# Patient Record
Sex: MALE | Race: WHITE | Employment: OTHER | URBAN - METROPOLITAN AREA
[De-identification: names, ages, dates, MRNs, and addresses within clinical notes are randomized per-mention and may not be internally consistent; named-entity substitution may affect disease eponyms.]

---

## 2021-03-05 ENCOUNTER — HOSPITAL ENCOUNTER (INPATIENT)
Age: 71
LOS: 1 days | Discharge: ANOTHER HOSPITAL | DRG: 287 | End: 2021-03-05
Attending: SPECIALIST | Admitting: SPECIALIST
Payer: MEDICARE

## 2021-03-05 ENCOUNTER — APPOINTMENT (OUTPATIENT)
Dept: CT IMAGING | Age: 71
DRG: 287 | End: 2021-03-05
Attending: SPECIALIST
Payer: MEDICARE

## 2021-03-05 ENCOUNTER — APPOINTMENT (OUTPATIENT)
Dept: VASCULAR SURGERY | Age: 71
DRG: 287 | End: 2021-03-05
Attending: INTERNAL MEDICINE
Payer: MEDICARE

## 2021-03-05 ENCOUNTER — APPOINTMENT (OUTPATIENT)
Dept: GENERAL RADIOLOGY | Age: 71
DRG: 302 | End: 2021-03-05
Attending: NURSE PRACTITIONER
Payer: MEDICARE

## 2021-03-05 ENCOUNTER — HOSPITAL ENCOUNTER (INPATIENT)
Age: 71
LOS: 3 days | Discharge: HOME | DRG: 302 | End: 2021-03-08
Attending: THORACIC SURGERY (CARDIOTHORACIC VASCULAR SURGERY) | Admitting: THORACIC SURGERY (CARDIOTHORACIC VASCULAR SURGERY)
Payer: MEDICARE

## 2021-03-05 VITALS
SYSTOLIC BLOOD PRESSURE: 141 MMHG | HEART RATE: 51 BPM | BODY MASS INDEX: 25.65 KG/M2 | HEIGHT: 64 IN | OXYGEN SATURATION: 96 % | DIASTOLIC BLOOD PRESSURE: 78 MMHG | TEMPERATURE: 97.7 F | WEIGHT: 150.25 LBS | RESPIRATION RATE: 17 BRPM

## 2021-03-05 DIAGNOSIS — R07.9 CHEST PAIN, UNSPECIFIED TYPE: ICD-10-CM

## 2021-03-05 DIAGNOSIS — I63.9 ACUTE CVA (CEREBROVASCULAR ACCIDENT) (HCC): ICD-10-CM

## 2021-03-05 PROBLEM — I20.0 UNSTABLE ANGINA (HCC): Status: ACTIVE | Noted: 2021-03-05

## 2021-03-05 PROBLEM — I25.10 CAD (CORONARY ARTERY DISEASE): Status: ACTIVE | Noted: 2021-03-05

## 2021-03-05 LAB
ABO + RH BLD: NORMAL
ALBUMIN SERPL-MCNC: 3.8 G/DL (ref 3.5–5)
ALBUMIN/GLOB SERPL: 1.4 {RATIO} (ref 1.1–2.2)
ALP SERPL-CCNC: 52 U/L (ref 45–117)
ALT SERPL-CCNC: 31 U/L (ref 12–78)
AMORPH CRY URNS QL MICRO: ABNORMAL
ANION GAP SERPL CALC-SCNC: 5 MMOL/L (ref 5–15)
ANION GAP SERPL CALC-SCNC: 5 MMOL/L (ref 5–15)
APPEARANCE UR: ABNORMAL
APTT PPP: 24.8 SEC (ref 22.1–31)
ARTERIAL PATENCY WRIST A: YES
AST SERPL-CCNC: 17 U/L (ref 15–37)
ATRIAL RATE: 46 BPM
BACTERIA URNS QL MICRO: NEGATIVE /HPF
BASE DEFICIT BLD-SCNC: 1 MMOL/L
BASOPHILS # BLD: 0 K/UL (ref 0–0.1)
BASOPHILS NFR BLD: 0 % (ref 0–1)
BDY SITE: ABNORMAL
BILIRUB SERPL-MCNC: 0.7 MG/DL (ref 0.2–1)
BILIRUB UR QL: NEGATIVE
BLOOD GROUP ANTIBODIES SERPL: NORMAL
BNP SERPL-MCNC: 32 PG/ML
BUN SERPL-MCNC: 21 MG/DL (ref 6–20)
BUN SERPL-MCNC: 22 MG/DL (ref 6–20)
BUN/CREAT SERPL: 16 (ref 12–20)
BUN/CREAT SERPL: 17 (ref 12–20)
CA-I BLD-SCNC: 1.3 MMOL/L (ref 1.12–1.32)
CALCIUM SERPL-MCNC: 9 MG/DL (ref 8.5–10.1)
CALCIUM SERPL-MCNC: 9.2 MG/DL (ref 8.5–10.1)
CALCULATED P AXIS, ECG09: 58 DEGREES
CALCULATED R AXIS, ECG10: 19 DEGREES
CALCULATED T AXIS, ECG11: 43 DEGREES
CHLORIDE SERPL-SCNC: 109 MMOL/L (ref 97–108)
CHLORIDE SERPL-SCNC: 109 MMOL/L (ref 97–108)
CHOLEST SERPL-MCNC: 208 MG/DL
CO2 SERPL-SCNC: 25 MMOL/L (ref 21–32)
CO2 SERPL-SCNC: 28 MMOL/L (ref 21–32)
COLOR UR: ABNORMAL
CREAT SERPL-MCNC: 1.27 MG/DL (ref 0.7–1.3)
CREAT SERPL-MCNC: 1.28 MG/DL (ref 0.7–1.3)
DIAGNOSIS, 93000: NORMAL
DIFFERENTIAL METHOD BLD: ABNORMAL
EOSINOPHIL # BLD: 0.2 K/UL (ref 0–0.4)
EOSINOPHIL NFR BLD: 4 % (ref 0–7)
EPITH CASTS URNS QL MICRO: ABNORMAL /LPF
ERYTHROCYTE [DISTWIDTH] IN BLOOD BY AUTOMATED COUNT: 15.5 % (ref 11.5–14.5)
ERYTHROCYTE [DISTWIDTH] IN BLOOD BY AUTOMATED COUNT: 16.9 % (ref 11.5–14.5)
EST. AVERAGE GLUCOSE BLD GHB EST-MCNC: 105 MG/DL
GAS FLOW.O2 O2 DELIVERY SYS: ABNORMAL L/MIN
GLOBULIN SER CALC-MCNC: 2.7 G/DL (ref 2–4)
GLUCOSE BLD STRIP.AUTO-MCNC: 124 MG/DL (ref 65–100)
GLUCOSE SERPL-MCNC: 104 MG/DL (ref 65–100)
GLUCOSE SERPL-MCNC: 109 MG/DL (ref 65–100)
GLUCOSE UR STRIP.AUTO-MCNC: NEGATIVE MG/DL
HAV IGM SER QL: NONREACTIVE
HBA1C MFR BLD: 5.3 % (ref 4–5.6)
HBV CORE IGM SER QL: NONREACTIVE
HBV SURFACE AG SER QL: <0.1 INDEX
HBV SURFACE AG SER QL: NEGATIVE
HCO3 BLD-SCNC: 23.9 MMOL/L (ref 22–26)
HCT VFR BLD AUTO: 38 % (ref 36.6–50.3)
HCT VFR BLD AUTO: 41.3 % (ref 36.6–50.3)
HCV AB SERPL QL IA: NONREACTIVE
HCV COMMENT,HCGAC: NORMAL
HDLC SERPL-MCNC: 40 MG/DL
HDLC SERPL: 5.2 {RATIO} (ref 0–5)
HGB BLD-MCNC: 11.9 G/DL (ref 12.1–17)
HGB BLD-MCNC: 12.7 G/DL (ref 12.1–17)
HGB UR QL STRIP: NEGATIVE
IMM GRANULOCYTES # BLD AUTO: 0 K/UL (ref 0–0.04)
IMM GRANULOCYTES NFR BLD AUTO: 0 % (ref 0–0.5)
INR PPP: 1.1 (ref 0.9–1.1)
KETONES UR QL STRIP.AUTO: NEGATIVE MG/DL
LDLC SERPL CALC-MCNC: 131 MG/DL (ref 0–100)
LEUKOCYTE ESTERASE UR QL STRIP.AUTO: NEGATIVE
LIPID PROFILE,FLP: ABNORMAL
LYMPHOCYTES # BLD: 1.4 K/UL (ref 0.8–3.5)
LYMPHOCYTES NFR BLD: 27 % (ref 12–49)
MAGNESIUM SERPL-MCNC: 2.9 MG/DL (ref 1.6–2.4)
MCH RBC QN AUTO: 20.8 PG (ref 26–34)
MCH RBC QN AUTO: 21.2 PG (ref 26–34)
MCHC RBC AUTO-ENTMCNC: 30.8 G/DL (ref 30–36.5)
MCHC RBC AUTO-ENTMCNC: 31.3 G/DL (ref 30–36.5)
MCV RBC AUTO: 67.7 FL (ref 80–99)
MCV RBC AUTO: 67.7 FL (ref 80–99)
MONOCYTES # BLD: 0.5 K/UL (ref 0–1)
MONOCYTES NFR BLD: 10 % (ref 5–13)
NEUTS SEG # BLD: 3 K/UL (ref 1.8–8)
NEUTS SEG NFR BLD: 59 % (ref 32–75)
NITRITE UR QL STRIP.AUTO: NEGATIVE
NRBC # BLD: 0 K/UL (ref 0–0.01)
NRBC # BLD: 0 K/UL (ref 0–0.01)
NRBC BLD-RTO: 0 PER 100 WBC
NRBC BLD-RTO: 0 PER 100 WBC
P-R INTERVAL, ECG05: 170 MS
PCO2 BLD: 38.8 MMHG (ref 35–45)
PH BLD: 7.4 [PH] (ref 7.35–7.45)
PH UR STRIP: 7.5 [PH] (ref 5–8)
PLATELET # BLD AUTO: 221 K/UL (ref 150–400)
PLATELET # BLD AUTO: 229 K/UL (ref 150–400)
PMV BLD AUTO: 10 FL (ref 8.9–12.9)
PMV BLD AUTO: 10.7 FL (ref 8.9–12.9)
PO2 BLD: 73 MMHG (ref 80–100)
POTASSIUM SERPL-SCNC: 4.1 MMOL/L (ref 3.5–5.1)
POTASSIUM SERPL-SCNC: 4.3 MMOL/L (ref 3.5–5.1)
PROT SERPL-MCNC: 6.5 G/DL (ref 6.4–8.2)
PROT UR STRIP-MCNC: NEGATIVE MG/DL
PROTHROMBIN TIME: 11.1 SEC (ref 9–11.1)
Q-T INTERVAL, ECG07: 434 MS
QRS DURATION, ECG06: 90 MS
QTC CALCULATION (BEZET), ECG08: 379 MS
RBC # BLD AUTO: 5.61 M/UL (ref 4.1–5.7)
RBC # BLD AUTO: 6.1 M/UL (ref 4.1–5.7)
RBC #/AREA URNS HPF: ABNORMAL /HPF (ref 0–5)
RBC MORPH BLD: ABNORMAL
SAO2 % BLD: 94 % (ref 92–97)
SERVICE CMNT-IMP: ABNORMAL
SODIUM SERPL-SCNC: 139 MMOL/L (ref 136–145)
SODIUM SERPL-SCNC: 142 MMOL/L (ref 136–145)
SP GR UR REFRACTOMETRY: 1.01 (ref 1–1.03)
SP1: NORMAL
SP2: NORMAL
SP3: NORMAL
SPECIMEN EXP DATE BLD: NORMAL
SPECIMEN TYPE: ABNORMAL
THERAPEUTIC RANGE,PTTT: NORMAL SECS (ref 58–77)
TOTAL RESP. RATE, ITRR: 20
TRIGL SERPL-MCNC: 185 MG/DL (ref ?–150)
TSH SERPL DL<=0.05 MIU/L-ACNC: 1.76 UIU/ML (ref 0.36–3.74)
UA: UC IF INDICATED,UAUC: ABNORMAL
UROBILINOGEN UR QL STRIP.AUTO: 0.2 EU/DL (ref 0.2–1)
VENTRICULAR RATE, ECG03: 46 BPM
VLDLC SERPL CALC-MCNC: 37 MG/DL
WBC # BLD AUTO: 4.3 K/UL (ref 4.1–11.1)
WBC # BLD AUTO: 5.1 K/UL (ref 4.1–11.1)
WBC URNS QL MICRO: ABNORMAL /HPF (ref 0–4)

## 2021-03-05 PROCEDURE — 74011000250 HC RX REV CODE- 250: Performed by: SPECIALIST

## 2021-03-05 PROCEDURE — C1894 INTRO/SHEATH, NON-LASER: HCPCS | Performed by: SPECIALIST

## 2021-03-05 PROCEDURE — 83735 ASSAY OF MAGNESIUM: CPT

## 2021-03-05 PROCEDURE — 36415 COLL VENOUS BLD VENIPUNCTURE: CPT

## 2021-03-05 PROCEDURE — 86901 BLOOD TYPING SEROLOGIC RH(D): CPT

## 2021-03-05 PROCEDURE — 82962 GLUCOSE BLOOD TEST: CPT

## 2021-03-05 PROCEDURE — 80074 ACUTE HEPATITIS PANEL: CPT

## 2021-03-05 PROCEDURE — 82803 BLOOD GASES ANY COMBINATION: CPT

## 2021-03-05 PROCEDURE — 80061 LIPID PANEL: CPT

## 2021-03-05 PROCEDURE — 65620000000 HC RM CCU GENERAL

## 2021-03-05 PROCEDURE — 83036 HEMOGLOBIN GLYCOSYLATED A1C: CPT

## 2021-03-05 PROCEDURE — 4A023N7 MEASUREMENT OF CARDIAC SAMPLING AND PRESSURE, LEFT HEART, PERCUTANEOUS APPROACH: ICD-10-PCS | Performed by: SPECIALIST

## 2021-03-05 PROCEDURE — 74011250636 HC RX REV CODE- 250/636: Performed by: NURSE PRACTITIONER

## 2021-03-05 PROCEDURE — 36600 WITHDRAWAL OF ARTERIAL BLOOD: CPT

## 2021-03-05 PROCEDURE — 70450 CT HEAD/BRAIN W/O DYE: CPT

## 2021-03-05 PROCEDURE — 74011250637 HC RX REV CODE- 250/637: Performed by: NURSE PRACTITIONER

## 2021-03-05 PROCEDURE — 77030029065 HC DRSG HEMO QCLOT ZMED -B

## 2021-03-05 PROCEDURE — 85730 THROMBOPLASTIN TIME PARTIAL: CPT

## 2021-03-05 PROCEDURE — 65270000029 HC RM PRIVATE

## 2021-03-05 PROCEDURE — 93458 L HRT ARTERY/VENTRICLE ANGIO: CPT | Performed by: SPECIALIST

## 2021-03-05 PROCEDURE — 99153 MOD SED SAME PHYS/QHP EA: CPT | Performed by: SPECIALIST

## 2021-03-05 PROCEDURE — 93005 ELECTROCARDIOGRAM TRACING: CPT

## 2021-03-05 PROCEDURE — 93880 EXTRACRANIAL BILAT STUDY: CPT

## 2021-03-05 PROCEDURE — 99218 HC RM OBSERVATION: CPT

## 2021-03-05 PROCEDURE — 71045 X-RAY EXAM CHEST 1 VIEW: CPT

## 2021-03-05 PROCEDURE — B2111ZZ FLUOROSCOPY OF MULTIPLE CORONARY ARTERIES USING LOW OSMOLAR CONTRAST: ICD-10-PCS | Performed by: SPECIALIST

## 2021-03-05 PROCEDURE — 99152 MOD SED SAME PHYS/QHP 5/>YRS: CPT | Performed by: SPECIALIST

## 2021-03-05 PROCEDURE — 77030004532 HC CATH ANGI DX IMP BSC -A: Performed by: SPECIALIST

## 2021-03-05 PROCEDURE — 85025 COMPLETE CBC W/AUTO DIFF WBC: CPT

## 2021-03-05 PROCEDURE — 80053 COMPREHEN METABOLIC PANEL: CPT

## 2021-03-05 PROCEDURE — 80048 BASIC METABOLIC PNL TOTAL CA: CPT

## 2021-03-05 PROCEDURE — 85610 PROTHROMBIN TIME: CPT

## 2021-03-05 PROCEDURE — 77030003390 HC NDL ANGI MRTM -A: Performed by: SPECIALIST

## 2021-03-05 PROCEDURE — 85027 COMPLETE CBC AUTOMATED: CPT

## 2021-03-05 PROCEDURE — 74011250636 HC RX REV CODE- 250/636: Performed by: SPECIALIST

## 2021-03-05 PROCEDURE — 83880 ASSAY OF NATRIURETIC PEPTIDE: CPT

## 2021-03-05 PROCEDURE — 84443 ASSAY THYROID STIM HORMONE: CPT

## 2021-03-05 PROCEDURE — 74011000636 HC RX REV CODE- 636: Performed by: SPECIALIST

## 2021-03-05 PROCEDURE — 81001 URINALYSIS AUTO W/SCOPE: CPT

## 2021-03-05 RX ORDER — AMIODARONE HYDROCHLORIDE 200 MG/1
400 TABLET ORAL EVERY 12 HOURS
Status: DISCONTINUED | OUTPATIENT
Start: 2021-03-05 | End: 2021-03-05

## 2021-03-05 RX ORDER — AMIODARONE HYDROCHLORIDE 200 MG/1
400 TABLET ORAL EVERY 12 HOURS
Status: CANCELLED | OUTPATIENT
Start: 2021-03-05

## 2021-03-05 RX ORDER — ASPIRIN 81 MG/1
81 TABLET ORAL DAILY
COMMUNITY

## 2021-03-05 RX ORDER — ENOXAPARIN SODIUM 100 MG/ML
1 INJECTION SUBCUTANEOUS EVERY 12 HOURS
Status: COMPLETED | OUTPATIENT
Start: 2021-03-05 | End: 2021-03-08

## 2021-03-05 RX ORDER — SODIUM CHLORIDE 0.9 % (FLUSH) 0.9 %
5-40 SYRINGE (ML) INJECTION EVERY 8 HOURS
Status: DISCONTINUED | OUTPATIENT
Start: 2021-03-05 | End: 2021-03-08 | Stop reason: HOSPADM

## 2021-03-05 RX ORDER — ADHESIVE BANDAGE
30 BANDAGE TOPICAL DAILY PRN
Status: DISCONTINUED | OUTPATIENT
Start: 2021-03-05 | End: 2021-03-05 | Stop reason: HOSPADM

## 2021-03-05 RX ORDER — SODIUM CHLORIDE 0.9 % (FLUSH) 0.9 %
5-40 SYRINGE (ML) INJECTION EVERY 8 HOURS
Status: CANCELLED | OUTPATIENT
Start: 2021-03-05

## 2021-03-05 RX ORDER — DIPHENHYDRAMINE HYDROCHLORIDE 50 MG/ML
25 INJECTION, SOLUTION INTRAMUSCULAR; INTRAVENOUS
Status: DISCONTINUED | OUTPATIENT
Start: 2021-03-05 | End: 2021-03-05 | Stop reason: HOSPADM

## 2021-03-05 RX ORDER — ATORVASTATIN CALCIUM 10 MG/1
10 TABLET, FILM COATED ORAL DAILY
Status: CANCELLED | OUTPATIENT
Start: 2021-03-06

## 2021-03-05 RX ORDER — ATORVASTATIN CALCIUM 10 MG/1
10 TABLET, FILM COATED ORAL DAILY
COMMUNITY
End: 2021-03-08

## 2021-03-05 RX ORDER — HYDROMORPHONE HYDROCHLORIDE 1 MG/ML
1 INJECTION, SOLUTION INTRAMUSCULAR; INTRAVENOUS; SUBCUTANEOUS
Status: DISCONTINUED | OUTPATIENT
Start: 2021-03-05 | End: 2021-03-05 | Stop reason: HOSPADM

## 2021-03-05 RX ORDER — SODIUM CHLORIDE 9 MG/ML
125 INJECTION, SOLUTION INTRAVENOUS CONTINUOUS
Status: DISPENSED | OUTPATIENT
Start: 2021-03-05 | End: 2021-03-05

## 2021-03-05 RX ORDER — HYDROCORTISONE SODIUM SUCCINATE 100 MG/2ML
100 INJECTION, POWDER, FOR SOLUTION INTRAMUSCULAR; INTRAVENOUS
Status: DISCONTINUED | OUTPATIENT
Start: 2021-03-05 | End: 2021-03-05 | Stop reason: HOSPADM

## 2021-03-05 RX ORDER — ASPIRIN 81 MG/1
81 TABLET ORAL DAILY
Status: DISCONTINUED | OUTPATIENT
Start: 2021-03-05 | End: 2021-03-05 | Stop reason: HOSPADM

## 2021-03-05 RX ORDER — SODIUM CHLORIDE 0.9 % (FLUSH) 0.9 %
5-40 SYRINGE (ML) INJECTION AS NEEDED
Status: DISCONTINUED | OUTPATIENT
Start: 2021-03-05 | End: 2021-03-05 | Stop reason: HOSPADM

## 2021-03-05 RX ORDER — METOPROLOL TARTRATE 25 MG/1
25 TABLET, FILM COATED ORAL 2 TIMES DAILY
Status: ON HOLD | COMMUNITY
End: 2021-04-30 | Stop reason: SDUPTHER

## 2021-03-05 RX ORDER — METOPROLOL TARTRATE 25 MG/1
25 TABLET, FILM COATED ORAL 2 TIMES DAILY
Status: DISCONTINUED | OUTPATIENT
Start: 2021-03-05 | End: 2021-03-05 | Stop reason: HOSPADM

## 2021-03-05 RX ORDER — ATORVASTATIN CALCIUM 10 MG/1
10 TABLET, FILM COATED ORAL DAILY
Status: DISCONTINUED | OUTPATIENT
Start: 2021-03-05 | End: 2021-03-05 | Stop reason: HOSPADM

## 2021-03-05 RX ORDER — SODIUM CHLORIDE 0.9 % (FLUSH) 0.9 %
5-40 SYRINGE (ML) INJECTION AS NEEDED
Status: DISCONTINUED | OUTPATIENT
Start: 2021-03-05 | End: 2021-03-08 | Stop reason: HOSPADM

## 2021-03-05 RX ORDER — TADALAFIL 10 MG/1
10 TABLET ORAL
COMMUNITY
End: 2021-03-08

## 2021-03-05 RX ORDER — SODIUM CHLORIDE 0.9 % (FLUSH) 0.9 %
5-40 SYRINGE (ML) INJECTION AS NEEDED
Status: CANCELLED | OUTPATIENT
Start: 2021-03-05

## 2021-03-05 RX ORDER — MIDAZOLAM HYDROCHLORIDE 1 MG/ML
INJECTION, SOLUTION INTRAMUSCULAR; INTRAVENOUS AS NEEDED
Status: DISCONTINUED | OUTPATIENT
Start: 2021-03-05 | End: 2021-03-05 | Stop reason: HOSPADM

## 2021-03-05 RX ORDER — ZOLPIDEM TARTRATE 5 MG/1
5 TABLET ORAL
Status: DISCONTINUED | OUTPATIENT
Start: 2021-03-05 | End: 2021-03-05 | Stop reason: HOSPADM

## 2021-03-05 RX ORDER — LIDOCAINE HYDROCHLORIDE 10 MG/ML
INJECTION INFILTRATION; PERINEURAL AS NEEDED
Status: DISCONTINUED | OUTPATIENT
Start: 2021-03-05 | End: 2021-03-05 | Stop reason: HOSPADM

## 2021-03-05 RX ORDER — METOPROLOL TARTRATE 25 MG/1
25 TABLET, FILM COATED ORAL 2 TIMES DAILY
Status: CANCELLED | OUTPATIENT
Start: 2021-03-05

## 2021-03-05 RX ORDER — ACETAMINOPHEN 325 MG/1
650 TABLET ORAL
Status: CANCELLED | OUTPATIENT
Start: 2021-03-05

## 2021-03-05 RX ORDER — HEPARIN SODIUM 200 [USP'U]/100ML
INJECTION, SOLUTION INTRAVENOUS
Status: COMPLETED | OUTPATIENT
Start: 2021-03-05 | End: 2021-03-05

## 2021-03-05 RX ORDER — ACETAMINOPHEN 325 MG/1
650 TABLET ORAL
Status: DISCONTINUED | OUTPATIENT
Start: 2021-03-05 | End: 2021-03-08 | Stop reason: HOSPADM

## 2021-03-05 RX ORDER — ONDANSETRON 2 MG/ML
4 INJECTION INTRAMUSCULAR; INTRAVENOUS
Status: CANCELLED | OUTPATIENT
Start: 2021-03-05

## 2021-03-05 RX ORDER — METOPROLOL TARTRATE 25 MG/1
25 TABLET, FILM COATED ORAL 2 TIMES DAILY
Status: DISCONTINUED | OUTPATIENT
Start: 2021-03-05 | End: 2021-03-08 | Stop reason: HOSPADM

## 2021-03-05 RX ORDER — ONDANSETRON 2 MG/ML
4 INJECTION INTRAMUSCULAR; INTRAVENOUS
Status: DISCONTINUED | OUTPATIENT
Start: 2021-03-05 | End: 2021-03-08 | Stop reason: HOSPADM

## 2021-03-05 RX ORDER — ASPIRIN 81 MG/1
81 TABLET ORAL DAILY
Status: CANCELLED | OUTPATIENT
Start: 2021-03-06

## 2021-03-05 RX ORDER — ASPIRIN 81 MG/1
81 TABLET ORAL DAILY
Status: DISCONTINUED | OUTPATIENT
Start: 2021-03-06 | End: 2021-03-08 | Stop reason: HOSPADM

## 2021-03-05 RX ORDER — SODIUM CHLORIDE 0.9 % (FLUSH) 0.9 %
5-40 SYRINGE (ML) INJECTION EVERY 8 HOURS
Status: DISCONTINUED | OUTPATIENT
Start: 2021-03-05 | End: 2021-03-05 | Stop reason: HOSPADM

## 2021-03-05 RX ORDER — ACETAMINOPHEN 325 MG/1
650 TABLET ORAL
Status: DISCONTINUED | OUTPATIENT
Start: 2021-03-05 | End: 2021-03-05 | Stop reason: HOSPADM

## 2021-03-05 RX ORDER — ONDANSETRON 2 MG/ML
4 INJECTION INTRAMUSCULAR; INTRAVENOUS
Status: DISCONTINUED | OUTPATIENT
Start: 2021-03-05 | End: 2021-03-05 | Stop reason: HOSPADM

## 2021-03-05 RX ORDER — ATORVASTATIN CALCIUM 10 MG/1
10 TABLET, FILM COATED ORAL DAILY
Status: DISCONTINUED | OUTPATIENT
Start: 2021-03-06 | End: 2021-03-07

## 2021-03-05 RX ORDER — FENTANYL CITRATE 50 UG/ML
INJECTION, SOLUTION INTRAMUSCULAR; INTRAVENOUS AS NEEDED
Status: DISCONTINUED | OUTPATIENT
Start: 2021-03-05 | End: 2021-03-05 | Stop reason: HOSPADM

## 2021-03-05 RX ORDER — SODIUM CHLORIDE 9 MG/ML
75 INJECTION, SOLUTION INTRAVENOUS CONTINUOUS
Status: DISCONTINUED | OUTPATIENT
Start: 2021-03-05 | End: 2021-03-05

## 2021-03-05 RX ORDER — TADALAFIL 5 MG/1
5 TABLET ORAL
COMMUNITY
End: 2021-03-08

## 2021-03-05 RX ORDER — HYDRALAZINE HYDROCHLORIDE 20 MG/ML
10 INJECTION INTRAMUSCULAR; INTRAVENOUS
Status: DISCONTINUED | OUTPATIENT
Start: 2021-03-05 | End: 2021-03-08 | Stop reason: HOSPADM

## 2021-03-05 RX ADMIN — NITROGLYCERIN 1 INCH: 20 OINTMENT TOPICAL at 17:47

## 2021-03-05 RX ADMIN — ENOXAPARIN SODIUM 70 MG: 80 INJECTION SUBCUTANEOUS at 17:47

## 2021-03-05 RX ADMIN — SODIUM CHLORIDE 75 ML/HR: 9 INJECTION, SOLUTION INTRAVENOUS at 07:35

## 2021-03-05 RX ADMIN — METOPROLOL TARTRATE 25 MG: 25 TABLET, FILM COATED ORAL at 17:47

## 2021-03-05 RX ADMIN — Medication 10 ML: at 21:33

## 2021-03-05 NOTE — Clinical Note
Sheath #1: sheath exchanged for [facility] XCUT DESTINATION [id number] -- . Hemostasis achieved.

## 2021-03-05 NOTE — PROGRESS NOTES
9:00 AM    Blood aspirated from sheath then arterial sheath pulled 6 Fr R Groin. Telly Us applied. Manual pressure held by Ed RN.     9:15 AM    Hemostasis achieved at 0915. Dressing applied. Pt voices understanding of post procedure bedrest instructions. 11:12 AM    While doing groin and pulse check, patient states he is having visual disturbances. Describes as gray spots with occasional flashing lights on the lateral side or both eyes. 11:14 AM     Code stroke activated     11:15    VS signs taken /63, HR 57 Sinus jules. RR 16, Ox 100% on RA.   NO complaints of pain. 11:17    ICU nurse [facility] RN, Nursing supervisor Judy Givens and  at bedside     11:20  Dr Marc Esquivel and Dr Ricardo Desai at bedside to assess patient     11:23    Patient transported to [facility] with this RN and ICU RN [facility]. 11:25     CT Scan completed     11:30    Neuro Tele Health called     11:43    Neuro Tele health MD Dr Nito Moody on telehelath call assessing patient     11:47    Tele health call complete with no further orders. 11:52     Patient back to [facility] in Cath Recovery. Wife at bedside. Dr Ricardo Desai at bedside updated. 1025     Patient picked up by [facility] for transport to [facility].

## 2021-03-05 NOTE — CONSULTS
[address]  [phone]    Hospitalist Consult Note      NAME:  Luzma Dueñas   :   1950   MRN:  [id number]     Requesting Physician Luis Miguel Hanan MD   Reason for Consult:  Visual disturbance     PCP:  DMITRY uG     Date/Time of service  3/5/2021 12:07 PM       Assessment and Plan:      CAD (coronary artery disease) / Unstable angina - Admitted by cardiology and taken straight to cath. Significant 3V disease found without PCI, helen is to follow up for CABG. CArdiology started metoprolol atorvastatin and ASA    Visual disturbance - Just after cath, patient reported visual disturbance. He states that he sees a dark \"snake\" across his visual fields both left and right, and that he sees it in both eyes. Not painful. No other neurological symptoms. Code stoke called. I spoke with teleneurologist. I texted to Dr Isamar Chong or neurology. CT head unremarkable. We will start TIA workup. Neuro consult and neurochecks. MRI/MRA brain and neck. Check Lipids, TSH and A1c. Start lovenox. Check drug screen. Fall precautions and PT/OT eval.          Subjective:     CHIEF COMPLAINT: chest pain     HISTORY OF PRESENT ILLNESS:     Mr. Kelsi Velazquez is a 69 y.o.  male who is admitted to the cardiology Service with CP. We are asked to evaluate for visual disturbance. Just after cath, patient reported visual disturbance. He states that he sees a dark \"snake\" across his visula fields both left and right, and that he sees it in both eyes. Not painful. No other neurological symptoms. No past medical history on file. No past surgical history on file. Social History     Tobacco Use    Smoking status: Not on file   Substance Use Topics    Alcohol use: Not on file        No family history on file. No Known Allergies     Prior to Admission medications    Medication Sig Start Date End Date Taking?  Authorizing Provider atorvastatin (LIPITOR) 10 mg tablet Take 10 mg by mouth daily. Yes Provider, Historical   aspirin delayed-release 81 mg tablet Take 81 mg by mouth daily. Yes Provider, Historical   metoprolol tartrate (LOPRESSOR) 25 mg tablet Take 25 mg by mouth two (2) times a day. Yes Provider, Historical   tadalafiL (Cialis) 5 mg tablet Take 5 mg by mouth. Yes Provider, Historical   tadalafiL (Cialis) 10 mg tablet Take 10 mg by mouth.    Yes Provider, Historical         Current Facility-Administered Medications:     0.9% sodium chloride infusion, 125 mL/hr, IntraVENous, CONTINUOUS, Kalyan Gallo MD    sodium chloride (NS) flush 5-40 mL, 5-40 mL, IntraVENous, Q8H, Kalyan Gallo MD    sodium chloride (NS) flush 5-40 mL, 5-40 mL, IntraVENous, PRN, Kalyan Gallo MD    diphenhydrAMINE (BENADRYL) injection 25 mg, 25 mg, IntraVENous, ONCE PRN, Kalyan Gallo MD    hydrocortisone Sod Succ (PF) (SOLU-CORTEF) injection 100 mg, 100 mg, IntraVENous, ONCE PRN, Kalyan Gallo MD    aspirin delayed-release tablet 81 mg, 81 mg, Oral, DAILY, Kalyan Gallo MD    atorvastatin (LIPITOR) tablet 10 mg, 10 mg, Oral, DAILY, Kalyan Gallo MD    metoprolol tartrate (LOPRESSOR) tablet 25 mg, 25 mg, Oral, BID, Kalyan Gallo MD    sodium chloride (NS) flush 5-40 mL, 5-40 mL, IntraVENous, Q8H, Kalyan Gallo MD    sodium chloride (NS) flush 5-40 mL, 5-40 mL, IntraVENous, PRN, Kalyan Gallo MD    acetaminophen (TYLENOL) tablet 650 mg, 650 mg, Oral, Q4H PRN, Kalyan Gallo MD    HYDROmorphone (DILAUDID) syringe 1 mg, 1 mg, IntraVENous, Q4H PRN, Kalyan Gallo MD    zolpidem (AMBIEN) tablet 5 mg, 5 mg, Oral, QHS PRN, Kalyan Gallo MD    ondansetron [facility]) injection 4 mg, 4 mg, IntraVENous, Q4H PRN, Kalyan Gallo MD    magnesium hydroxide (MILK OF MAGNESIA) 400 mg/5 mL oral suspension 30 mL, 30 mL, Oral, DAILY PRN, Kalyan Gallo MD    Review of Systems:  (bold if positive, if negative)    Gen:  Eyes:  Visual changes, ENT:  CVS: Pulm:  GI:  :  MS:  Skin:  Psych:  Endo:  Hem:  Renal:  Neuro:            Objective:      VITALS:    Vital signs reviewed; most recent are:    Visit Vitals  BP (!) 144/63   Pulse (!) 50   Temp 97.7 °F (36.5 °C)   Resp 14   Ht 5' 4\" (1.626 m)   Wt 68.2 kg (150 lb 4 oz)   SpO2 98%   BMI 25.79 kg/m²     SpO2 Readings from Last 6 Encounters:   03/05/21 98%    O2 Flow Rate (L/min): 2 l/min   No intake or output data in the 24 hours ending 03/05/21 1207   All Micro Results     Procedure Component Value Units Date/Time    CULTURE, URINE [[id number]]     Order Status: Sent Specimen: Urine from Clean catch             Exam:     Physical Exam:    Gen:  Well-developed, well-nourished, in no acute distress  HEENT:  Pink conjunctivae, PERRL, hearing intact to voice, moist mucous membranes  Neck:  Supple, without masses, thyroid non-tender  Resp:  No accessory muscle use, clear breath sounds without wheezes rales or rhonchi  Card:  No murmurs, bradycardic S1, S2 without thrills, bruits or peripheral edema  Abd:  Soft, non-tender, non-distended, normoactive bowel sounds are present, no mass  Lymph:  No cervical or inguinal adenopathy  Musc:  No cyanosis or clubbing  Skin:  No rashes or ulcers, skin turgor is good  Neuro:  Cranial nerves are grossly intact, no focal motor weakness, follows commands appropriately  Psych:  Good insight, oriented to person, place and time, alert       Labs:    Recent Labs     03/05/21  0715   WBC 4.3   HGB 12.7   HCT 41.3        Recent Labs     03/05/21  0715      K 4.3   *   CO2 25   *   BUN 22*   CREA 1.27   CA 9.0   MG 2.9*     Lab Results   Component Value Date/Time    Glucose (POC) 124 (H) 03/05/2021 11:17 AM     No results for input(s): PH, PCO2, PO2, HCO3, FIO2 in the last 72 hours. No results for input(s): INR, INREXT in the last 72 hours.     I have reviewed previous records,    Telemetry reviewed:   normal sinus rhythm    Risk of deterioration: high      Total time spent with patient: 35 minutes I personally reviewed chart, notes, data and current medications in the medical record. I have personally examined and treated the patient at bedside during this period.                  Care Plan discussed with: Patient, Nursing Staff, Consultant/Specialist and >50% of time spent in counseling and coordination of care    Discussed:  Care Plan       ___________________________________________________    Attending Physician: Tavo Morris MD

## 2021-03-05 NOTE — PROGRESS NOTES
TRANSFER - IN REPORT:    Verbal report received from Hattie Roberto RN (name) on Acquanetta Mater  being received from  Cath lab(unit) for routine progression of care      Report consisted of patients Situation, Background, Assessment and   Recommendations(SBAR). Information from the following report(s) SBAR was reviewed with the receiving nurse. Opportunity for questions and clarification was provided. Assessment completed upon patients arrival to unit and care assumed.

## 2021-03-05 NOTE — PROCEDURES
Cath:  Obstructive 3VD:     LAD p80, m50; D1 ost95     LCx m90 (after OM2); OM1 ost70, OM2 40     RCA m50, d80  Mild LV systolic dysfunction     EF 50%     Mild apical HK.  No AVG/MR    EXTREMELY tortuous right femoral artery.  RFA manual    F/U with Dr. Dave 3/11/21 @ 1:40pm to discuss CABG.

## 2021-03-05 NOTE — H&P
CSS   History and Physical    Subjective:      Vel Parrish is a 69 y.o. male who was transferred from [facility] today for CAD by Dr. Misha Arroyo. The patient has a PMH significant for newly diagnosed HTN, HLD, previous smoker (18 packyear-quit 2002), kidney stones, chronic lower back pain. He reports a 2-3 week history of chest tightness with activity. He is active and noticed the pain when riding his bicycle, playing pickleball, and walking [facility] the hill at [facility]. He describes this as a tightness located substernal. Pain improves with rest and is variable in duration. His pain is associated with shortness of breath. He denies associated nausea, vomiting, syncope, diaphoresis, syncope, or radiation of pain. He is retired from [employer]. He lives in a home in [place] with his wife who is able to help him following surgery. He is a previous smoker and quit in 2002. Cardiac Testing    Cardiac catheterization:03/05/21   Conclusion       Cath:  Obstructive 3VD:     LAD p80, m50; D1 ost95     LCx m90 (after OM2); OM1 ost70, OM2 40     RCA m50, V41  Mild LV systolic dysfunction     EF 50%     Mild apical HK. No AVG/MR     EXTREMELY tortuous right femoral artery. RFA manual     F/U with Dr. Sandor Bazan 3/11/21 @ 1:40pm to discuss CABG. Coronary Findings    Diagnostic  Dominance: Right  Left Main   The vessel was visualized by angiography. The vessel is angiographically normal.   Left Anterior Descending   Prox LAD lesion, 80% stenosed. Mid LAD lesion, 50% stenosed. First Diagonal Branch   1st Diag lesion, 95% stenosed. Left Circumflex   Mid Cx lesion, 90% stenosed. First Obtuse Marginal Branch   1st Mrg lesion, 70% stenosed. Second Obtuse Marginal Branch   2nd Mrg lesion, 40% stenosed. Third Obtuse Marginal Branch   The vessel was visualized by angiography. The vessel exhibits minimal luminal irregularities. Right Coronary Artery   Mid RCA lesion, 50% stenosed. Dist RCA lesion, 80% stenosed. Intervention    No interventions have been documented. Left Heart    Left Ventricle The left ventricular size is normal. There is mild left ventricular systolic dysfunction. LV systolic pressure is normal. LV end diastolic pressure is normal. The calculated ejection fraction is 50%. There are wall motion abnormalities in the left ventricle. There is no evidence of mitral regurgitation. Aortic Valve There is no aortic valve stenosis. Wall Motion                ECHO: None. Patient states he had a stress test but I do not have record of this. Past Medical History:   Diagnosis Date    CAD (coronary artery disease)     Chronic lower back pain     HLD (hyperlipidemia)     HTN (hypertension)     Kidney stones      Past Surgical History:   Procedure Laterality Date    HX HERNIA REPAIR Right     HX PROSTATE SURGERY      \"Urolift\"      Social History     Tobacco Use    Smoking status: Former Smoker     Packs/day: 0.50     Years: 36.00     Pack years: 18.00     Types: Cigarettes     Quit date: 2002     Years since quittin.0    Smokeless tobacco: Never Used   Substance Use Topics    Alcohol use: Not on file      Family History   Problem Relation Age of Onset    Alzheimer Mother     Heart Disease Father     Heart Surgery Father      Prior to Admission medications    Medication Sig Start Date End Date Taking? Authorizing Provider   atorvastatin (LIPITOR) 10 mg tablet Take 10 mg by mouth daily. Provider, Historical   aspirin delayed-release 81 mg tablet Take 81 mg by mouth daily. Provider, Historical   metoprolol tartrate (LOPRESSOR) 25 mg tablet Take 25 mg by mouth two (2) times a day. Provider, Historical   tadalafiL (Cialis) 5 mg tablet Take 5 mg by mouth. Provider, Historical   tadalafiL (Cialis) 10 mg tablet Take 10 mg by mouth. Provider, Historical       No Known Allergies      Review of Systems:   Consititutional: Denies fever or chills.   Eyes:  Denies use of glasses or vision problems(cataracts). ENT:  Denies hearing or swallowing difficulty. CV: Denies claudication, + CP, +HTN. Resp: Denies dyspnea, productive cough. +Shortness of breath on exertion  : Denies dialysis or kidney problems. GI: Denies ulcers, esophageal strictures, liver problems. M/S: Denies joint or bone problems, or implanted artificial hardware. Skin: Denies varicose veins, edema. Neuro: Denies strokes, or TIAs. Psych: Denies anxiety or depression. Endocrine: Denies thyroid problems or diabetes. Heme/Lymphatic: Denies easy bruising or lymphedema. Objective:     VS:   Visit Vitals  BP (!) 153/89 (BP 1 Location: Left upper arm, BP Patient Position: At rest)   Pulse (!) 53   Temp 97.8 °F (36.6 °C)   Resp 20   Wt 154 lb 8.7 oz (70.1 kg)   SpO2 98%   BMI 26.53 kg/m²         Physical Exam:    General appearance: alert, cooperative, no distress  Head: normocephalic, without obvious abnormality; atraumatic  Eyes: conjunctivae/corneas clear; EOM's intact. Nose: nares normal; no drainage. Neck: no carotid bruit and no JVD  Lungs: clear to auscultation bilaterally  Heart: regular rate and rhythm; no murmur  Abdomen: soft, non-tender; bowel sounds normal  Extremities: moves all extremities; no weakness. Skin: Skin color normal; No varicose veins or edema. Neurologic: Grossly normal      Labs:   Recent Labs     03/05/21  1542 03/05/21  1117   WBC 5.1  --    HGB 11.9*  --    HCT 38.0  --      --      --    K 4.1  --    BUN 21*  --    CREA 1.28  --    *  --    GLUCPOC  --  124*   INR 1.1  --        Diagnostics:   PA and lateral:   CXR Results  (Last 48 hours)               03/05/21 1552  XR CHEST PORT Final result    Impression:  No acute findings. Narrative:  EXAM: XR CHEST PORT       INDICATION: Preop heart       COMPARISON: None. FINDINGS: A portable AP radiograph of the chest was obtained at 1549 hours. There is no pneumothorax or pleural effusion. The lungs are clear.  The cardiac   and mediastinal contours and pulmonary vascularity are normal.  Hilar contours   are normal. No osseous abnormality is shown. Carotid doppler: 21   Interpretation Summary       Findings are consistent with less than 50% stenosis of the right internal carotid and less than 50% stenosis of the left internal carotid. Vertebrals are patent with antegrade flow       PFTS-FEV1: None    EK/05/21   3/5/2021  3:44 PM - Aj, Card Result In    Component Value Ref Range & Units Status   Ventricular Rate 46  BPM Preliminary   Atrial Rate 46  BPM Preliminary   P-R Interval 170  ms Preliminary   QRS Duration 90  ms Preliminary   Q-T Interval 434  ms Preliminary   QTC Calculation (Bezet) 379  ms Preliminary   Calculated P Axis 58  degrees Preliminary   Calculated R Axis 19  degrees Preliminary   Calculated T Axis 43  degrees Preliminary   Diagnosis Marked sinus bradycardia   No previous ECGs available         Assessment:     Active Problems:    CAD (coronary artery disease) (3/5/2021)        Plan:   The risk and benefit of surgery were reviewed with patient and family and all questions answered and the patient wishes to proceed. Risk include infection, bleeding, stroke, heart attack, irregular heart rhythm, kidney failure and death. The patient was given instructions    Surgery is scheduled for 3/9/21. STS Adult Cardiac Surgery Database Version 4.20  RISK SCORES  Procedure: Isolated CAB  Risk of Mortality: 0.961%  Renal Failure: 1.347%  Permanent Stroke: 0.829%  Prolonged Ventilation: 3.595%  DSW Infection: 0.159%  Reoperation: 2.343%  Morbidity or Mortality: 6.246%  Short Length of Stay: 57.904%  Long Length of Stay: 2.470%    Treatment Plan:      1. 3 vessel CAD: On ASA, Statin, BB, NTG paste, Lovenox (last dose Monday). Preoperative education and testing ongoing. No Amiodarone due to intermittent bradycardia. 2. HTN: This is a recent diagnosis for him.  On Metoprolol, NTG paste, PRN Hydralazine. 3. HLD:On Statin    4. Chronic lower back pain: Had seen a Neurosurgeon and was planning for back surgery but this is currently on hold. Dispo: Keep in CCU for now. Tentative plan for CABG 3/9 per Dr. Liz Ramirez.         Signed By: Ivan Ayala NP     March 5, 2021

## 2021-03-05 NOTE — H&P
Date of Surgery Update:  Travis Ryan was seen and examined. History and physical has been reviewed. The patient has been examined. There have been no significant clinical changes since the completion of the originally dated History and Physical.    Signed By: Martin Canela MD     March 5, 2021 8:00 AM         +CP  Exercise echo: +septum, apex. Tayla Ojeda 1950   Office/Outpatient Visit  Visit Date: Estrella Clifford 11:00 am  Provider: Smita Ayoub MD (Assistant: Nila [place], [place] )  Location: [facility] AT [facility])[address] [place]. [address] [phone]    Electronically signed by Mady Olson MD on  03/03/2021 04:43:47 PM                           Subjective:    CC: Mr. Lennox Moros is a 69year old White male. His primary care physician is Gerber Murillo. This is his first visit to the clinic. He has referred himself to our practice because he wants to establish a cardiologist. New to our area He presents today with a complaint of chest pain. He is here today following a transition of care from his primary care provider. Medication bottles reviewed. HPI:           Regarding chest pain:  MD Notes: Patient's father had bypass surgery in his 76s and his daughter recently had stent placement at the age of 39. The discomfort is located primarily in the center of the chest.  The pain initially began one month ago. Typically, individual episodes of chest pain are variable in duration. He characterizes the pain as tightness. It seems to be worse with exertion and walking. Pain improves with rest.  Associated symptoms include dyspnea. Regarding dyspnea/shortness of breath: This tends to be worse with exertion. The shortness of breath is better rest.            Hypercholesterolemia: Current treatment includes diet. Coronary Artery Disease: Current symptoms include angina and chest pain.           MD Notes: Positive chest pain during stress echo with septal and apical hypokinesis. Abnormal result of other cardiovascular function study noted. ROS:   GENERAL:  Denies recent weight gain or weight loss. EYES:  Denies double vision. ENT:  Denies tinnitus. No nose bleeding. ENDOCRINE:  Negative for temperature intolerances and excessive sweating. CARDIOVASCULAR:  Please see HPI. RESPIRATORY:   No chronic cough, hemoptysis or pleuritic pain. GI:  No nausea, vomiting, hematemesis, diarrhea or melena. :  No dysuria, polyuria or hematuria. HEMATOLOGIC/LYMPHATIC:  Negative for easy bruising and excessive bleeding. NEUROLOGICAL:  Negative for seizures and vertigo. Past Medical History / Family History / Social History:     Last Reviewed on 3/03/2021 11:14 AM by Bernice Cordon  Past Medical History:     BPH   Renal Stones   INFLUENZA VACCINE: was last done    COVID-19 VACCINE: The next one is due  2021 pfizer   PNEUMOCOCCAL VACCINE: was last done      Surgical History:   Surgical/Procedural History:   Hernia Repair     Family History:   Father:  at age 80;  hypertension; triple bypass;  renal stones; kidney;  Parkinson's disease;  type 2 diabetes; obesity   Mother: Cause of death was stroke; Alzheimer's disease;  type 2 diabetes     Social History:   Social History:   Occupation: Retired   Marital Status:    Children: 3 children     Tobacco/Alcohol/Supplements:   Last Reviewed on 3/03/2021 11:14 AM by Bernice Cordon  TOBACCO/ALCOHOL/SUPPLEMENTS   Tobacco: Non- smoker He has a past history of cigarette smoking; quit . Alcohol: Drinks alcohol occasionally. Caffeine:  He admits to consuming caffeine via coffee ( 2 servings per day ).       Substance Abuse History:   Last Reviewed on 3/03/2021 11:14 AM by Kim LOZA  Substance Use/Abuse:   None     Mental Health History:   Last Reviewed on 3/03/2021 11:14 AM by Eloy Martinez (eg STDs): Last Reviewed on 3/03/2021 11:14 AM by Diana LOZA    Current Problems:   Last Reviewed on 3/03/2021 11:14 AM by Diana LOZA  Chest pain, unspecified    Current Medications:   Last Reviewed on 3/03/2021 11:14 AM by Diana LOZA  Metamucil  [1 po qd]  Align 4 mg oral capsule [1 po qd]  patriot powder 1 scoop [1 qd]  heal and soothe  [3 am 3 pm]  cholesterol 360  `600 mg [1 po qd]  magnesium 360 400mg [1 qhs]  perfect amino 5000 mg  [1 po qd]  ocean pure  [1 po qd]  max q-10 685 mg [1 po qd]  max b-12 5000mcg [1 po qd]  super joint  [1 po qd]  vitamin c 1200mg [1 am 1 pm]  tadalafiL 5 mg oral tablet [prn]  purZanthin 12 mg [1 po qd]  advanced brain power  [1 po qd]  metablolic rescue  [1 po qd]  ocuxanthin  [1 po qd]  prosta-vine  [1 po qd]  tumeric  [1 po qd]  immunity oral spray  [10 sprays 1 qd]  pure d  [1 po qd]  bionic plus  [1 pack per day]  cbd 300mg [1 po qd]    Objective:    Vitals:     Current: 3/3/2021 11:11:39 AM  Ht:  5 ft, 4 in; Wt: 159 lbs;  BMI: 27. 3BP: 147/103 mm Hg (right arm, sitting);  P: 82 bpm    Repeat:   11:11:53 AM  BP:   144/102mm Hg (right arm, standing)   Exams:   GENERAL:  Alert, oriented to person, place and time. HEENT:  Pinkish palpebral  conjunctivae. Anicteric sclerae. NECK:  No jugular vein engorgement. No bruit. CHEST: Equal expansion. Clear breath sounds. No rales, no wheezing. Heart: Reg rate and rhythm. Grade 2/6 systolic ejection murmur at the left sternal border area. ABDOMEN:  Soft. Normal active bowel sounds. No tenderness. EXTREMITIES:  No pitting pedal edema. Equal pulses bilaterally. NEURO:  Grossly intact. Procedures:   Chest pain, unspecified    ECG INTERPRETATION: See scanned EKG for results.         Assessment:     R07.9   Chest pain, unspecified     R06.02   Shortness of breath     E78.00   Pure hypercholesterolemia, unspecified     R01.1   Cardiac murmur, unspecified     I25.119   Atherosclerotic heart disease of native coronary artery with unspecified angina pectoris     R94.39   Abnormal result of other cardiovascular function study       ORDERS:     Procedures Ordered:     05758  Education and train for pt self-mgmt by qualified, nonphysician, janice 30 minutes; individual pt  (Send-Out)          43980  Electrocardiogram, routine with at least 12 leads; with interpretation and report  (In-House)          XSE  Stress Echo  (In-House)          [phone]  EBT, heart, w/o contrast material, w/quant eval of coronary calcium  (Send-Out)          XCATH  Cardiac Cath  (In-House)          Other Orders:     [id number]  Queried Patient for Tobacco Use  (Send-Out)              Plan:     Chest pain, unspecified  1. Medication list has been reviewed. Start the following medication(s):  Atorvastatin and over the counter Baby Aspirin 81 mg. Take one tablet daily with food. Metoprolol. .    2.  Advised the patient regarding diet, exercise, and lifestyle modification. 3.  The patient to call the office if there is any change in his cardiac symptoms. 4.  Explained to the patient the importance of controlling his cardiac risk factors. Testing/Procedures:  EBT Calcium Scoring - this week  Stress echocardiogram -  to be done this week Cardiac Catheterization  Explained to the patient the indication, procedure, risks, and benefits of cardiac catheterization. The patient understands  and wishes to proceed with the cath to be performed as an outpatient this week by Dr. Alise Darby. Schedule a follow-up appointment in 1 week. Also recommended that patient avoid exercise and weight lifting for the time being. The above note was transcribed by Rosalino Underwood and authenticated by Dr. Alida Amaya prior to sign off.

## 2021-03-05 NOTE — PROGRESS NOTES
0800 TRANSFER - OUT REPORT:    Verbal report given to Yariel NORTON (name) on Adrian Sierra  being transferred to  Cath lab(unit) for routine progression of care       Report consisted of patients Situation, Background, Assessment and   Recommendations(SBAR). Information from the following report(s) SBAR was reviewed with the receiving nurse. Lines:   Peripheral IV 03/05/21 Left Arm (Active)        Opportunity for questions and clarification was provided.       Patient transported with:   [employer]

## 2021-03-05 NOTE — PROGRESS NOTES
Patient arrived. ID and allergies verified verbally with patient. Pt voices understanding of procedure to be performed. Consent obtained. Pt prepped for procedure.

## 2021-03-05 NOTE — PROGRESS NOTES
Spiritual Care Assessment/Progress Note  [address]      NAME: Terence Patel      MRN: [id number]  AGE: 69 y.o. SEX: male  Gnosticism Affiliation: No preference   Language: English     3/5/2021     Total Time (in minutes): 15     Spiritual Assessment begun in [facility] PACU through conversation with:         []Patient        [x] Family    [] Friend(s)        Reason for Consult: Other (comment)(Code Stroke)     Spiritual beliefs: (Please include comment if needed)     [x] Identifies with a yamil tradition: [facility]        [] Supported by a yamil community:            [] Claims no spiritual orientation:           [] Seeking spiritual identity:                [] Adheres to an individual form of spirituality:           [] Not able to assess:                           Identified resources for coping:      [x] Prayer                               [] Music                  [] Guided Imagery     [x] Family/friends                 [] Pet visits     [] Devotional reading                         [] Unknown     [] Other:                                               Interventions offered during this visit: (See comments for more details)          Family/Friend(s):  Affirmation of emotions/emotional suffering, Affirmation of yamil, Catharsis/review of pertinent events in supportive environment, Coping skills reviewed/reinforced, Iconic (affirming the presence of God/Higher Power)     Plan of Care:     [] Support spiritual and/or cultural needs    [] Support AMD and/or advance care planning process      [] Support grieving process   [] Coordinate Rites and/or Rituals    [] Coordination with community clergy   [] No spiritual needs identified at this time   [] Detailed Plan of Care below (See Comments)  [] Make referral to Music Therapy  [] Make referral to Pet Therapy     [] Make referral to Addiction services  [] Make referral to [facility]  [] Make referral to Spiritual Care Partner  [] No future visits requested [x] Follow up upon further referrals     Comments:  responded to Code Stroke in Cath Lab Recovery bed #12. Staff with patient providing care. Patient taken for CT scan. Met with patient's wife in waiting area. Physician spoke to wife, Heidi Caraballor her of her 's status and providing assurance. Afterward, provided spiritual presence and listening as Manus Perks spoke of her present thoughts, feelings, and concerns. Spoke of her 's health and events leading to this hospitalization. Spoke of their yamil saying they have many people praying for the patient and spoke about how she has placed his care in God's hands and is comforted knowing God is in control. Good family support. The patient had worked for [employer] in [facility], last time in [place]. Moved here recently to be closer to family and still becoming accustomed to [facility] and knowing their way around here. Says they are awaiting possible transfer to [facility] for the possibility of heart surgery and described her feelings and emotions surrounding this. Provided words of comfort, assurance, and support. She appeared comforted as a result of this visit and expressed gratitude for this visit. Rev.  Luis Cadena MDiv, [facility], [facility]   paging service: [phone])

## 2021-03-05 NOTE — DISCHARGE INSTRUCTIONS
Discharge Instructions:     Medications: Activity:     As tolerated except do not lift over 10 pounds for 5 days. Diet:     American Heart Association. Follow-up:     Follow up with Kinsey Oden MD on March 11th, 2021 at 1:40pm.  [address] [facility]  [phone]      If you smoke, STOP!

## 2021-03-05 NOTE — Clinical Note
TRANSFER - OUT REPORT:     Verbal report given to: Ed RN. Report consisted of patient's Situation, Background, Assessment and   Recommendations(SBAR). Opportunity for questions and clarification was provided. Patient transported with a Registered Nurse and [address] [place] [address] / [facility]. [place] [facility]

## 2021-03-05 NOTE — Clinical Note
TRANSFER - IN REPORT:     Verbal report received from: Ed RN. Report consisted of patient's Situation, Background, Assessment and   Recommendations(SBAR). Opportunity for questions and clarification was provided. Assessment completed upon patient's arrival to unit and care assumed. Patient transported with a Registered Nurse.

## 2021-03-06 ENCOUNTER — APPOINTMENT (OUTPATIENT)
Dept: MRI IMAGING | Age: 71
DRG: 302 | End: 2021-03-06
Attending: THORACIC SURGERY (CARDIOTHORACIC VASCULAR SURGERY)
Payer: MEDICARE

## 2021-03-06 ENCOUNTER — APPOINTMENT (OUTPATIENT)
Dept: VASCULAR SURGERY | Age: 71
DRG: 302 | End: 2021-03-06
Attending: NURSE PRACTITIONER
Payer: MEDICARE

## 2021-03-06 PROCEDURE — 74011250636 HC RX REV CODE- 250/636: Performed by: NURSE PRACTITIONER

## 2021-03-06 PROCEDURE — 74011250637 HC RX REV CODE- 250/637: Performed by: NURSE PRACTITIONER

## 2021-03-06 PROCEDURE — 70553 MRI BRAIN STEM W/O & W/DYE: CPT

## 2021-03-06 PROCEDURE — 93922 UPR/L XTREMITY ART 2 LEVELS: CPT

## 2021-03-06 PROCEDURE — 99221 1ST HOSP IP/OBS SF/LOW 40: CPT | Performed by: THORACIC SURGERY (CARDIOTHORACIC VASCULAR SURGERY)

## 2021-03-06 PROCEDURE — 74011250636 HC RX REV CODE- 250/636: Performed by: THORACIC SURGERY (CARDIOTHORACIC VASCULAR SURGERY)

## 2021-03-06 PROCEDURE — 65620000000 HC RM CCU GENERAL

## 2021-03-06 PROCEDURE — A9575 INJ GADOTERATE MEGLUMI 0.1ML: HCPCS | Performed by: THORACIC SURGERY (CARDIOTHORACIC VASCULAR SURGERY)

## 2021-03-06 RX ORDER — GADOTERATE MEGLUMINE 376.9 MG/ML
14 INJECTION INTRAVENOUS
Status: COMPLETED | OUTPATIENT
Start: 2021-03-06 | End: 2021-03-06

## 2021-03-06 RX ORDER — SODIUM CHLORIDE 0.9 % (FLUSH) 0.9 %
10 SYRINGE (ML) INJECTION
Status: COMPLETED | OUTPATIENT
Start: 2021-03-06 | End: 2021-03-06

## 2021-03-06 RX ADMIN — ENOXAPARIN SODIUM 70 MG: 80 INJECTION SUBCUTANEOUS at 19:04

## 2021-03-06 RX ADMIN — NITROGLYCERIN 1 INCH: 20 OINTMENT TOPICAL at 09:35

## 2021-03-06 RX ADMIN — GADOTERATE MEGLUMINE 14 ML: 376.9 INJECTION INTRAVENOUS at 16:35

## 2021-03-06 RX ADMIN — ENOXAPARIN SODIUM 70 MG: 80 INJECTION SUBCUTANEOUS at 06:25

## 2021-03-06 RX ADMIN — Medication 10 ML: at 06:26

## 2021-03-06 RX ADMIN — NITROGLYCERIN 1 INCH: 20 OINTMENT TOPICAL at 18:00

## 2021-03-06 RX ADMIN — Medication 10 ML: at 14:00

## 2021-03-06 RX ADMIN — Medication 10 ML: at 16:35

## 2021-03-06 RX ADMIN — ACETAMINOPHEN 650 MG: 325 TABLET ORAL at 20:26

## 2021-03-06 RX ADMIN — METOPROLOL TARTRATE 25 MG: 25 TABLET, FILM COATED ORAL at 19:05

## 2021-03-06 RX ADMIN — Medication 10 ML: at 20:27

## 2021-03-06 RX ADMIN — ASPIRIN 81 MG: 81 TABLET, COATED ORAL at 09:35

## 2021-03-06 RX ADMIN — ATORVASTATIN CALCIUM 10 MG: 10 TABLET, FILM COATED ORAL at 09:34

## 2021-03-06 NOTE — CONSULTS
[address]    Name:  Jarrett Oconnell  MR#:  [id number]  :  1950  ACCOUNT #:  [de-identified]  DATE OF SERVICE:  2021    HISTORY OF PRESENT ILLNESS:  The patient is a 48-year-old male, received in transfer from [facility] following a cardiac catheterization by Dr. Kirsten Ríos. He underwent a stress test by Dr. Constance Gomes, and had an antecedent history to that test with a 2- to 3-week history of chest discomfort with activity. He is quite active with no prior cardiac history noted. This pain without associated shortness of breath or diaphoresis while cycling. He denies any radiation of the pain. As noted above, he has no prior cardiac history. His risk factors for coronary disease include a previous history of smoking, which he stopped in . PAST MEDICAL HISTORY:  His other past medical history includes lower back pain, hyperlipidemia and hypertension. He also had previous hernia repair and prostate surgery. REVIEW OF SYSTEMS:  He was noted to have blurry vision and dizziness following his cardiac catheterization. He underwent a code \"neuro\" at [facility] with a negative neurologic evaluation and a normal CT scan. However, this morning, he reports a small persisting change in his vision. He denies any other dizziness. He has no prior history of coronary events. PHYSICAL EXAMINATION:  VITAL SIGNS:  His blood pressure is 140/70. His pulse is 50 and regular. NECK:  There are no cervical bruits. No neck masses. HEART:  His heart is in a regular rhythm without murmurs. LUNGS:  His lung fields are clear. ABDOMEN:  Soft and nontender. EXTREMITIES:  His distal pulses are intact bilaterally, and he has no peripheral edema.     DIAGNOSTIC DATA:  His cardiac catheterization shows high-grade multivessel disease and preserved left ventricular systolic function, including multiple lesions in the left anterior descending, the circumflex including the first and second marginal, as well as some disease in the distal right coronary artery. IMPRESSION:  1. Progressive angina and multivessel disease of the native coronary arteries. 2.  History of blurred vision with negative CAT scan and negative carotid Dopplers. PLAN:  The patient will undergo an MRI because of some persisting concern with his visual changes. I discussed also with him the indications, risks, and postoperative course for the patient undergoing coronary artery bypass surgery and answered his questions.       MD GUERRERO [id number]  D:  03/06/2021 9:04  T:  03/06/2021 10:04  JOB #:  [id number]

## 2021-03-06 NOTE — CONSULTS
Full consult dictated. I have reviewed the cath findings with the patient. I have discussed the risks, indications, and alternatives to surgical intervention. I have discussed the anticipated post operative course and given the patient a opportunity to ask questions. Risk stratification reviewed

## 2021-03-06 NOTE — PROGRESS NOTES
1430 Bedside and Verbal shift change report given to Gideon Alfredo (oncoming nurse) by Monae Price (offgoing nurse). Report included the following information SBAR, Kardex, ED Summary, Procedure Summary, Intake/Output, MAR, Accordion and Recent Results. 32 61 16 Pt arrived to unit Primary Nurse Ian Love, ROYA and Audelia RN performed a dual skin assessment on this patient No impairment noted    Current Bed:     [facility]    Kasi score is 21    1930 Bedside and Verbal shift change report given to RICA (oncoming nurse) by Gideon Alfredo (offgoing nurse). Report included the following information SBAR, Kardex, ED Summary, Procedure Summary, Intake/Output, MAR, Accordion and Recent Results.

## 2021-03-06 NOTE — PROGRESS NOTES
2000 - Report received from [place], RN. JYOTI, R groin site intact, pp+. No c/o CP. Bedside and Verbal shift change report given to Adelso Serra (oncoming nurse) by [facility] (offgoing nurse). Report included the following information SBAR, Kardex, Intake/Output, MAR, Accordion, Recent Results, Cardiac Rhythm -SB and Alarm Parameters .

## 2021-03-07 PROBLEM — I63.9 ACUTE CVA (CEREBROVASCULAR ACCIDENT) (HCC): Status: ACTIVE | Noted: 2021-03-06

## 2021-03-07 PROCEDURE — 99222 1ST HOSP IP/OBS MODERATE 55: CPT | Performed by: PSYCHIATRY & NEUROLOGY

## 2021-03-07 PROCEDURE — 74011250637 HC RX REV CODE- 250/637: Performed by: NURSE PRACTITIONER

## 2021-03-07 PROCEDURE — APPSS180 APP SPLIT SHARED TIME > 60 MINUTES: Performed by: NURSE PRACTITIONER

## 2021-03-07 PROCEDURE — 99231 SBSQ HOSP IP/OBS SF/LOW 25: CPT | Performed by: THORACIC SURGERY (CARDIOTHORACIC VASCULAR SURGERY)

## 2021-03-07 PROCEDURE — 74011250636 HC RX REV CODE- 250/636: Performed by: NURSE PRACTITIONER

## 2021-03-07 PROCEDURE — 97161 PT EVAL LOW COMPLEX 20 MIN: CPT

## 2021-03-07 PROCEDURE — 65620000000 HC RM CCU GENERAL

## 2021-03-07 RX ORDER — ATORVASTATIN CALCIUM 40 MG/1
40 TABLET, FILM COATED ORAL DAILY
Status: DISCONTINUED | OUTPATIENT
Start: 2021-03-08 | End: 2021-03-08 | Stop reason: HOSPADM

## 2021-03-07 RX ADMIN — Medication 10 ML: at 06:59

## 2021-03-07 RX ADMIN — ENOXAPARIN SODIUM 70 MG: 80 INJECTION SUBCUTANEOUS at 17:12

## 2021-03-07 RX ADMIN — Medication 10 ML: at 22:00

## 2021-03-07 RX ADMIN — Medication 10 ML: at 14:00

## 2021-03-07 RX ADMIN — METOPROLOL TARTRATE 25 MG: 25 TABLET, FILM COATED ORAL at 17:11

## 2021-03-07 RX ADMIN — ENOXAPARIN SODIUM 70 MG: 80 INJECTION SUBCUTANEOUS at 06:58

## 2021-03-07 RX ADMIN — METOPROLOL TARTRATE 25 MG: 25 TABLET, FILM COATED ORAL at 08:02

## 2021-03-07 RX ADMIN — NITROGLYCERIN 1 INCH: 20 OINTMENT TOPICAL at 08:02

## 2021-03-07 RX ADMIN — ATORVASTATIN CALCIUM 10 MG: 10 TABLET, FILM COATED ORAL at 08:02

## 2021-03-07 RX ADMIN — NITROGLYCERIN 1 INCH: 20 OINTMENT TOPICAL at 17:12

## 2021-03-07 RX ADMIN — ASPIRIN 81 MG: 81 TABLET, COATED ORAL at 08:02

## 2021-03-07 NOTE — PROGRESS NOTES
Informed Dr. Laurie Pimentel on Neurology recommendations with timing of patient's upcoming surgery in the setting of recent acute stroke. (See Dr. Jing Coreas attestation of my note today regarding her recommendations).      Thea Tripathi, [facility]  Neurocritical care NP

## 2021-03-07 NOTE — CONSULTS
Neurology Consult  Patrice Vaughan [facility]  Neurocritical Care NP      Patient: Peggy Ball MRN: [id number]  SSN: [id number]    YOB: 1950  Age: 69 y.o. Sex: male        Chief Complaint: vision changes     Subjective:      Peggy Ball is a 69 y.o. male with a PMH of newly diagnosed CAD, chronic lower back pain, osteoarthritis (patient reports he takes several herbal supplements for joint pain), HLD, HTN, and kidney stones who is being seen for acute strokes on MRI. The patient presented to [facility] on 3/5/2021 for a scheduled cardiac catheterization due to complaints of a 2-3 week history of exercise-induced chest tightness. Following the procedure on Friday he developed new onset visual disturbances described as a \"worm-like, caterpillar\" appearance of different colors in both eyes. He feels like the left eye is worse than the right eye. He denies any other neurological symptoms. A Code Stroke was called at [facility] and a CT of the Head was performed showing no acute process. MRI of Brain was completed on 3/6 showing small acute infarcts in the right corona radiata and right occipital lobe. Few punctate acute infarcts in the left occipital lobe. These are favored to be embolic given distribution. Minimal chronic microvascular ischemic disease. Small area of encephalomalacia in the left inferior frontal lobe. He was transferred to [facility] for further evaluation. Patient also reports that yesterday he noticed some flashing in his vision peripherally on both sides. He reports that he feels his vision has slightly improved since Friday, but he still endorses seeing the worm-like pattern he saw before. He denies any prior history of a stroke or TIA. He does not take any antiplatelet drugs at home. He does report a strong family history of strokes.  Per review of notes, his cardiac catheterization showed high-grade multivessel disease with preserved left ventricular systolic function, with multiple lesions in the left anterior descending, the circumflex including the first and second marginal, as well as some disease in the distal right coronary artery. He is being followed by Dr. Rachna Dudley and CABG surgery is indicated based on the cardiac cath findings. He denies any headache, dizziness, chest pain, SOB, nausea, vomiting, numbness, tingling, speech difficulty, weakness, balance or coordination issues. He does complain of vision disturbances.      Past Medical History:   Diagnosis Date    CAD (coronary artery disease)     Chronic lower back pain     HLD (hyperlipidemia)     HTN (hypertension)     Osteoarthritis      Kidney stones      Past Surgical History:   Procedure Laterality Date    HX HERNIA REPAIR Right     HX PROSTATE SURGERY      Urolift     HX of right shoulder surgery due to bone spurs       Family History   Problem Relation Age of Onset    Alzheimer Mother     Heart Disease Father     Heart Surgery Father    Additionally, patient reports he has a strong family history of strokes (Mother, two aunts, maternal grandmother all  of strokes), he reports his Uncle also had a stroke  Father-  at 80 due to kidney failure, has hx of pacemaker and heart disease/cardiac bypass surgery  2 brothers living and he is unsure of their medical problems, but he thinks 1 sibling has Diabetes    Social History     Tobacco Use    Smoking status: Former Smoker     Packs/day: 0.50     Years: 36.00     Pack years: 18.00     Types: Cigarettes     Quit date: 2002     Years since quittin.1    Smokeless tobacco: Never Used   Substance Use Topics    Alcohol use: Drinks 1-2 times a week (1-2 glasses of wine or 1-2 cans of beer a week). Occasionally drinks Rum     He denies any illicit drug use.      Current Facility-Administered Medications   Medication Dose Route Frequency Provider Last Rate Last Admin    [START ON 3/8/2021] atorvastatin (LIPITOR) tablet 40 mg  40 mg Oral DAILY Guevara Paris, ONUR        acetaminophen (TYLENOL) tablet 650 mg  650 mg Oral Q4H PRN Renetta Blood, NP   650 mg at 03/06/21 2026    ondansetron (ZOFRAN) injection 4 mg  4 mg IntraVENous Q4H PRN Renetta Blood, NP        aspirin delayed-release tablet 81 mg  81 mg Oral DAILY Renetta Blood, NP   81 mg at 03/07/21 0802    metoprolol tartrate (LOPRESSOR) tablet 25 mg  25 mg Oral BID Renetta Blood, NP   25 mg at 03/07/21 0802    sodium chloride (NS) flush 5-40 mL  5-40 mL IntraVENous Q8H Renetta Blood, NP   10 mL at 03/07/21 0659    sodium chloride (NS) flush 5-40 mL  5-40 mL IntraVENous PRN Carmen Chambers, NP        sodium phosphate (FLEET'S) enema 1 Enema  1 Enema Rectal PRN Carmen Chambers, ONUR        nitroglycerin (NITROBID) 2 % ointment 1 Inch  1 Inch Topical BID Adonna Garb, NP   1 Inch at 03/07/21 0802    hydrALAZINE (APRESOLINE) 20 mg/mL injection 10 mg  10 mg IntraVENous Q6H PRN Adonna Garb, NP        enoxaparin (LOVENOX) injection 70 mg  1 mg/kg SubCUTAneous Q12H Adonna Garb, NP   70 mg at 03/07/21 5584        No Known Allergies    Review of Systems:  Pertinent items are noted in HPI. Objective:     Vitals:    03/07/21 0800 03/07/21 0900 03/07/21 1000 03/07/21 1100   BP: (!) 137/93 118/67 105/69 133/84   Pulse: (!) 58 (!) 54 (!) 50 (!) 47   Resp: 14 17 20 14   Temp: 98.3 °F (36.8 °C)      SpO2: 97% 97% 96% 96%   Weight:       Height:            Physical Exam:  GENERAL: alert, cooperative, no distress, appears stated age  EYE: conjunctivae/corneas clear. PERRL, EOM's intact. NECK: Neck supple and symmetrical, no carotid bruits bilaterally   LUNG: clear to auscultation bilaterally  HEART: regular rate and rhythm, sinus bradycardia on the monitor, S1, S2 normal, no murmur, click, rub or gallop  EXTREMITIES:  extremities normal, atraumatic, no cyanosis or edema  SKIN: Skin warm to touch.  Appropriate for ethnicity       Neurologic Exam:  Mental Status:  Alert and oriented x 4.  Appropriate affect, mood and behavior. Language:    Normal fluency, repetition, comprehension and naming. Cranial Nerves:        Pupils equal, round and reactive to light. Visual fields full to confrontation. Extraocular movements intact. Facial sensation intact. Full facial strength, no asymmetry. Hearing grossly intact bilaterally. No dysarthria. Tongue protrudes to midline, palate elevates symmetrically. Motor:    No pronator drift. Bulk and tone normal.      5/5 power in all extremities proximally and distally. No involuntary movements. Sensation:    Sensation intact throughout to light touch. No neglect. Coordination & Gait: No ataxia with FTN and HTS bilaterally. Gait deferred. NIHSS:      1a-LOC:0    1b-Month/Age:0    1c-Open/Close Hand:0    2-Best Gaze:0    3-Visual Fields:0    4-Facial Palsy:0    5a-Left Arm:0    5b-Right Arm:0    6a-Left Le    6b-Right Le    7-Limb Ataxia:0    8-Sensory:0    9-Best Language:0    10-Dysarthria:0    11-Extinction/Inattention:0  TOTAL SCORE:0      Recent Results (from the past 24 hour(s))   ANKLE BRACHIAL INDEX    Collection Time: 21  1:55 PM   Result Value Ref Range    Left posterior tibial 155 mmHg    Left toe pressure 100 mmHg    Right anterior tibial 139 mmHg    Right posterior tibial 154 mmHg    Right toe pressure 90 mmHg    Right arm  mmHg    Left anterior tibial 135 mmHg    Left EDGARDO 1.24     Right EDGARDO 1.23     Left TBI 0.80     Right TBI 0.72        Imaging:  CT of Head on 3/5/2021 at 1128 shows  IMPRESSION  No acute abnormality demonstrated. MRI of Brain [facility] on 3/6/2021 at 1640 shows  IMPRESSION  1. Small acute infarcts in the right corona radiata and right occipital lobe. Few punctate acute infarcts in the left occipital lobe. These are favored to be  embolic given distribution. 2. Minimal chronic microvascular ischemic disease.   3. Small area of encephalomalacia in the left inferior frontal lobe. Bilateral carotid duplex on 3/5/2021 at 1421 shows  Findings are consistent with less than 50% stenosis of the right internal carotid and less than 50% stenosis of the left internal carotid. Vertebrals are patent with antegrade flow. Assessment:     Hospital Problems  Never Reviewed          Codes Class Noted POA    CAD (coronary artery disease) ICD-10-CM: I25.10  ICD-9-CM: 414.00  3/5/2021 Unknown            Multiple Acute Ischemic CVAs    This is a 69year-old male with a PMH significant for CAD, chronic lower back pain, osteoarthritis, HLD, HTN, and kidney stones who had an acute onset of visual disturbances s/p cardiac catheterization. CT of Head negative for acute process. MRI of the Brain showing small acute infarcts in the right corona radiata and right occipital lobe. Few punctate acute infarcts in the left occipital lobe. These are favored to be embolic given distribution. Minimal chronic microvascular ischemic disease. Small area of encephalomalacia in the left inferior frontal lobe. Bilateral carotid dopplers consistent with less than 50% stenosis of the right internal carotid and less than 50% stenosis of the left internal carotid. Vertebrals are patent with antegrade flow. NIHSS 0. Neuro exam non-focal. Suspect etiology of stroke is likely cardioembolic from cardiac cath procedure. Plan:   - check ECHO  - Hgb A1C ok at 5.3  - Lipid panel shows , goal <70, increase Lipitor to 40 mg daily   - PT/OT ordered to assess for any rehab needs  - continue Aspirin 81 mg daily for stroke prevention, patient is also on therapeutic Lovenox BID per cardiac surgery  - there is low risk of hemorrhagic conversion given small size of strokes on MRI  - would recommend waiting 3-4 weeks with proceeding with CABG surgery in the setting of recent acute stroke      Plan discussed with Dr. Christopher Leung, patient, and patient's wife.      Thank you for this consult and participating in the care of this patient.         Signed By: Liu Ramirez NP     March 7, 2021

## 2021-03-07 NOTE — PROGRESS NOTES
PHYSICAL THERAPY EVALUATION/DISCHARGE  Patient: Ailcia Lin (52 y.o. male)  Date: 3/7/2021  Primary Diagnosis: CAD (coronary artery disease) [I25.10]       Precautions: HR<140 bpm         ASSESSMENT  Based on the objective data described below, the patient presents with overall mobility at/near baseline function s/p admission for CAD. Pt undergoing CABG workup, however complicated by acute CVA - imaging positive for small acute infarct in the right corona radiata, right occipital lobe, and left occipital lobe. Pt complains of visual impairment however still functional.  Neuro exam appears otherwise non focal- strength, sensation, coordination, and speech all intact and at baseline level. Pt is independent with all mobility this date without difficulty. Educated patient on BE FAST acronym for signs/symptoms of stroke. Pt has no further mobility needs at this time, will sign off. Functional Outcome Measure: The patient scored 56/56 on the Gardner outcome measure which is indicative of low fall risk. Other factors to consider for discharge: independent baseline, acute CVA, CABG workup      Further skilled acute physical therapy is not indicated at this time. PLAN :  Recommendation for discharge: (in order for the patient to meet his/her long term goals)  No skilled physical therapy/ follow up rehabilitation needs identified at this time. This discharge recommendation:  Has not yet been discussed the attending provider and/or case management    IF patient discharges home will need the following DME: none       SUBJECTIVE:   Patient stated I am moving just fine.     OBJECTIVE DATA SUMMARY:   HISTORY:    Past Medical History:   Diagnosis Date    CAD (coronary artery disease)     Chronic lower back pain     HLD (hyperlipidemia)     HTN (hypertension)     Kidney stones      Past Surgical History:   Procedure Laterality Date    HX HERNIA REPAIR Right     HX PROSTATE SURGERY      \"Urolift\" Prior level of function: Lives with wife, fully independent and active at baseline          EXAMINATION/PRESENTATION/DECISION MAKING:     Range Of Motion:  AROM: Within functional limits           PROM: Within functional limits           Strength:    Strength:  Within functional limits                    Tone & Sensation:   Tone: Normal              Sensation: Intact               Coordination:  Coordination: Within functional limits  Vision:      Functional Mobility:  Bed Mobility:     Supine to Sit: Independent  Sit to Supine: Independent     Transfers:  Sit to Stand: Independent  Stand to Sit: Independent  Stand Pivot Transfers: Independent     Bed to Chair: Independent              Balance:   Sitting: Intact  Standing: Intact  Ambulation/Gait Training:  Gait Description (WDL): Within defined limits    Functional Measure:  Gardner Balance Test:    Sitting to Standin  Standing Unsupported: 4  Sitting with Back Unsupported: 4  Standing to Sittin  Transfers: 4  Standing Unsupported with Eyes Closed: 4  Standing Unsupported with Feet Together: 4  Reach Forward with Outstretched Arm: 4   Object: 4  Turn to Look Over Shoulders: 4  Turn 360 Degrees: 4  Alternate Foot on Step/Stool: 4  Standing Unsupported One Foot in Front: 4  Stand on One Le  Total: 56/56         56=Maximum possible score;   0-20=High fall risk  21-40=Moderate fall risk   41-56=Low fall risk        Physical Therapy Evaluation Charge Determination   History Examination Presentation Decision-Making   HIGH Complexity :3+ comorbidities / personal factors will impact the outcome/ POC  HIGH Complexity : 4+ Standardized tests and measures addressing body structure, function, activity limitation and / or participation in recreation  LOW Complexity : Stable, uncomplicated  Other outcome measures Gardner  LOW       Based on the above components, the patient evaluation is determined to be of the following complexity level: LOW     Activity Tolerance: WNL      After treatment patient left in no apparent distress:   Sitting in chair, Call bell within reach and Caregiver / family present    COMMUNICATION/EDUCATION:   The patients plan of care was discussed with: Registered nurse. Fall prevention education was provided and the patient/caregiver indicated understanding., Patient/family have participated as able in goal setting and plan of care. and Patient/family agree to work toward stated goals and plan of care. Educated patient on BE FAST acronym for signs/symptoms of stroke.     Thank you for this referral.  Praneeth Wilks, PT, DPT   Time Calculation: 15 mins

## 2021-03-07 NOTE — PROGRESS NOTES
2000 - Report received from Gricelda Fox RN. VSS, R groin site intact, pp+. No c/o CP. Plan of care reviewed w/ pt.  0600 - Uneventful shift, VSS, no c/o. Bedside and Verbal shift change report given to Nelly Moran (oncoming nurse) by Rafy Ott (offgoing nurse). Report included the following information SBAR, Kardex, Intake/Output, MAR, Accordion, Recent Results, Cardiac Rhythm -SB and Alarm Parameters .

## 2021-03-07 NOTE — PROGRESS NOTES
No complaints other than stable visual changes  VSS  No chest pain  MRI results reviewed and discussed with neurology   She will review pt in am  Coronary disease is significant but may delay pending recommendation

## 2021-03-07 NOTE — PROGRESS NOTES
[facility] ICU Progress Note    Admit Date: 3/5/2021    Preop CABG       Subjective:   Pt seen with Dr. Rachel Gray. No new complaints. Objective:   Vitals:  Blood pressure (!) 137/93, pulse (!) 58, temperature 98.3 °F (36.8 °C), resp. rate 14, height 5' 4.17\" (1.63 m), weight 152 lb 12.5 oz (69.3 kg), SpO2 97 %. Temp (24hrs), Av.8 °F (36.6 °C), Min:97.6 °F (36.4 °C), Max:98.3 °F (36.8 °C)    EKG/Rhythm:    NSR    Oxygen Therapy:  Oxygen Therapy  O2 Sat (%): 97 % (21 0800)  Pulse via Oximetry: 58 beats per minute (21 0800)  O2 Device: Room air (21 0800)    CXR:  CXR Results  (Last 48 hours)               21 1552  XR CHEST PORT Final result    Impression:  No acute findings. Narrative:  EXAM: XR CHEST PORT       INDICATION: Preop heart       COMPARISON: None. FINDINGS: A portable AP radiograph of the chest was obtained at 1549 hours. There is no pneumothorax or pleural effusion. The lungs are clear. The cardiac   and mediastinal contours and pulmonary vascularity are normal.  Hilar contours   are normal. No osseous abnormality is shown. Admission Weight: Last Weight   Weight: 154 lb 8.7 oz (70.1 kg) Weight: 152 lb 12.5 oz (69.3 kg)     Intake / Output / Drain:  Current Shift:  0701 -  1900  In: 240 [P.O.:240]  Out: -   Last 24 hrs.:     Intake/Output Summary (Last 24 hours) at 3/7/2021 0846  Last data filed at 3/7/2021 0800  Gross per 24 hour   Intake 1200 ml   Output    Net 1200 ml       EXAM:  General:  NAD                                                                                            Lungs:   Clear to auscultation bilaterally. Heart:  Regular rate and rhythm, S1, S2 normal, no murmur, click, rub or gallop. Abdomen:   Soft, non-tender. Bowel sounds normal. No masses,  No organomegaly. Extremities:  No edema. PPP. Neurologic:  Gross motor and sensory apparatus intact.      Labs:   Recent Labs     21  1542 21  1113 WBC 5.1  --    HGB 11.9*  --    HCT 38.0  --      --      --    K 4.1  --    BUN 21*  --    CREA 1.28  --    *  --    GLUCPOC  --  124*   INR 1.1  --         Assessment:     Active Problems:    CAD (coronary artery disease) (3/5/2021)         Plan/Recommendations/Medical Decision Makin. CAD: Preop CABG. Workup in progress, but timing TBD due to acute CVA  2. Hypertension: Home meds  3. Hyperlipidemia: Statin  4. Acute CVA with visual field deficits: Multiple small infarcts seen on MRI. Neuro consulted.     Signed By: DMITRY Briseno

## 2021-03-07 NOTE — PROGRESS NOTES
Kimberly He received from [place] in am shift report. Pt alert, oriented, and fully intact. Dr. Lse Richards to bedside and spoke with neurology regarding the MRI read. Col. Kelsi Velazquez will need an echo prior to being discharged to await heart surgery. Plan discussed directly with patient and wife. Will continue to monitor and prep for discharge.

## 2021-03-08 ENCOUNTER — APPOINTMENT (OUTPATIENT)
Dept: NON INVASIVE DIAGNOSTICS | Age: 71
DRG: 302 | End: 2021-03-08
Attending: NURSE PRACTITIONER
Payer: MEDICARE

## 2021-03-08 VITALS
OXYGEN SATURATION: 99 % | TEMPERATURE: 97 F | HEIGHT: 64 IN | RESPIRATION RATE: 19 BRPM | HEART RATE: 47 BPM | DIASTOLIC BLOOD PRESSURE: 77 MMHG | BODY MASS INDEX: 25.97 KG/M2 | SYSTOLIC BLOOD PRESSURE: 137 MMHG | WEIGHT: 152.12 LBS

## 2021-03-08 LAB
ECHO AO ROOT DIAM: 3.62 CM
ECHO AR MAX VEL PISA: 323.68 CM/S
ECHO AV PEAK GRADIENT: 9.33 MMHG
ECHO AV PEAK VELOCITY: 152.72 CM/S
ECHO AV REGURGITANT PHT: 2323.92 MS
ECHO LA MAJOR AXIS: 3.64 CM
ECHO LA MINOR AXIS: 2.09 CM
ECHO LV INTERNAL DIMENSION DIASTOLIC: 3.65 CM (ref 4.2–5.9)
ECHO LV INTERNAL DIMENSION SYSTOLIC: 2.42 CM
ECHO LV IVSD: 1.08 CM (ref 0.6–1)
ECHO LV MASS 2D: 105.5 G (ref 88–224)
ECHO LV MASS INDEX 2D: 60.6 G/M2 (ref 49–115)
ECHO LV POSTERIOR WALL DIASTOLIC: 0.86 CM (ref 0.6–1)
ECHO LVOT PEAK GRADIENT: 4.46 MMHG
ECHO LVOT PEAK VELOCITY: 105.59 CM/S
ECHO MV A VELOCITY: 52.77 CM/S
ECHO MV AREA PHT: 3.6 CM2
ECHO MV E DECELERATION TIME (DT): 210.77 MS
ECHO MV E VELOCITY: 68.67 CM/S
ECHO MV E/A RATIO: 1.3
ECHO MV PRESSURE HALF TIME (PHT): 61.12 MS
ECHO PV PEAK INSTANTANEOUS GRADIENT SYSTOLIC: 3.31 MMHG
ECHO RV TAPSE: 1.88 CM (ref 1.5–2)
ECHO TV REGURGITANT MAX VELOCITY: 214.17 CM/S
ECHO TV REGURGITANT PEAK GRADIENT: 18.35 MMHG
LEFT ABI: 1.24
LEFT ANTERIOR TIBIAL: 135 MMHG
LEFT CCA DIST DIAS: 16.8 CM/S
LEFT CCA DIST SYS: 67.7 CM/S
LEFT CCA PROX DIAS: 22 CM/S
LEFT CCA PROX SYS: 89.9 CM/S
LEFT ECA DIAS: 0 CM/S
LEFT ECA SYS: 86 CM/S
LEFT ICA DIST DIAS: 28.6 CM/S
LEFT ICA DIST SYS: 70.3 CM/S
LEFT ICA MID DIAS: 22 CM/S
LEFT ICA MID SYS: 57.3 CM/S
LEFT ICA PROX DIAS: 18.1 CM/S
LEFT ICA PROX SYS: 67.7 CM/S
LEFT ICA/CCA SYS: 1.04
LEFT POSTERIOR TIBIAL: 155 MMHG
LEFT SUBCLAVIAN DIAS: 0 CM/S
LEFT SUBCLAVIAN SYS: 74.9 CM/S
LEFT TBI: 0.8
LEFT TOE PRESSURE: 100 MMHG
LEFT VERTEBRAL DIAS: 16.49 CM/S
LEFT VERTEBRAL SYS: 45.3 CM/S
RIGHT ABI: 1.23
RIGHT ANTERIOR TIBIAL: 139 MMHG
RIGHT ARM BP: 125 MMHG
RIGHT CCA DIST DIAS: 11.6 CM/S
RIGHT CCA DIST SYS: 61.2 CM/S
RIGHT CCA PROX DIAS: 20.7 CM/S
RIGHT CCA PROX SYS: 70.3 CM/S
RIGHT ECA DIAS: 6.3 CM/S
RIGHT ECA SYS: 90.3 CM/S
RIGHT ICA DIST DIAS: 14.3 CM/S
RIGHT ICA DIST SYS: 42.2 CM/S
RIGHT ICA MID DIAS: 21.9 CM/S
RIGHT ICA MID SYS: 72.3 CM/S
RIGHT ICA PROX DIAS: 15.6 CM/S
RIGHT ICA PROX SYS: 58.4 CM/S
RIGHT ICA/CCA SYS: 1.2
RIGHT POSTERIOR TIBIAL: 154 MMHG
RIGHT SUBCLAVIAN DIAS: 0 CM/S
RIGHT SUBCLAVIAN SYS: 42.9 CM/S
RIGHT TBI: 0.72
RIGHT TOE PRESSURE: 90 MMHG
RIGHT VERTEBRAL DIAS: 21.87 CM/S
RIGHT VERTEBRAL SYS: 56.8 CM/S

## 2021-03-08 PROCEDURE — 93306 TTE W/DOPPLER COMPLETE: CPT

## 2021-03-08 PROCEDURE — 74011250637 HC RX REV CODE- 250/637: Performed by: NURSE PRACTITIONER

## 2021-03-08 PROCEDURE — 77030027138 HC INCENT SPIROMETER -A

## 2021-03-08 PROCEDURE — 97535 SELF CARE MNGMENT TRAINING: CPT

## 2021-03-08 PROCEDURE — 74011250636 HC RX REV CODE- 250/636: Performed by: NURSE PRACTITIONER

## 2021-03-08 PROCEDURE — 99232 SBSQ HOSP IP/OBS MODERATE 35: CPT | Performed by: THORACIC SURGERY (CARDIOTHORACIC VASCULAR SURGERY)

## 2021-03-08 PROCEDURE — 93306 TTE W/DOPPLER COMPLETE: CPT | Performed by: SPECIALIST

## 2021-03-08 PROCEDURE — 97165 OT EVAL LOW COMPLEX 30 MIN: CPT

## 2021-03-08 RX ORDER — CLOPIDOGREL BISULFATE 75 MG/1
75 TABLET ORAL DAILY
Qty: 30 TAB | Refills: 1 | Status: SHIPPED | OUTPATIENT
Start: 2021-03-08 | End: 2021-04-30

## 2021-03-08 RX ORDER — ATORVASTATIN CALCIUM 40 MG/1
40 TABLET, FILM COATED ORAL DAILY
Qty: 30 TAB | Refills: 1 | Status: SHIPPED | OUTPATIENT
Start: 2021-03-08

## 2021-03-08 RX ORDER — ISOSORBIDE MONONITRATE 30 MG/1
30 TABLET, EXTENDED RELEASE ORAL
Qty: 30 TAB | Refills: 1 | Status: SHIPPED | OUTPATIENT
Start: 2021-03-08 | End: 2021-04-20

## 2021-03-08 RX ADMIN — Medication 10 ML: at 06:00

## 2021-03-08 RX ADMIN — ATORVASTATIN CALCIUM 40 MG: 40 TABLET, FILM COATED ORAL at 08:35

## 2021-03-08 RX ADMIN — METOPROLOL TARTRATE 25 MG: 25 TABLET, FILM COATED ORAL at 08:35

## 2021-03-08 RX ADMIN — NITROGLYCERIN 1 INCH: 20 OINTMENT TOPICAL at 08:35

## 2021-03-08 RX ADMIN — ENOXAPARIN SODIUM 70 MG: 80 INJECTION SUBCUTANEOUS at 05:40

## 2021-03-08 RX ADMIN — ASPIRIN 81 MG: 81 TABLET, COATED ORAL at 08:35

## 2021-03-08 RX ADMIN — Medication 10 ML: at 14:00

## 2021-03-08 NOTE — PROGRESS NOTES
Bedside shift change report given to Ernie Valdez RN (oncoming nurse) by Macie Mo RN (offgoing nurse). Report included the following information SBAR, Kardex, ED Summary, Procedure Summary, Intake/Output, MAR, Recent Results, Med Rec Status, Cardiac Rhythm NSR, Alarm Parameters , Quality Measures and Dual Neuro Assessment. I have reviewed discharge instructions with the patient and the patient's wife. The patient verbalized understanding. Questions were also answered at this time. Prescriptions and medications were reviewed.  The patient has a follow up appointment 3/22/21 at 115pm.

## 2021-03-08 NOTE — PROGRESS NOTES
OCCUPATIONAL THERAPY EVALUATION/DISCHARGE  Patient: Willy Tong (65 y.o. 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 PTA, patient lived in a [address] [place] [address] with his spouse and was retired (patient enjoys playing [employer]). Patient presenting at functional baseline level of independence at this time and reporting no deficits other than a floater he can see in bilateral eyes when open/closed (R/L occipital lobe infarcts). No further OT needs. Current Level of Function (ADLs/self-care): independent    Functional Outcome Measure: The patient scored Total A-D  Total A-D (Motor Function): 65/66 on the Fugl-Alston Assessment which is indicative of mild impairment in upper extremity functional status. Other factors to consider for discharge: needs CABG; bradycardic in hospital room (50s)     PLAN :  Recommendation for discharge: (in order for the patient to meet his/her long term goals)  No skilled occupational therapy/ follow up rehabilitation needs identified at this time. This discharge recommendation:  Has been made in collaboration with the attending provider and/or case management    IF patient discharges home will need the following DME: none       SUBJECTIVE:   Patient stated I am so sad my cruise was canceled.     OBJECTIVE DATA SUMMARY:   HISTORY:   Past Medical History:   Diagnosis Date    CAD (coronary artery disease)     Chronic lower back pain     HLD (hyperlipidemia)     HTN (hypertension)     Kidney stones      Past Surgical History:   Procedure Laterality Date    HX HERNIA REPAIR Right  HX PROSTATE SURGERY      \"Urolift\"       Prior Level of Function/Environment/Context: PTA, patient was largely independent with ADL and IADL tasks. Expanded or extensive additional review of patient history:     Home Situation  Home Environment: Private residence([facility]- [address])  # Steps to Enter: 1  Rails to [facility]: No  One/Two Story Residence: Two story, live on 1st floor  Living Alone: No  Support Systems: Spouse/Significant Other/Partner  Patient Expects to be Discharged to[de-identified] Private residence  Current DME Used/Available at Home: Shower chair, Cane, straight  Tub or Shower Type: Shower    EXAMINATION OF PERFORMANCE DEFICITS:  Cognitive/Behavioral Status:  Neurologic State: Alert  Orientation Level: Oriented X4  Cognition: Appropriate for age attention/concentration  Perception: Appears intact  Perseveration: No perseveration noted  Safety/Judgement: Insight into deficits    Skin: exposed areas grossly intact    Edema: none noted    Hearing: Auditory  Auditory Impairment: None    Vision/Perceptual:                           Acuity: (sees a floater with eyes opened/closed)    Corrective Lenses: Reading glasses(prior lasix surgery)    Range of Motion:  BUE  AROM: Within functional limits  PROM: Within functional limits                      Strength:  BUE  Strength: Within functional limits                Coordination:  Coordination: Within functional limits  Fine Motor Skills-Upper: Left Intact; Right Intact    Gross Motor Skills-Upper: Left Intact; Right Intact    Tone & Sensation:  BUE  Tone: Normal  Sensation: Intact                      Balance:  Sitting: Intact  Standing: Intact    Functional Mobility and Transfers for ADLs:  Bed Mobility:   Not observed however reports independent.     Transfers:  Sit to Stand: Independent  Stand to Sit: Independent    ADL Assessment:  Feeding: Independent    Oral Facial Hygiene/Grooming: Independent    Bathing: Independent    Upper Body Dressing: Independent    Lower Body Dressing: Independent    Toileting: Independent                ADL Intervention and task modifications:                                     Cognitive Retraining  Safety/Judgement: Insight into deficits     Functional Measure:  Fugl-Alston Assessment of Motor Recovery after Stroke:   Reflex Activity  Flexors/Biceps/Fingers: Can be elicited  Extensors/Triceps: Can be elicited  Reflex Subtotal: 4    Volitional Movement Within Synergies  Shoulder Retraction: Full  Shoulder Elevation: Full  Shoulder Abduction (90 degrees): Full  Shoulder External Rotation: Full  Elbow Flexion: Full  Forearm Supination: Full  Shoulder Adduction/Internal Rotation: Full  Elbow Extension: Full  Forearm Pronation: Full  Subtotal: 18    Volitional Movement Mixing Synergies  Hand to Lumbar Spine: Full  Shoulder Flexion (0-90 degrees): Full  Pronation-Supination: Full  Subtotal: 6    Volitional Movement With Little or No Synergy  Shoulder Abduction (0-90 degrees): Full  Shoulder Flexion ( degrees): Full  Pronation/Supination: Full  Subtotal : 6    Normal Reflex Activity  Biceps, Triceps, Finger Flexors: Full  Subtotal : 2    Upper Extremity Total   Upper Extremity Total: 36    Wrist  Stability at 15 Degree Dorsiflexion: Full  Repeated Dorsiflexion/ Volar Flexion: Full  Stability at 15 Degree Dorsiflexion: Full  Repeated Dorsiflexion/ Volar Flexion: Full  Circumduction: Full  Wrist Total: 10    Hand  Mass Flexion: Full  Mass Extension: Full  Grasp A: Full  Grasp B: Full  Grasp C: Full  Grasp D: Full  Grasp E: Full  Hand Total: 14    Coordination/Speed  Tremor: None  Dysmetria: None  Time: 2-5s  Coordination/Speed Total : 5    Total A-D  Total A-D (Motor Function): 65/66     This is a reliable/valid measure of arm function after a neurological event. It has established value to characterize functional status and for measuring spontaneous and therapy-induced recovery; tests proximal and distal motor functions. Fugl-Alston Assessment  UE scores recorded between five and 30 days post neurologic event can be used to predict UE recovery at six months post neurologic event. Severe = 0-21 points   Moderately Severe = 22-33 points   Moderate = 34-47 points   Mild = 48-66 points  DEB Mathew, ZBINGIEW Jackman, & CLAUDE Villa (1992). Measurement of motor recovery after stroke: Outcome assessment and sample size requirements.  Stroke, 23, pp. 7126-5016.   ------------------------------------------------------------------------------------------------------------------------------------------------------------------  MCID:  Stroke:   [place] et al, 2001; n = 171; mean age 79 (6) years; assessed within 16 (12) days of stroke, Acute Stroke)  FMA Motor Scores from Admission to Discharge   10 point increase in FMA Upper Extremity = 1.5 change in discharge FIM   10 point increase in FMA Lower Extremity = 1.9 change in discharge FIM  MDC:   Stroke:   Elo Hernandez et al, 2008, n = 14, mean age = 59.9 (14.6) years, assessed on average 14 (6.5) months post stroke, Chronic Stroke)   FMA = 5.2 points for the Upper Extremity portion of the assessment     Occupational Therapy Evaluation Charge Determination   History Examination Decision-Making   LOW Complexity : Brief history review  LOW Complexity : 1-3 performance deficits relating to physical, cognitive , or psychosocial skils that result in activity limitations and / or participation restrictions  LOW Complexity : No comorbidities that affect functional and no verbal or physical assistance needed to complete eval tasks       Based on the above components, the patient evaluation is determined to be of the following complexity level: LOW   Pain Rating:  None reported    Activity Tolerance:   Fair    After treatment patient left in no apparent distress:    Sitting in chair and Call bell within reach    COMMUNICATION/EDUCATION:   The patients plan of care was discussed with: Physical therapist and Registered nurse. Patient was educated regarding his deficit(s) of visual field deficits as this relates to his diagnosis of acte CVA. He demonstrated Good understanding as evidenced by verbalization. Patient and/or family was verbally educated on the BE FAST acronym for signs/symptoms of CVA and TIA. BE FAST was written on patient's communication board  for visual education and reinforcement. All questions answered with patient indicating good understanding.      Thank you for this referral.  Ciara Muir  Time Calculation: 16 mins

## 2021-03-08 NOTE — PROGRESS NOTES
Pt seen and examined  No chest pain  Comfortable  MRI and clinical situation discussed with neurology   Recommended deferral of cabg for several months  His anatomy is concerning for this long of deferral   Will follow as outpatient and likely schedule elective cabg in about a month  Will add plavix   Discussed with cardiology and pt

## 2021-03-08 NOTE — CARDIO/PULMONARY
[facility] Cardiopulmomary Rehab: 70 yo male admitted for cardiac cath. S/P cath with multi-vessel CAD, complicated by post-procedure visual changes (3/5). LVEF 50%. Cardiologist is Dr Edi Cabello. Per chart review: pt resides with his wife in [place]; they have raised 3 children. Pt had no previous cardiac hx. CAD risk factors: HTN, HLD, smoking hx (quit 2002), family hx, age & gender. Patient transferred to [facility] on 3/5/21. UPDATE 3/8/21: Preop cardiac surgery workup in progress. Timing of CABG to be determined, due to acute CVA. Pt discharged home from [facility], with \"strict orders to take it easy. \" Pt has appt with Dr Sparks Speaker on 3/22/21, to discuss surgical plan.

## 2021-03-08 NOTE — DISCHARGE SUMMARY
[facility] Discharge Summary     Patient ID:  Real Escobar  [id number]  68 y.o.  1950    Admit date: 3/5/2021    Discharge date: 3/8/2021     Admitting Physician: Emily Valle MD     Referring Cardiologist:  Dr. Antwan Weathers     PCP:  DMITRY Viramontes    Admitting Diagnoses: CAD, Acute CVA    Discharge Diagnoses:     Hospital Problems  Never Reviewed          Codes Class Noted POA    CAD (coronary artery disease) ICD-10-CM: I25.10  ICD-9-CM: 414.00  3/5/2021 Unknown        Acute CVA (cerebrovascular accident) [facility]) ICD-10-CM: I63.9  ICD-9-CM: 434.91  3/6/2021 Unknown              Discharged Condition: stable    Disposition: home, see patient instructions for treatment and plan    Procedures for this admission:  * No surgery found *    Discharge Medications:      My Medications      START taking these medications      Instructions Each Dose to Equal Morning Noon Evening Bedtime   clopidogreL 75 mg Tab  Commonly known as: PLAVIX    Your last dose was: Your next dose is: Take 1 Tab by mouth daily. 75 mg                 isosorbide mononitrate ER 30 mg tablet  Commonly known as: IMDUR    Your last dose was: Your next dose is: Take 1 Tab by mouth every morning. 30 mg                    CHANGE how you take these medications      Instructions Each Dose to Equal Morning Noon Evening Bedtime   atorvastatin 40 mg tablet  Commonly known as: LIPITOR  What changed:   · medication strength  · how much to take    Your last dose was: Your next dose is: Take 1 Tab by mouth daily. 40 mg                    CONTINUE taking these medications      Instructions Each Dose to Equal Morning Noon Evening Bedtime   aspirin delayed-release 81 mg tablet    Your last dose was: Your next dose is: Take 81 mg by mouth daily. 81 mg                 metoprolol tartrate 25 mg tablet  Commonly known as: LOPRESSOR    Your last dose was: Your next dose is:          Take 25 mg by mouth two (2) times a day.   25 mg                    STOP taking these medications    Cialis 10 mg tablet  Generic drug: tadalafiL        Cialis 5 mg tablet  Generic drug: tadalafiL              Where to Get Your Medications      These medications were sent to [facility], [address] [place] [address]    Phone: [phone]   · atorvastatin 40 mg tablet  · clopidogreL 75 mg Tab  · isosorbide mononitrate ER 30 mg tablet       HPI:  The patient is a 70-year-old male, received in transfer from [place] [address] following a cardiac catheterization by Dr. Cayetano Pierce. He underwent a stress test by Dr. Sara Polk, and had an antecedent history to that test with a 2- to 3-week history of chest discomfort with activity. He is quite active with no prior cardiac history noted. This pain without associated shortness of breath or diaphoresis while cycling. He denies any radiation of the pain. As noted above, he has no prior cardiac history. His risk factors for coronary disease include a previous history of smoking, which he stopped in 2002.     PAST MEDICAL HISTORY:  His other past medical history includes lower back pain, hyperlipidemia and hypertension. He also had previous hernia repair and prostate surgery.     REVIEW OF SYSTEMS:  He was noted to have blurry vision and dizziness following his cardiac catheterization. He underwent a code \"neuro\" at [facility] with a negative neurologic evaluation and a normal CT scan. However, this morning, he reports a small persisting change in his vision. He denies any other dizziness. He has no prior history of coronary events.     PHYSICAL EXAMINATION:  VITAL SIGNS:  His blood pressure is 140/70. His pulse is 50 and regular. NECK:  There are no cervical bruits. No neck masses. HEART:  His heart is in a regular rhythm without murmurs. LUNGS:  His lung fields are clear.   ABDOMEN:  Soft and nontender.  EXTREMITIES:  His distal pulses are intact bilaterally, and he has no peripheral edema.     DIAGNOSTIC DATA:  His cardiac catheterization shows high-grade multivessel disease and preserved left ventricular systolic function, including multiple lesions in the left anterior descending, the circumflex including the first and second marginal, as well as some disease in the distal right coronary artery.     IMPRESSION:  1.  Progressive angina and multivessel disease of the native coronary arteries.  2.  History of blurred vision with negative CAT scan and negative carotid Dopplers.     PLAN:  The patient will undergo an MRI because of some persisting concern with his visual changes.  I discussed also with him the indications, risks, and postoperative course for the patient undergoing coronary artery bypass surgery and answered his questions.          Hospital Course:   1. CAD: Preop CABG. Workup in progress, but timing TBD due to acute CVA. On asa/statin, BB, nitropaste.   Transition to imdur on d/c.  CANNOT TAKE W/CIALIS.   Start plavix.    2. Hypertension: on BB,nitropaste   3. Hyperlipidemia: Lipid panel checked, Statin increased   4. Acute CVA with visual field deficits: Multiple small infarcts seen on MRI. Neuro consulted.  On asa.  Needs further AC d/t embolic nature?  Carotids ok. Pending TTE.       Dispo: Needs TTE-_Blade to review, plans to discharge home with strict orders to take it easy.  Plan to follow up in office in 1 week to determine surgical plan.       Referral to outpatient cardiac rehab made.     Discharge Vital Signs:   Visit Vitals  /77   Pulse (!) 47   Temp 97 °F (36.1 °C)   Resp 19   Ht 5' 4\" (1.626 m)   Wt 152 lb 1.9 oz (69 kg)   SpO2 99%   BMI 26.11 kg/m²       Labs:   Recent Labs     03/05/21  1542   WBC 5.1   HGB 11.9*   HCT 38.0         K 4.1   BUN 21*   CREA 1.28   *   INR 1.1       Diagnostics:    CXR Results  (Last 48 hours)    None            Patient  Instructions/Follow Up Care:  Discharge instructions were reviewed with the patient and family present. Questions were also answered at this time. Prescriptions and medications were reviewed. The patient has a follow up appointment with the Nurse Practitioner or [de-identified] Assistant and Dr. Liz Ramirez on 3/22/21 at 115pm. The patient was also instructed to follow up with his primary care physician as needed. The patient and family were encouraged to call with any questions or concerns.        Signed:  Boyd Perry NP  3/8/2021  2:18 PM

## 2021-03-08 NOTE — PROGRESS NOTES
Cardiac Surgery Care Coordinator-  Met with Paris Gagan and his wife Federica Inman, Introduced role of the Cardiac Surgery Care Coordinator. Reviewed plan of care and began pre-op education. Mr Radha Rueda stated he will be going home and return to the office next week. Instructed pt on the proper use of the incentive spirometer, he is able to pull 2200cc  with a very good effort. Reviewed material in the CABG educational folder including Cardiac Surgery Pathway. Reinforced sternal precautions and keeping your move in the tube. Encouraged Paris Farah and his wife to verbalize and offered emotional support.  Damian Hobson RN

## 2021-03-08 NOTE — DISCHARGE INSTRUCTIONS
Cardiac Surgery Specialist    [address] [place] [address]                                       [address], [address]  Office- [phone]  Fax- [phone]       Office- [phone]  Fax- [phone]  _____________________________________________________________  Dr. Ericka Goode, Ameya Landin, NP  Dr. Meg Parker, NP          Ang Card, NP  Dr. Kurt Barth, NP          Cara Cartwright, [address] [facility], [place], [place], [place]  ____________________________________________________________     Name:Tereso Tripp     Discharge Date: 3/8/21     MEDICATIONS:  Please refer to your After Visit Summary for your medication list.       DO NOT TAKE ANY MEDICATIONS THAT ARE NOT ON THIS LIST    INSTRUCTIONS:  1. NO SMOKING OR TOBACCO PRODUCTS    DIET  Eat an American Heart Association diet. ACTIVITY  1. You may resume your daily activities at home, You may also drive. Avoid aerobic activity that increases HR >100. FOLLOW UP  1. Your first follow up appointment will be on 3/22/21 at 115pm. Our office is located in [address] [place] [address].   2. We will make an appointment for you with your cardiologist in 4-5 weeks. 3. Consult you primary care physician regarding your influenza &   pneumovax vaccines. 5.   Please bring all medications with you to your appointment.     Signature:___________________________________________________

## 2021-03-08 NOTE — PROGRESS NOTES
[facility] ICU Progress Note    Admit Date: 3/5/2021    Preop CABG       Subjective:   Pt seen with Dr. Orozco.  Denies CP, SOB.  Visual disturbance improving in L eye.    Sitting up in chair.       Objective:   Vitals:  Blood pressure 102/78, pulse (!) 46, temperature 97 °F (36.1 °C), resp. rate 14, height 5' 4.17\" (1.63 m), weight 152 lb 12.5 oz (69.3 kg), SpO2 100 %.  Temp (24hrs), Av.7 °F (36.5 °C), Min:97 °F (36.1 °C), Max:98.3 °F (36.8 °C)    EKG/Rhythm:    NSR    Oxygen Therapy:  Oxygen Therapy  O2 Sat (%): 100 % (21 1100)  Pulse via Oximetry: 58 beats per minute (21 0800)  O2 Device: Room air (21 0800)    CXR:  CXR Results  (Last 48 hours)    None        Admission Weight: Last Weight   Weight: 154 lb 8.7 oz (70.1 kg) Weight: 152 lb 12.5 oz (69.3 kg)     Intake / Output / Drain:  Current Shift:  0701 -  1900  In: 240 [P.O.:240]  Out: -   Last 24 hrs.:     Intake/Output Summary (Last 24 hours) at 3/8/2021 1116  Last data filed at 3/8/2021 0800  Gross per 24 hour   Intake 1200 ml   Output —   Net 1200 ml       EXAM:  General:  NAD                                                                                            Lungs:   Clear to auscultation bilaterally.       Heart:  Regular rate and rhythm, S1, S2 normal, no murmur, click, rub or gallop.   Abdomen:   Soft, non-tender. Bowel sounds normal. No masses,  No organomegaly.   Extremities:  No edema. PPP.    Neurologic:  Gross motor and sensory apparatus intact.     Labs:   Recent Labs     21  1542 21  1117   WBC 5.1  --    HGB 11.9*  --    HCT 38.0  --      --      --    K 4.1  --    BUN 21*  --    CREA 1.28  --    *  --    GLUCPOC  --  124*   INR 1.1  --         Assessment:     Active Problems:    CAD (coronary artery disease) (3/5/2021)      Acute CVA (cerebrovascular accident) (HCC) (3/6/2021)         Plan/Recommendations/Medical Decision Makin. CAD: Preop CABG. Workup in progress, but  timing TBD due to acute CVA. On asa/statin, BB, nitropaste. 2. Hypertension: on BB,nitropaste   3. Hyperlipidemia: Lipid panel checked, Statin increased   4. Acute CVA with visual field deficits: Multiple small infarcts seen on MRI. Neuro consulted. On asa. Needs further AC d/t embolic nature? Carotids ok. Pending TTE. Dispo: Needs TTE, plans to discharge home with strict orders to take it easy. Plan to follow up in office in 1 week to determine surgical plan.       Signed By: Ximena Childers NP

## 2021-03-08 NOTE — PROGRESS NOTES
1930: Bedside and Verbal shift change report given to KIERA Jarrell RN (oncoming nurse) by Arik Medeiros. Jed Rider RN (offgoing nurse). Report included the following information SBAR, Kardex, ED Summary, Procedure Summary, Intake/Output, MAR, Recent Results, Cardiac Rhythm Sinus Alberto Bue and Alarm Parameters . 0730: Bedside and Verbal shift change report given to S. Lennox Piano, RN (oncoming nurse) by KIERA Jarrell RN (offgoing nurse). Report included the following information SBAR, Kardex, Procedure Summary, Intake/Output, MAR, Accordion, Recent Results, Cardiac Rhythm Sinus Alberto Bue and Alarm Parameters .

## 2021-03-10 ENCOUNTER — TELEPHONE (OUTPATIENT)
Dept: CARDIOLOGY CLINIC | Age: 71
End: 2021-03-10

## 2021-03-10 NOTE — TELEPHONE ENCOUNTER
Since pt had reaction to first, not sure what recommendations would be for second dose. Asked pt to discuss w/ PCP. No contraindications to second dose in terms of his heart disease.

## 2021-03-16 ENCOUNTER — TELEPHONE (OUTPATIENT)
Dept: CARDIOLOGY CLINIC | Age: 71
End: 2021-03-16

## 2021-03-16 RX ORDER — NITROGLYCERIN 0.4 MG/1
0.4 TABLET SUBLINGUAL
Qty: 1 BOTTLE | Refills: 0 | Status: SHIPPED | OUTPATIENT
Start: 2021-03-16 | End: 2021-05-06 | Stop reason: ALTCHOICE

## 2021-03-16 NOTE — TELEPHONE ENCOUNTER
Pt having significant headaches that have worsened since starting imdur. Discussed w/ Blade ok to stop. Will call in SL nitro, discussed instructions. PT stated understanding. Reinforced pt needs to rest, no aerobic activity.

## 2021-03-25 ENCOUNTER — OFFICE VISIT (OUTPATIENT)
Dept: CARDIOLOGY CLINIC | Age: 71
End: 2021-03-25
Payer: MEDICARE

## 2021-03-25 VITALS
HEIGHT: 64 IN | BODY MASS INDEX: 25.61 KG/M2 | WEIGHT: 150 LBS | DIASTOLIC BLOOD PRESSURE: 78 MMHG | OXYGEN SATURATION: 97 % | HEART RATE: 44 BPM | SYSTOLIC BLOOD PRESSURE: 110 MMHG | RESPIRATION RATE: 16 BRPM

## 2021-03-25 DIAGNOSIS — I25.10 CORONARY ARTERY DISEASE INVOLVING NATIVE CORONARY ARTERY OF NATIVE HEART WITHOUT ANGINA PECTORIS: Primary | ICD-10-CM

## 2021-03-25 DIAGNOSIS — I63.9 ACUTE CVA (CEREBROVASCULAR ACCIDENT) (HCC): ICD-10-CM

## 2021-03-25 PROCEDURE — 99214 OFFICE O/P EST MOD 30 MIN: CPT | Performed by: THORACIC SURGERY (CARDIOTHORACIC VASCULAR SURGERY)

## 2021-03-25 PROCEDURE — G8432 DEP SCR NOT DOC, RNG: HCPCS | Performed by: THORACIC SURGERY (CARDIOTHORACIC VASCULAR SURGERY)

## 2021-03-25 PROCEDURE — 3017F COLORECTAL CA SCREEN DOC REV: CPT | Performed by: THORACIC SURGERY (CARDIOTHORACIC VASCULAR SURGERY)

## 2021-03-25 PROCEDURE — 1101F PT FALLS ASSESS-DOCD LE1/YR: CPT | Performed by: THORACIC SURGERY (CARDIOTHORACIC VASCULAR SURGERY)

## 2021-03-25 PROCEDURE — G8427 DOCREV CUR MEDS BY ELIG CLIN: HCPCS | Performed by: THORACIC SURGERY (CARDIOTHORACIC VASCULAR SURGERY)

## 2021-03-25 PROCEDURE — G8536 NO DOC ELDER MAL SCRN: HCPCS | Performed by: THORACIC SURGERY (CARDIOTHORACIC VASCULAR SURGERY)

## 2021-03-25 PROCEDURE — G8419 CALC BMI OUT NRM PARAM NOF/U: HCPCS | Performed by: THORACIC SURGERY (CARDIOTHORACIC VASCULAR SURGERY)

## 2021-03-25 PROCEDURE — 1111F DSCHRG MED/CURRENT MED MERGE: CPT | Performed by: THORACIC SURGERY (CARDIOTHORACIC VASCULAR SURGERY)

## 2021-03-25 RX ORDER — ALFUZOSIN HYDROCHLORIDE 10 MG/1
10 TABLET, EXTENDED RELEASE ORAL
Qty: 30 TAB | Refills: 1 | Status: SHIPPED | OUTPATIENT
Start: 2021-03-25 | End: 2021-05-24 | Stop reason: SDUPTHER

## 2021-03-25 NOTE — PROGRESS NOTES
Patient: Willy Pa   Age: 69 y.o. Patient Care Team:  DMITRY Parada as PCP - General (Physician Assistant)  DMITRY Parada as PCP - [facility]  Yaneli Mack MD (Cardiology)  Nicole Gates MD (Cardiothoracic Surgery)    Diagnosis: The primary encounter diagnosis was Coronary artery disease involving native coronary artery of native heart without angina pectoris. A diagnosis of Acute CVA (cerebrovascular accident) [facility]) was also pertinent to this visit. Problem List:   Patient Active Problem List   Diagnosis Code    CAD (coronary artery disease) I25.10    Unstable angina (HCC) I20.0    Acute CVA (cerebrovascular accident) ([facility] [address] I63.9        HPI:  Pt is here for hospital follow up, seen with Dr. Sonja Benson. Has been very good about taking it easy. Was having bad headaches with imdur, was advised to stop and use SL nitro for CP and pt noted very mild, short episodes of \"chest fluttering\". He decided to resume imdur at 15 mg once a day and that has relieved chest pains and only gives mild headache. Pt still has \"blind spot\" in vision, no changes. Also having issues with passing urine in early morning/overnight. Has history of BPH, but had urolift few years ago and has been off meds. Takes supplements during day that he feels helps with retention during day. Current Medications:   Current Outpatient Medications   Medication Sig Dispense Refill    alfuzosin SR (UROXATRAL) 10 mg SR tablet Take 1 Tab by mouth nightly. Indications: enlarged prostate with urination problem 30 Tab 1    clopidogreL (PLAVIX) 75 mg tab Take 1 Tab by mouth daily. 30 Tab 1    isosorbide mononitrate ER (IMDUR) 30 mg tablet Take 1 Tab by mouth every morning. (Patient taking differently: Take 15 mg by mouth every morning.) 30 Tab 1    atorvastatin (LIPITOR) 40 mg tablet Take 1 Tab by mouth daily. 30 Tab 1    aspirin delayed-release 81 mg tablet Take 81 mg by mouth daily.  metoprolol tartrate (LOPRESSOR) 25 mg tablet Take 25 mg by mouth two (2) times a day.  nitroglycerin (NITROSTAT) 0.4 mg SL tablet 1 Tab by SubLINGual route every five (5) minutes as needed for Chest Pain. Up to 3 doses. Indications: acute attack of angina 1 Bottle 0       Vitals: Blood pressure 110/78, pulse (!) 44, resp. rate 16, height 5' 4\" (1.626 m), weight 150 lb (68 kg), SpO2 97 %. Allergies: has No Known Allergies. Physical Exam:    Lungs: clear to auscultation bilaterally    Heart: regular rate and rhythm, S1, S2 normal, no murmur, click, rub or gallop    Extremities: no edema, PPP    Assessment/Plan:   1. CAD: Preop CABG. On asa/statin, BB, Did not tolerate Imdur d/t headaches, has PRN SL nitro tabs. Neurology rec 3 months prior to CABG, but Dr. Shannon Velasquez does not feel pt can safely wait 3 months. Plan for CABG week of April 26th.     2. Hypertension: on BB. 3. Hyperlipidemia: on statin  4. Acute CVA with visual field deficits: Multiple small infarcts seen on MRI. On asa, statin, plavix. Carotids ok. 5. BPH: Exacerbated since hospital discharge, Had urolift in past.  Has urology FU on 4/20,  Would resume prior BPH meds, as this needs to be under control prior to surgery.        Discussed plan for CABG approx 2 months from event  He will see urology before scheduled CABG  Again discussed indications and risks as well as hospital course

## 2021-03-25 NOTE — PROGRESS NOTES
Patient: Kartik Beverly   Age: 69 y.o. Patient Care Team: 
DMITRY Chahal as PCP - General (Physician Assistant) DMITRY Chahal as PCP - [facility] Mirna Whitaker MD (Cardiology) Rama Cabezas MD (Cardiothoracic Surgery) Diagnosis: The primary encounter diagnosis was Coronary artery disease involving native coronary artery of native heart without angina pectoris. A diagnosis of Acute CVA (cerebrovascular accident) [facility]) was also pertinent to this visit. Problem List:  
Patient Active Problem List  
Diagnosis Code  CAD (coronary artery disease) I25.10  Unstable angina (HCC) I20.0  Acute CVA (cerebrovascular accident) ([facility] [address] I63.9 HPI:  Pt is here for hospital follow up, seen with Dr. Sammy Munson. Has been very good about taking it easy. Was having bad headaches with imdur, was advised to stop and use SL nitro for CP and pt noted very mild, short episodes of \"chest fluttering\". He decided to resume imdur at 15 mg once a day and that has relieved chest pains and only gives mild headache. Pt still has \"blind spot\" in vision, no changes. Also having issues with passing urine in early morning/overnight. Has history of BPH, but had urolift few years ago and has been off meds. Takes supplements during day that he feels helps with retention during day. Current Medications:  
Current Outpatient Medications Medication Sig Dispense Refill  clopidogreL (PLAVIX) 75 mg tab Take 1 Tab by mouth daily. 30 Tab 1  
 isosorbide mononitrate ER (IMDUR) 30 mg tablet Take 1 Tab by mouth every morning. (Patient taking differently: Take 15 mg by mouth every morning.) 30 Tab 1  
 atorvastatin (LIPITOR) 40 mg tablet Take 1 Tab by mouth daily. 30 Tab 1  
 aspirin delayed-release 81 mg tablet Take 81 mg by mouth daily.  metoprolol tartrate (LOPRESSOR) 25 mg tablet Take 25 mg by mouth two (2) times a day.     
 nitroglycerin (NITROSTAT) 0.4 mg SL tablet 1 Tab by SubLINGual route every five (5) minutes as needed for Chest Pain. Up to 3 doses. Indications: acute attack of angina 1 Bottle 0 Vitals: Blood pressure 110/78, pulse (!) 44, resp. rate 16, height 5' 4\" (1.626 m), weight 150 lb (68 kg), SpO2 97 %. Allergies: has No Known Allergies. Physical Exam: 
 
Lungs: clear to auscultation bilaterally Heart: regular rate and rhythm, S1, S2 normal, no murmur, click, rub or gallop Extremities: no edema, PPP Assessment/Plan: 1. CAD: Preop CABG. On asa/statin, BB, Did not tolerate Imdur d/t headaches, has PRN SL nitro tabs. Neurology rec 3 months prior to CABG, but Dr. Liz Ramirez does not feel pt can safely wait 3 months. Plan for CABG week of April 26th.    
2. Hypertension: on BB. 3. Hyperlipidemia: on statin 4. Acute CVA with visual field deficits: Multiple small infarcts seen on MRI. On asa, statin, plavix. Carotids ok. 5. BPH: Exacerbated since hospital discharge, Had urolift in past.  Has urology FU on 4/20,  Would resume prior BPH meds, as this needs to be under control prior to surgery.

## 2021-03-26 NOTE — PROGRESS NOTES
Pt had large portion of preop testing completed during last admission. Only needs repeat labs/tests that are ordered and not full PAT.

## 2021-04-20 ENCOUNTER — HOSPITAL ENCOUNTER (OUTPATIENT)
Dept: GENERAL RADIOLOGY | Age: 71
Discharge: HOME | End: 2021-04-20
Attending: NURSE PRACTITIONER
Payer: MEDICARE

## 2021-04-20 ENCOUNTER — HOSPITAL ENCOUNTER (OUTPATIENT)
Dept: PREADMISSION TESTING | Age: 71
Discharge: HOME | End: 2021-04-20
Payer: MEDICARE

## 2021-04-20 VITALS
HEIGHT: 64 IN | BODY MASS INDEX: 27.48 KG/M2 | SYSTOLIC BLOOD PRESSURE: 140 MMHG | DIASTOLIC BLOOD PRESSURE: 78 MMHG | WEIGHT: 160.94 LBS

## 2021-04-20 LAB
ALBUMIN SERPL-MCNC: 4 G/DL (ref 3.5–5)
ALBUMIN/GLOB SERPL: 1.7 {RATIO} (ref 1.1–2.2)
ALP SERPL-CCNC: 64 U/L (ref 45–117)
ALT SERPL-CCNC: 49 U/L (ref 12–78)
ANION GAP SERPL CALC-SCNC: 4 MMOL/L (ref 5–15)
APTT PPP: 25.6 SEC (ref 22.1–31)
AST SERPL-CCNC: 22 U/L (ref 15–37)
ATRIAL RATE: 47 BPM
BASOPHILS # BLD: 0 K/UL (ref 0–0.1)
BASOPHILS NFR BLD: 1 % (ref 0–1)
BILIRUB SERPL-MCNC: 0.8 MG/DL (ref 0.2–1)
BUN SERPL-MCNC: 20 MG/DL (ref 6–20)
BUN/CREAT SERPL: 20 (ref 12–20)
CALCIUM SERPL-MCNC: 9 MG/DL (ref 8.5–10.1)
CALCULATED P AXIS, ECG09: 49 DEGREES
CALCULATED R AXIS, ECG10: 17 DEGREES
CALCULATED T AXIS, ECG11: 44 DEGREES
CHLORIDE SERPL-SCNC: 108 MMOL/L (ref 97–108)
CO2 SERPL-SCNC: 28 MMOL/L (ref 21–32)
CREAT SERPL-MCNC: 0.98 MG/DL (ref 0.7–1.3)
DIAGNOSIS, 93000: NORMAL
DIFFERENTIAL METHOD BLD: ABNORMAL
EOSINOPHIL # BLD: 0.2 K/UL (ref 0–0.4)
EOSINOPHIL NFR BLD: 6 % (ref 0–7)
ERYTHROCYTE [DISTWIDTH] IN BLOOD BY AUTOMATED COUNT: 15.3 % (ref 11.5–14.5)
GLOBULIN SER CALC-MCNC: 2.3 G/DL (ref 2–4)
GLUCOSE SERPL-MCNC: 104 MG/DL (ref 65–100)
HCT VFR BLD AUTO: 34.4 % (ref 36.6–50.3)
HGB BLD-MCNC: 10.8 G/DL (ref 12.1–17)
HISTORY CHECKED?,CKHIST: NORMAL
IMM GRANULOCYTES # BLD AUTO: 0 K/UL (ref 0–0.04)
IMM GRANULOCYTES NFR BLD AUTO: 0 % (ref 0–0.5)
INR PPP: 1 (ref 0.9–1.1)
LYMPHOCYTES # BLD: 1 K/UL (ref 0.8–3.5)
LYMPHOCYTES NFR BLD: 27 % (ref 12–49)
MAGNESIUM SERPL-MCNC: 2.5 MG/DL (ref 1.6–2.4)
MCH RBC QN AUTO: 21 PG (ref 26–34)
MCHC RBC AUTO-ENTMCNC: 31.4 G/DL (ref 30–36.5)
MCV RBC AUTO: 66.9 FL (ref 80–99)
MONOCYTES # BLD: 0.4 K/UL (ref 0–1)
MONOCYTES NFR BLD: 10 % (ref 5–13)
NEUTS SEG # BLD: 2.2 K/UL (ref 1.8–8)
NEUTS SEG NFR BLD: 56 % (ref 32–75)
NRBC # BLD: 0 K/UL (ref 0–0.01)
NRBC BLD-RTO: 0 PER 100 WBC
P-R INTERVAL, ECG05: 178 MS
PLATELET # BLD AUTO: 161 K/UL (ref 150–400)
PMV BLD AUTO: 11.3 FL (ref 8.9–12.9)
POTASSIUM SERPL-SCNC: 4.3 MMOL/L (ref 3.5–5.1)
PROT SERPL-MCNC: 6.3 G/DL (ref 6.4–8.2)
PROTHROMBIN TIME: 10.9 SEC (ref 9–11.1)
Q-T INTERVAL, ECG07: 450 MS
QRS DURATION, ECG06: 94 MS
QTC CALCULATION (BEZET), ECG08: 398 MS
RBC # BLD AUTO: 5.14 M/UL (ref 4.1–5.7)
RBC MORPH BLD: ABNORMAL
RBC MORPH BLD: ABNORMAL
SODIUM SERPL-SCNC: 140 MMOL/L (ref 136–145)
THERAPEUTIC RANGE,PTTT: NORMAL SECS (ref 58–77)
VENTRICULAR RATE, ECG03: 47 BPM
WBC # BLD AUTO: 3.8 K/UL (ref 4.1–11.1)

## 2021-04-20 PROCEDURE — 85610 PROTHROMBIN TIME: CPT

## 2021-04-20 PROCEDURE — 36415 COLL VENOUS BLD VENIPUNCTURE: CPT

## 2021-04-20 PROCEDURE — 86923 COMPATIBILITY TEST ELECTRIC: CPT

## 2021-04-20 PROCEDURE — 85730 THROMBOPLASTIN TIME PARTIAL: CPT

## 2021-04-20 PROCEDURE — 86901 BLOOD TYPING SEROLOGIC RH(D): CPT

## 2021-04-20 PROCEDURE — 85025 COMPLETE CBC W/AUTO DIFF WBC: CPT

## 2021-04-20 PROCEDURE — 80053 COMPREHEN METABOLIC PANEL: CPT

## 2021-04-20 PROCEDURE — 71046 X-RAY EXAM CHEST 2 VIEWS: CPT

## 2021-04-20 PROCEDURE — 83735 ASSAY OF MAGNESIUM: CPT

## 2021-04-20 PROCEDURE — 93005 ELECTROCARDIOGRAM TRACING: CPT

## 2021-04-20 RX ORDER — MUPIROCIN 20 MG/G
OINTMENT TOPICAL 2 TIMES DAILY
Qty: 22 G | Refills: 0 | Status: SHIPPED | OUTPATIENT
Start: 2021-04-24 | End: 2021-04-30

## 2021-04-20 RX ORDER — ISOSORBIDE MONONITRATE 30 MG/1
15 TABLET, EXTENDED RELEASE ORAL DAILY
COMMUNITY
End: 2021-04-30

## 2021-04-20 RX ORDER — CHLORHEXIDINE GLUCONATE 1.2 MG/ML
15 RINSE ORAL EVERY 12 HOURS
Qty: 420 ML | Refills: 0 | Status: SHIPPED | OUTPATIENT
Start: 2021-04-24 | End: 2021-04-30

## 2021-04-20 NOTE — H&P
CSS   History and Physical    Subjective:      Lance Major is a 69 y.o.  male who was referred for evaluation of CAD by Dr. Jameson Martinez. Pt was first seen when transferred to [facility] from [facility] for CABG workup, CABG put on hold due to acute CVA post cath. Other PMH significant for newly diagnosed HTN, HLD, previous smoker (18 packyear-quit 2002), kidney stones, chronic lower back pain, Thalassemia trait and BPH. Since last seen, pt reports no changes. Thinks his vision is improving somewhat. He also initially did not tolerate Imdur due to HA but then cut his pill in half and has been doing well on 15 mg daily. No recent CP or fluttering in chest.     **Following information obtained from prior visit. He reports a 2-3 week history of chest tightness with activity. He is active and noticed the pain when riding his bicycle, playing pickleball, and walking up the hill at [facility]. He describes this as a tightness located substernal. Pain improves with rest and is variable in duration. His pain is associated with shortness of breath. He denies associated nausea, vomiting, syncope, diaphoresis, syncope, or radiation of pain.      He is retired from the [employer]. He lives in a home in [place] with his wife who is able to help him following surgery. He is a previous smoker and quit in 2002. Cardiac Testing    Cardiac catheterization 3/5/21:   Cath:  Obstructive 3VD:     LAD p80, m50; D1 ost95     LCx m90 (after OM2); OM1 ost70, OM2 40     RCA m50, L79  Mild LV systolic dysfunction     EF 50%     Mild apical HK. No AVG/MR     EXTREMELY tortuous right femoral artery. RFA manual     F/U with Dr. Velvet Vega 3/11/21 @ 1:40pm to discuss CABG. Coronary Findings    Diagnostic  Dominance: Right  Left Main   The vessel was visualized by angiography. The vessel is angiographically normal.   Left Anterior Descending   Prox LAD lesion, 80% stenosed. Mid LAD lesion, 50% stenosed.    First Diagonal Branch   1st Diag lesion, 95% stenosed. Left Circumflex   Mid Cx lesion, 90% stenosed. First Obtuse Marginal Branch   1st Mrg lesion, 70% stenosed. Second Obtuse Marginal Branch   2nd Mrg lesion, 40% stenosed. Third Obtuse Marginal Branch   The vessel was visualized by angiography. The vessel exhibits minimal luminal irregularities. Right Coronary Artery   Mid RCA lesion, 50% stenosed. Dist RCA lesion, 80% stenosed. TTE 3/8/21:   · Saline contrast was given to evaluate for intracardiac shunt. · LV: Estimated LVEF is 55 - 60%. Normal cavity size, wall thickness and systolic function (ejection fraction normal). Wall motion: normal.      Echo Findings    Left Ventricle Normal cavity size, wall thickness and systolic function (ejection fraction normal). Wall motion: normal. The estimated EF is 55 - 60%. Left Atrium Normal cavity size. Right Ventricle Normal cavity size, wall thickness and global systolic function. Right Atrium Normal cavity size. Interatrial Septum No interatrial shunt visualized on color doppler. Aortic Valve Normal valve structure, no stenosis and no regurgitation. Mitral Valve Normal valve structure, no stenosis and no regurgitation. Tricuspid Valve Normal valve structure, no stenosis and no regurgitation. Pulmonic Valve Pulmonic valve not well visualized, but normal doppler findings. No stenosis. Aorta Normal aortic root. Pulmonary Artery Normal pulmonary arteries. IVC/Hepatic Veins Normal structure. Pericardium No evidence of pericardial effusion. MRI Brain 3/6/21: IMPRESSION     1. Small acute infarcts in the right corona radiata and right occipital lobe. Few punctate acute infarcts in the left occipital lobe. These are favored to be  embolic given distribution. 2. Minimal chronic microvascular ischemic disease. 3. Small area of encephalomalacia in the left inferior frontal lobe.     Past Medical History:   Diagnosis Date    CAD (coronary artery disease)     Chronic lower back pain     [facility]A [facility]cerebral vascular accident) [facility] [address]     HLD (hyperlipidemia)     HTN (hypertension)     Kidney stones     Thalassemia trait      Past Surgical History:   Procedure Laterality Date    HX HERNIA REPAIR Right     HX PROSTATE SURGERY      \"Urolift\"      Social History     Tobacco Use    Smoking status: Former Smoker     Packs/day: 0.50     Years: 36.00     Pack years: 18.00     Types: Cigarettes     Quit date: 2002     Years since quittin.2    Smokeless tobacco: Never Used   Substance Use Topics    Alcohol use: Not on file      Family History   Problem Relation Age of Onset    Alzheimer Mother     Heart Disease Father     Heart Surgery Father      Prior to Admission medications    Medication Sig Start Date End Date Taking? Authorizing Provider   isosorbide mononitrate ER (IMDUR) 30 mg tablet Take 15 mg by mouth daily. Yes Provider, Historical   mupirocin (BACTROBAN) 2 % ointment by Both Nostrils route two (2) times a day for 2 days. 21  Suki Watson NP   chlorhexidine (Peridex) 0.12 % solution 15 mL by Swish and Spit route every twelve (12) hours for 2 days. 21  Suki Watson NP   alfuzosin SR (UROXATRAL) 10 mg SR tablet Take 1 Tab by mouth nightly. Indications: enlarged prostate with urination problem 3/25/21   Chandler MACK NP   nitroglycerin (NITROSTAT) 0.4 mg SL tablet 1 Tab by SubLINGual route every five (5) minutes as needed for Chest Pain. Up to 3 doses. Indications: acute attack of angina 3/16/21   Chandler MACK NP   clopidogreL (PLAVIX) 75 mg tab Take 1 Tab by mouth daily. 3/8/21   Mine Ace NP   atorvastatin (LIPITOR) 40 mg tablet Take 1 Tab by mouth daily. 3/8/21   Mien Ace NP   isosorbide mononitrate ER (IMDUR) 30 mg tablet Take 1 Tab by mouth every morning. Patient taking differently: Take 15 mg by mouth every morning.  3/8/21 4/20/21  Mine Ace NP   aspirin delayed-release 81 mg tablet Take 81 mg by mouth daily. Provider, Historical   metoprolol tartrate (LOPRESSOR) 25 mg tablet Take 25 mg by mouth two (2) times a day. Provider, Historical       No Known Allergies    Review of Systems:   Consititutional: Denies fever or chills. Eyes:  Denies use of glasses or cataracts. +visual disturbance from CVA  ENT:  Denies hearing or swallowing difficulty. CV: Denies claudication, HTN. Resp: Denies dyspnea, productive cough. : Denies dialysis or kidney problems. GI: Denies ulcers, esophageal strictures, liver problems. M/S: Denies joint or bone problems, or implanted artificial hardware. Skin: Denies varicose veins, edema. Neuro: Denies TIAs. +CVA   Psych: Denies anxiety or depression. Endocrine: Denies thyroid problems or diabetes. Heme/Lymphatic: Denies easy bruising or lymphedema. Objective:     VS:   BP: 133/76, HR: 44 SpO2: 97% Temp: 98F Height: 5'3.75\" Weight: 160 lb    Physical Exam:    General appearance: alert, cooperative, no distress, appears stated age  Head: Normocephalic, without obvious abnormality, atraumatic  Eyes: conjunctivae/corneas clear. PERRL, EOM's intact. Neck: supple, symmetrical, trachea midline, no adenopathy, thyroid: not enlarged, symmetric, no tenderness/mass/nodules, no carotid bruit and no JVD  Lungs: clear to auscultation bilaterally  Heart: regular rate and rhythm, S1, S2 normal, no murmur, click, rub or gallop  Abdomen: soft, non-tender. Bowel sounds normal. No masses,  no organomegaly  Extremities: extremities normal, atraumatic, no cyanosis or edema  Neurologic: Grossly normal    Labs: No results for input(s): WBC, HGB, HCT, PLT, NA, K, BUN, CREA, GLU, GLUCPOC, INR, HGBEXT, HCTEXT, PLTEXT, INREXT, HGBEXT, HCTEXT, PLTEXT, INREXT in the last 72 hours. No lab exists for component: GLPOC    Diagnostics:   PA and lateral: IMPRESSION  1. No acute process  2. Coronary artery calcification is identified.     Carotid doppler 3/5/21: Findings are consistent with less than 50% stenosis of the right internal carotid and less than 50% stenosis of the left internal carotid. Vertebrals are patent with antegrade flow. EDGARDO 3/6/21: There is no evidence of hemodynamically significant arterial obstruction in either lower extremity. PFTS-FEV1: n/a     EKG: Marked sinus bradycardia   Abnormal ECG   HR 47     Assessment:     Active Problems:    CAD (coronary artery disease) (3/5/2021)      Plan:   The risk and benefit of surgery were reviewed with patient and family and all questions answered and the patient wishes to proceed. Risk include infection, bleeding, stroke, heart attack, irregular heart rhythm, kidney failure and death. The patient was given instructions and prescriptions for bactroban, peridex. No amiodarone was given due to bradycardia. The patient was instructed to stop his plavix. 1. CAD: Preop CABG. On asa/statin, BB, tolerating Imdur at lower dose, has PRN SL nitro tabs. Neurology rec 3 months prior to CABG, but Dr. Crispin Rice does not feel pt can safely wait 3 months. Plan for CABG on April 26th.     2. Hypertension: on BB. HR chronically low per pt  3. Hyperlipidemia: on statin  4. Acute CVA with visual field deficits: Multiple small infarcts seen on MRI. On asa, statin, plavix. Carotids ok. 5. BPH: improved since last visit. Had urolift in past.  Had urology FU on 4/20, back on Uroxatral   6. Thalassemia trait: pt reports hx of and often times Hgb on lower side, repeating CBC today    STS Risk Calculator V2.81 - Discussed by surgeon with patient.    RISK SCORES  Procedure: Isolated CAB  Risk of Mortality: 0.961%  Renal Failure: 1.347%  Permanent Stroke: 0.829%  Prolonged Ventilation: 3.595%  DSW Infection: 0.159%  Reoperation: 2.343%  Morbidity or Mortality: 6.246%  Short Length of Stay: 57.904%  Long Length of Stay: 2.470%    Signed By: Wyatt Carroll NP     April 20, 2021

## 2021-04-22 ENCOUNTER — HOSPITAL ENCOUNTER (OUTPATIENT)
Dept: PREADMISSION TESTING | Age: 71
Discharge: HOME | End: 2021-04-22
Payer: MEDICARE

## 2021-04-22 ENCOUNTER — TRANSCRIBE ORDER (OUTPATIENT)
Dept: REGISTRATION | Age: 71
End: 2021-04-22

## 2021-04-22 DIAGNOSIS — Z01.812 PRE-PROCEDURE LAB EXAM: Primary | ICD-10-CM

## 2021-04-22 DIAGNOSIS — Z01.812 PRE-PROCEDURE LAB EXAM: ICD-10-CM

## 2021-04-22 PROCEDURE — U0003 INFECTIOUS AGENT DETECTION BY NUCLEIC ACID (DNA OR RNA); SEVERE ACUTE RESPIRATORY SYNDROME CORONAVIRUS 2 (SARS-COV-2) (CORONAVIRUS DISEASE [COVID-19]), AMPLIFIED PROBE TECHNIQUE, MAKING USE OF HIGH THROUGHPUT TECHNOLOGIES AS DESCRIBED BY CMS-2020-01-R: HCPCS

## 2021-04-23 LAB — SARS-COV-2, COV2NT: NOT DETECTED

## 2021-04-25 RX ORDER — DOBUTAMINE HYDROCHLORIDE 200 MG/100ML
0-10 INJECTION INTRAVENOUS
Status: DISCONTINUED | OUTPATIENT
Start: 2021-04-26 | End: 2021-04-27

## 2021-04-25 RX ORDER — HEPARIN SOD,PORCINE/0.9 % NACL 30K/1000ML
50-1000 INTRAVENOUS SOLUTION INTRAVENOUS AS NEEDED
Status: DISCONTINUED | OUTPATIENT
Start: 2021-04-26 | End: 2021-04-26 | Stop reason: HOSPADM

## 2021-04-25 RX ORDER — POTASSIUM CHLORIDE 29.8 MG/ML
20 INJECTION INTRAVENOUS ONCE
Status: DISCONTINUED | OUTPATIENT
Start: 2021-04-26 | End: 2021-04-26 | Stop reason: HOSPADM

## 2021-04-25 RX ORDER — ALBUMIN HUMAN 50 G/1000ML
25 SOLUTION INTRAVENOUS ONCE
Status: DISCONTINUED | OUTPATIENT
Start: 2021-04-26 | End: 2021-04-26 | Stop reason: HOSPADM

## 2021-04-25 RX ORDER — NITROGLYCERIN 20 MG/100ML
16.5 INJECTION INTRAVENOUS CONTINUOUS
Status: DISCONTINUED | OUTPATIENT
Start: 2021-04-26 | End: 2021-04-26

## 2021-04-25 RX ORDER — MAGNESIUM SULFATE HEPTAHYDRATE 40 MG/ML
2 INJECTION, SOLUTION INTRAVENOUS ONCE
Status: DISCONTINUED | OUTPATIENT
Start: 2021-04-26 | End: 2021-04-26 | Stop reason: HOSPADM

## 2021-04-25 RX ORDER — DEXMEDETOMIDINE HYDROCHLORIDE 4 UG/ML
.1-1.5 INJECTION, SOLUTION INTRAVENOUS
Status: DISCONTINUED | OUTPATIENT
Start: 2021-04-26 | End: 2021-04-27

## 2021-04-25 RX ORDER — PROTAMINE SULFATE 10 MG/ML
500 INJECTION, SOLUTION INTRAVENOUS ONCE
Status: DISCONTINUED | OUTPATIENT
Start: 2021-04-26 | End: 2021-04-26 | Stop reason: HOSPADM

## 2021-04-26 ENCOUNTER — ANESTHESIA (OUTPATIENT)
Dept: CARDIOTHORACIC SURGERY | Age: 71
DRG: 236 | End: 2021-04-26
Payer: MEDICARE

## 2021-04-26 ENCOUNTER — ANESTHESIA EVENT (OUTPATIENT)
Dept: CARDIOTHORACIC SURGERY | Age: 71
DRG: 236 | End: 2021-04-26
Payer: MEDICARE

## 2021-04-26 ENCOUNTER — HOSPITAL ENCOUNTER (OUTPATIENT)
Dept: NON INVASIVE DIAGNOSTICS | Age: 71
Discharge: HOME | End: 2021-04-26
Attending: THORACIC SURGERY (CARDIOTHORACIC VASCULAR SURGERY)

## 2021-04-26 ENCOUNTER — APPOINTMENT (OUTPATIENT)
Dept: GENERAL RADIOLOGY | Age: 71
DRG: 236 | End: 2021-04-26
Attending: NURSE PRACTITIONER
Payer: MEDICARE

## 2021-04-26 ENCOUNTER — HOSPITAL ENCOUNTER (INPATIENT)
Age: 71
LOS: 4 days | Discharge: HOME HEALTH | DRG: 236 | End: 2021-04-30
Attending: THORACIC SURGERY (CARDIOTHORACIC VASCULAR SURGERY) | Admitting: THORACIC SURGERY (CARDIOTHORACIC VASCULAR SURGERY)
Payer: MEDICARE

## 2021-04-26 DIAGNOSIS — Z95.1 S/P CABG X 5: Primary | ICD-10-CM

## 2021-04-26 DIAGNOSIS — R73.9 TRANSIENT HYPERGLYCEMIA POST PROCEDURE: ICD-10-CM

## 2021-04-26 LAB
ABO + RH BLD: NORMAL
ADMINISTERED INITIALS, ADMINIT: NORMAL
ALBUMIN SERPL-MCNC: 3.4 G/DL (ref 3.5–5)
ALBUMIN SERPL-MCNC: 3.6 G/DL (ref 3.5–5)
ALBUMIN/GLOB SERPL: 1.8 {RATIO} (ref 1.1–2.2)
ALBUMIN/GLOB SERPL: 1.8 {RATIO} (ref 1.1–2.2)
ALP SERPL-CCNC: 42 U/L (ref 45–117)
ALP SERPL-CCNC: 46 U/L (ref 45–117)
ALT SERPL-CCNC: 36 U/L (ref 12–78)
ALT SERPL-CCNC: 37 U/L (ref 12–78)
ANION GAP SERPL CALC-SCNC: 7 MMOL/L (ref 5–15)
ANION GAP SERPL CALC-SCNC: 7 MMOL/L (ref 5–15)
APTT PPP: 26.1 SEC (ref 22.1–31)
ARTERIAL PATENCY WRIST A: ABNORMAL
ARTERIAL PATENCY WRIST A: ABNORMAL
ARTERIAL PATENCY WRIST A: YES
ARTERIAL PATENCY WRIST A: YES
AST SERPL-CCNC: 28 U/L (ref 15–37)
AST SERPL-CCNC: 42 U/L (ref 15–37)
BASE DEFICIT BLD-SCNC: 3 MMOL/L
BASE DEFICIT BLD-SCNC: 3 MMOL/L
BASE DEFICIT BLD-SCNC: 5 MMOL/L
BASE DEFICIT BLD-SCNC: 6 MMOL/L
BASOPHILS # BLD: 0 K/UL (ref 0–0.1)
BASOPHILS NFR BLD: 0 % (ref 0–1)
BDY SITE: ABNORMAL
BILIRUB SERPL-MCNC: 0.6 MG/DL (ref 0.2–1)
BILIRUB SERPL-MCNC: 0.8 MG/DL (ref 0.2–1)
BLD PROD TYP BPU: NORMAL
BLD PROD TYP BPU: NORMAL
BLOOD GROUP ANTIBODIES SERPL: NORMAL
BPU ID: NORMAL
BPU ID: NORMAL
BUN SERPL-MCNC: 16 MG/DL (ref 6–20)
BUN SERPL-MCNC: 18 MG/DL (ref 6–20)
BUN/CREAT SERPL: 16 (ref 12–20)
BUN/CREAT SERPL: 19 (ref 12–20)
CA-I BLD-SCNC: 1.16 MMOL/L (ref 1.12–1.32)
CA-I BLD-SCNC: 1.16 MMOL/L (ref 1.12–1.32)
CA-I BLD-SCNC: 1.18 MMOL/L (ref 1.12–1.32)
CA-I BLD-SCNC: 1.19 MMOL/L (ref 1.12–1.32)
CALCIUM SERPL-MCNC: 7.2 MG/DL (ref 8.5–10.1)
CALCIUM SERPL-MCNC: 7.6 MG/DL (ref 8.5–10.1)
CHLORIDE SERPL-SCNC: 114 MMOL/L (ref 97–108)
CHLORIDE SERPL-SCNC: 115 MMOL/L (ref 97–108)
CO2 SERPL-SCNC: 22 MMOL/L (ref 21–32)
CO2 SERPL-SCNC: 23 MMOL/L (ref 21–32)
CREAT SERPL-MCNC: 0.97 MG/DL (ref 0.7–1.3)
CREAT SERPL-MCNC: 1.03 MG/DL (ref 0.7–1.3)
CROSSMATCH RESULT,%XM: NORMAL
CROSSMATCH RESULT,%XM: NORMAL
D50 ADMINISTERED, D50ADM: 0 ML
D50 ORDER, D50ORD: 0 ML
DIFFERENTIAL METHOD BLD: ABNORMAL
EOSINOPHIL # BLD: 0.1 K/UL (ref 0–0.4)
EOSINOPHIL NFR BLD: 1 % (ref 0–7)
ERYTHROCYTE [DISTWIDTH] IN BLOOD BY AUTOMATED COUNT: 15.6 % (ref 11.5–14.5)
GAS FLOW.O2 O2 DELIVERY SYS: ABNORMAL L/MIN
GAS FLOW.O2 SETTING OXYMISER: 16 BPM
GAS FLOW.O2 SETTING OXYMISER: 16 BPM
GLOBULIN SER CALC-MCNC: 1.9 G/DL (ref 2–4)
GLOBULIN SER CALC-MCNC: 2 G/DL (ref 2–4)
GLSCOM COMMENTS: NORMAL
GLUCOSE BLD STRIP.AUTO-MCNC: 103 MG/DL (ref 65–100)
GLUCOSE BLD STRIP.AUTO-MCNC: 105 MG/DL (ref 65–100)
GLUCOSE BLD STRIP.AUTO-MCNC: 110 MG/DL (ref 65–100)
GLUCOSE BLD STRIP.AUTO-MCNC: 115 MG/DL (ref 65–100)
GLUCOSE BLD STRIP.AUTO-MCNC: 115 MG/DL (ref 65–100)
GLUCOSE BLD STRIP.AUTO-MCNC: 124 MG/DL (ref 65–100)
GLUCOSE BLD STRIP.AUTO-MCNC: 135 MG/DL (ref 65–100)
GLUCOSE BLD STRIP.AUTO-MCNC: 98 MG/DL (ref 65–100)
GLUCOSE SERPL-MCNC: 118 MG/DL (ref 65–100)
GLUCOSE SERPL-MCNC: 127 MG/DL (ref 65–100)
GLUCOSE, GLC: 103 MG/DL
GLUCOSE, GLC: 105 MG/DL
GLUCOSE, GLC: 110 MG/DL
GLUCOSE, GLC: 110 MG/DL
GLUCOSE, GLC: 115 MG/DL
GLUCOSE, GLC: 115 MG/DL
GLUCOSE, GLC: 124 MG/DL
GLUCOSE, GLC: 134 MG/DL
GLUCOSE, GLC: 135 MG/DL
GLUCOSE, GLC: 144 MG/DL
GLUCOSE, GLC: 146 MG/DL
GLUCOSE, GLC: 98 MG/DL
HCO3 BLD-SCNC: 20.2 MMOL/L (ref 22–26)
HCO3 BLD-SCNC: 20.8 MMOL/L (ref 22–26)
HCO3 BLD-SCNC: 22.8 MMOL/L (ref 22–26)
HCO3 BLD-SCNC: 23.9 MMOL/L (ref 22–26)
HCT VFR BLD AUTO: 30.4 % (ref 36.6–50.3)
HCT VFR BLD AUTO: 32.3 % (ref 36.6–50.3)
HGB BLD-MCNC: 9.4 G/DL (ref 12.1–17)
HGB BLD-MCNC: 9.9 G/DL (ref 12.1–17)
HIGH TARGET, HITG: 130 MG/DL
HIGH TARGET, HITG: 140 MG/DL
IMM GRANULOCYTES # BLD AUTO: 0.1 K/UL (ref 0–0.04)
IMM GRANULOCYTES NFR BLD AUTO: 1 % (ref 0–0.5)
INR PPP: 1.2 (ref 0.9–1.1)
INSULIN ADMINSTERED, INSADM: 1.5 UNITS/HOUR
INSULIN ADMINSTERED, INSADM: 1.9 UNITS/HOUR
INSULIN ADMINSTERED, INSADM: 2 UNITS/HOUR
INSULIN ADMINSTERED, INSADM: 2.2 UNITS/HOUR
INSULIN ADMINSTERED, INSADM: 2.3 UNITS/HOUR
INSULIN ADMINSTERED, INSADM: 2.5 UNITS/HOUR
INSULIN ADMINSTERED, INSADM: 2.5 UNITS/HOUR
INSULIN ADMINSTERED, INSADM: 2.8 UNITS/HOUR
INSULIN ADMINSTERED, INSADM: 2.8 UNITS/HOUR
INSULIN ADMINSTERED, INSADM: 3.2 UNITS/HOUR
INSULIN ADMINSTERED, INSADM: 3.4 UNITS/HOUR
INSULIN ADMINSTERED, INSADM: 3.8 UNITS/HOUR
INSULIN ORDER, INSORD: 1.5 UNITS/HOUR
INSULIN ORDER, INSORD: 1.9 UNITS/HOUR
INSULIN ORDER, INSORD: 2 UNITS/HOUR
INSULIN ORDER, INSORD: 2.2 UNITS/HOUR
INSULIN ORDER, INSORD: 2.3 UNITS/HOUR
INSULIN ORDER, INSORD: 2.5 UNITS/HOUR
INSULIN ORDER, INSORD: 2.5 UNITS/HOUR
INSULIN ORDER, INSORD: 2.8 UNITS/HOUR
INSULIN ORDER, INSORD: 2.8 UNITS/HOUR
INSULIN ORDER, INSORD: 3.2 UNITS/HOUR
INSULIN ORDER, INSORD: 3.4 UNITS/HOUR
INSULIN ORDER, INSORD: 3.8 UNITS/HOUR
LOW TARGET, LOT: 100 MG/DL
LOW TARGET, LOT: 95 MG/DL
LYMPHOCYTES # BLD: 0.6 K/UL (ref 0.8–3.5)
LYMPHOCYTES NFR BLD: 8 % (ref 12–49)
MAGNESIUM SERPL-MCNC: 2.7 MG/DL (ref 1.6–2.4)
MAGNESIUM SERPL-MCNC: 2.8 MG/DL (ref 1.6–2.4)
MCH RBC QN AUTO: 21 PG (ref 26–34)
MCHC RBC AUTO-ENTMCNC: 30.9 G/DL (ref 30–36.5)
MCV RBC AUTO: 68 FL (ref 80–99)
MINUTES UNTIL NEXT BG, NBG: 120 MIN
MINUTES UNTIL NEXT BG, NBG: 120 MIN
MINUTES UNTIL NEXT BG, NBG: 60 MIN
MONOCYTES # BLD: 0.3 K/UL (ref 0–1)
MONOCYTES NFR BLD: 4 % (ref 5–13)
MULTIPLIER, MUL: 0.02
MULTIPLIER, MUL: 0.03
MULTIPLIER, MUL: 0.04
MULTIPLIER, MUL: 0.04
MULTIPLIER, MUL: 0.05
NEUTS SEG # BLD: 6.5 K/UL (ref 1.8–8)
NEUTS SEG NFR BLD: 86 % (ref 32–75)
NRBC # BLD: 0 K/UL (ref 0–0.01)
NRBC BLD-RTO: 0 PER 100 WBC
O2/TOTAL GAS SETTING VFR VENT: 0.8 %
O2/TOTAL GAS SETTING VFR VENT: 80 %
ORDER INITIALS, ORDINIT: NORMAL
PCO2 BLD: 36.8 MMHG (ref 35–45)
PCO2 BLD: 42.7 MMHG (ref 35–45)
PCO2 BLD: 43.8 MMHG (ref 35–45)
PCO2 BLD: 50.5 MMHG (ref 35–45)
PEEP RESPIRATORY: 5 CMH2O
PH BLD: 7.28 [PH] (ref 7.35–7.45)
PH BLD: 7.3 [PH] (ref 7.35–7.45)
PH BLD: 7.32 [PH] (ref 7.35–7.45)
PH BLD: 7.35 [PH] (ref 7.35–7.45)
PLATELET # BLD AUTO: 125 K/UL (ref 150–400)
PMV BLD AUTO: 10.6 FL (ref 8.9–12.9)
PO2 BLD: 108 MMHG (ref 80–100)
PO2 BLD: 110 MMHG (ref 80–100)
PO2 BLD: 166 MMHG (ref 80–100)
PO2 BLD: 43 MMHG (ref 80–100)
POTASSIUM SERPL-SCNC: 3.8 MMOL/L (ref 3.5–5.1)
POTASSIUM SERPL-SCNC: 4.1 MMOL/L (ref 3.5–5.1)
PRESSURE SUPPORT SETTING VENT: 5 CMH2O
PRESSURE SUPPORT SETTING VENT: 8 CMH2O
PROT SERPL-MCNC: 5.3 G/DL (ref 6.4–8.2)
PROT SERPL-MCNC: 5.6 G/DL (ref 6.4–8.2)
PROTHROMBIN TIME: 12.1 SEC (ref 9–11.1)
RBC # BLD AUTO: 4.47 M/UL (ref 4.1–5.7)
RBC MORPH BLD: ABNORMAL
SAO2 % BLD: 72 % (ref 92–97)
SAO2 % BLD: 98 % (ref 92–97)
SAO2 % BLD: 98 % (ref 92–97)
SAO2 % BLD: 99 % (ref 92–97)
SERVICE CMNT-IMP: ABNORMAL
SERVICE CMNT-IMP: NORMAL
SODIUM SERPL-SCNC: 144 MMOL/L (ref 136–145)
SODIUM SERPL-SCNC: 144 MMOL/L (ref 136–145)
SPECIMEN EXP DATE BLD: NORMAL
SPECIMEN TYPE: ABNORMAL
STATUS OF UNIT,%ST: NORMAL
STATUS OF UNIT,%ST: NORMAL
THERAPEUTIC RANGE,PTTT: NORMAL SECS (ref 58–77)
UNIT DIVISION, %UDIV: 0
UNIT DIVISION, %UDIV: 0
VENTILATION MODE VENT: ABNORMAL
VOLUME CONTROL IVLC: YES
VOLUME CONTROL IVLC: YES
VT SETTING VENT: 470 ML
VT SETTING VENT: 470 ML
WBC # BLD AUTO: 7.6 K/UL (ref 4.1–11.1)

## 2021-04-26 PROCEDURE — 77030019702 HC WRP THER MENM -C: Performed by: THORACIC SURGERY (CARDIOTHORACIC VASCULAR SURGERY)

## 2021-04-26 PROCEDURE — 74011000250 HC RX REV CODE- 250: Performed by: NURSE PRACTITIONER

## 2021-04-26 PROCEDURE — 77030041076 HC DRSG AG OPTICELL MDII -A: Performed by: THORACIC SURGERY (CARDIOTHORACIC VASCULAR SURGERY)

## 2021-04-26 PROCEDURE — 77030013798 HC KT TRNSDUC PRSSR EDWD -B: Performed by: THORACIC SURGERY (CARDIOTHORACIC VASCULAR SURGERY)

## 2021-04-26 PROCEDURE — 74011636637 HC RX REV CODE- 636/637: Performed by: NURSE ANESTHETIST, CERTIFIED REGISTERED

## 2021-04-26 PROCEDURE — 65610000003 HC RM ICU SURGICAL

## 2021-04-26 PROCEDURE — 74011000258 HC RX REV CODE- 258: Performed by: NURSE PRACTITIONER

## 2021-04-26 PROCEDURE — 77030020263 HC SOL INJ SOD CL0.9% LFCR 1000ML: Performed by: THORACIC SURGERY (CARDIOTHORACIC VASCULAR SURGERY)

## 2021-04-26 PROCEDURE — 33508 ENDOSCOPIC VEIN HARVEST: CPT | Performed by: THORACIC SURGERY (CARDIOTHORACIC VASCULAR SURGERY)

## 2021-04-26 PROCEDURE — 85730 THROMBOPLASTIN TIME PARTIAL: CPT

## 2021-04-26 PROCEDURE — 77030002987 HC SUT PROL J&J -B: Performed by: THORACIC SURGERY (CARDIOTHORACIC VASCULAR SURGERY)

## 2021-04-26 PROCEDURE — 06BQ4ZZ EXCISION OF LEFT SAPHENOUS VEIN, PERCUTANEOUS ENDOSCOPIC APPROACH: ICD-10-PCS | Performed by: THORACIC SURGERY (CARDIOTHORACIC VASCULAR SURGERY)

## 2021-04-26 PROCEDURE — 74011000250 HC RX REV CODE- 250: Performed by: THORACIC SURGERY (CARDIOTHORACIC VASCULAR SURGERY)

## 2021-04-26 PROCEDURE — 77030012390 HC DRN CHST BTL GTNG -B: Performed by: THORACIC SURGERY (CARDIOTHORACIC VASCULAR SURGERY)

## 2021-04-26 PROCEDURE — 77030022199 HC SYS HARV VESL GTNG -G1: Performed by: THORACIC SURGERY (CARDIOTHORACIC VASCULAR SURGERY)

## 2021-04-26 PROCEDURE — 77030037878 HC DRSG MEPILEX >48IN BORD MOLN -B

## 2021-04-26 PROCEDURE — 33521 CABG ARTERY-VEIN FOUR: CPT | Performed by: THORACIC SURGERY (CARDIOTHORACIC VASCULAR SURGERY)

## 2021-04-26 PROCEDURE — 80053 COMPREHEN METABOLIC PANEL: CPT

## 2021-04-26 PROCEDURE — 77030006689 HC BLD OPHTH BVR BD -A: Performed by: THORACIC SURGERY (CARDIOTHORACIC VASCULAR SURGERY)

## 2021-04-26 PROCEDURE — 83735 ASSAY OF MAGNESIUM: CPT

## 2021-04-26 PROCEDURE — 85610 PROTHROMBIN TIME: CPT

## 2021-04-26 PROCEDURE — 74011000250 HC RX REV CODE- 250: Performed by: NURSE ANESTHETIST, CERTIFIED REGISTERED

## 2021-04-26 PROCEDURE — 77030010605 HC BLWR MR MAL MEDT -B: Performed by: THORACIC SURGERY (CARDIOTHORACIC VASCULAR SURGERY)

## 2021-04-26 PROCEDURE — 77030005402 HC CATH RAD ART LN KT TELE -B

## 2021-04-26 PROCEDURE — 77030003010 HC SUT SURG STL J&J -B: Performed by: THORACIC SURGERY (CARDIOTHORACIC VASCULAR SURGERY)

## 2021-04-26 PROCEDURE — 93355 ECHO TRANSESOPHAGEAL (TEE): CPT | Performed by: THORACIC SURGERY (CARDIOTHORACIC VASCULAR SURGERY)

## 2021-04-26 PROCEDURE — 33533 CABG ARTERIAL SINGLE: CPT | Performed by: THORACIC SURGERY (CARDIOTHORACIC VASCULAR SURGERY)

## 2021-04-26 PROCEDURE — 74011250636 HC RX REV CODE- 250/636: Performed by: NURSE PRACTITIONER

## 2021-04-26 PROCEDURE — 77030010507 HC ADH SKN DERMBND J&J -B: Performed by: THORACIC SURGERY (CARDIOTHORACIC VASCULAR SURGERY)

## 2021-04-26 PROCEDURE — 02100Z9 BYPASS CORONARY ARTERY, ONE ARTERY FROM LEFT INTERNAL MAMMARY, OPEN APPROACH: ICD-10-PCS | Performed by: THORACIC SURGERY (CARDIOTHORACIC VASCULAR SURGERY)

## 2021-04-26 PROCEDURE — 77030010389 HC WRE ATR PACE AEMC -B: Performed by: THORACIC SURGERY (CARDIOTHORACIC VASCULAR SURGERY)

## 2021-04-26 PROCEDURE — C1751 CATH, INF, PER/CENT/MIDLINE: HCPCS

## 2021-04-26 PROCEDURE — 77030002933 HC SUT MCRYL J&J -A: Performed by: THORACIC SURGERY (CARDIOTHORACIC VASCULAR SURGERY)

## 2021-04-26 PROCEDURE — 85018 HEMOGLOBIN: CPT

## 2021-04-26 PROCEDURE — 77030018831 HC SOL IRR H20 BAXT -A: Performed by: THORACIC SURGERY (CARDIOTHORACIC VASCULAR SURGERY)

## 2021-04-26 PROCEDURE — 74011250636 HC RX REV CODE- 250/636: Performed by: NURSE ANESTHETIST, CERTIFIED REGISTERED

## 2021-04-26 PROCEDURE — 77030002973 HC SUT PLEDG CV SFT OVL TELE -B: Performed by: THORACIC SURGERY (CARDIOTHORACIC VASCULAR SURGERY)

## 2021-04-26 PROCEDURE — 82803 BLOOD GASES ANY COMBINATION: CPT

## 2021-04-26 PROCEDURE — 77030018729 HC ELECTRD DEFIB PAD CARD -B

## 2021-04-26 PROCEDURE — 77030006247 HC LD PCMKR MYOCRD BPLR TEMP MEDT -B: Performed by: THORACIC SURGERY (CARDIOTHORACIC VASCULAR SURGERY)

## 2021-04-26 PROCEDURE — 76010000117 HC CV SURG 5.5 TO 6 HR: Performed by: THORACIC SURGERY (CARDIOTHORACIC VASCULAR SURGERY)

## 2021-04-26 PROCEDURE — 74011000258 HC RX REV CODE- 258: Performed by: NURSE ANESTHETIST, CERTIFIED REGISTERED

## 2021-04-26 PROCEDURE — 77030005513 HC CATH URETH FOL11 MDII -B: Performed by: THORACIC SURGERY (CARDIOTHORACIC VASCULAR SURGERY)

## 2021-04-26 PROCEDURE — 77030003029 HC SUT VCRL J&J -B: Performed by: THORACIC SURGERY (CARDIOTHORACIC VASCULAR SURGERY)

## 2021-04-26 PROCEDURE — 77030038692 HC WND DEB SYS IRMX -B: Performed by: THORACIC SURGERY (CARDIOTHORACIC VASCULAR SURGERY)

## 2021-04-26 PROCEDURE — 77030008771 HC TU NG SALEM SUMP -A: Performed by: ANESTHESIOLOGY

## 2021-04-26 PROCEDURE — 2709999900 HC NON-CHARGEABLE SUPPLY: Performed by: THORACIC SURGERY (CARDIOTHORACIC VASCULAR SURGERY)

## 2021-04-26 PROCEDURE — 76060000042 HC ANESTHESIA 5.5 TO 6 HR: Performed by: THORACIC SURGERY (CARDIOTHORACIC VASCULAR SURGERY)

## 2021-04-26 PROCEDURE — 06BP4ZZ EXCISION OF RIGHT SAPHENOUS VEIN, PERCUTANEOUS ENDOSCOPIC APPROACH: ICD-10-PCS | Performed by: THORACIC SURGERY (CARDIOTHORACIC VASCULAR SURGERY)

## 2021-04-26 PROCEDURE — C1713 ANCHOR/SCREW BN/BN,TIS/BN: HCPCS | Performed by: THORACIC SURGERY (CARDIOTHORACIC VASCULAR SURGERY)

## 2021-04-26 PROCEDURE — 74011000258 HC RX REV CODE- 258: Performed by: THORACIC SURGERY (CARDIOTHORACIC VASCULAR SURGERY)

## 2021-04-26 PROCEDURE — 77030037878 HC DRSG MEPILEX >48IN BORD MOLN -B: Performed by: THORACIC SURGERY (CARDIOTHORACIC VASCULAR SURGERY)

## 2021-04-26 PROCEDURE — 77030006994: Performed by: THORACIC SURGERY (CARDIOTHORACIC VASCULAR SURGERY)

## 2021-04-26 PROCEDURE — 77030026438 HC STYL ET INTUB CARD -A: Performed by: ANESTHESIOLOGY

## 2021-04-26 PROCEDURE — 5A1221Z PERFORMANCE OF CARDIAC OUTPUT, CONTINUOUS: ICD-10-PCS | Performed by: THORACIC SURGERY (CARDIOTHORACIC VASCULAR SURGERY)

## 2021-04-26 PROCEDURE — B24BZZ4 ULTRASONOGRAPHY OF HEART WITH AORTA, TRANSESOPHAGEAL: ICD-10-PCS | Performed by: THORACIC SURGERY (CARDIOTHORACIC VASCULAR SURGERY)

## 2021-04-26 PROCEDURE — 77030002986 HC SUT PROL J&J -A: Performed by: THORACIC SURGERY (CARDIOTHORACIC VASCULAR SURGERY)

## 2021-04-26 PROCEDURE — 74011250636 HC RX REV CODE- 250/636: Performed by: ANESTHESIOLOGY

## 2021-04-26 PROCEDURE — 2709999900 HC NON-CHARGEABLE SUPPLY

## 2021-04-26 PROCEDURE — 74011250636 HC RX REV CODE- 250/636: Performed by: THORACIC SURGERY (CARDIOTHORACIC VASCULAR SURGERY)

## 2021-04-26 PROCEDURE — P9045 ALBUMIN (HUMAN), 5%, 250 ML: HCPCS | Performed by: NURSE PRACTITIONER

## 2021-04-26 PROCEDURE — 77030041523 HC SEALNT FIBRN VITASEAL J&J -E: Performed by: THORACIC SURGERY (CARDIOTHORACIC VASCULAR SURGERY)

## 2021-04-26 PROCEDURE — 71045 X-RAY EXAM CHEST 1 VIEW: CPT

## 2021-04-26 PROCEDURE — 77030006920 HC BLD STRNL SAW STRY -B: Performed by: THORACIC SURGERY (CARDIOTHORACIC VASCULAR SURGERY)

## 2021-04-26 PROCEDURE — 77030018729 HC ELECTRD DEFIB PAD CARD -B: Performed by: THORACIC SURGERY (CARDIOTHORACIC VASCULAR SURGERY)

## 2021-04-26 PROCEDURE — 77030041238 HC TBNG INSUF MDII -A: Performed by: THORACIC SURGERY (CARDIOTHORACIC VASCULAR SURGERY)

## 2021-04-26 PROCEDURE — 77030021177: Performed by: THORACIC SURGERY (CARDIOTHORACIC VASCULAR SURGERY)

## 2021-04-26 PROCEDURE — 77030040504 HC DRN WND MDII -B: Performed by: THORACIC SURGERY (CARDIOTHORACIC VASCULAR SURGERY)

## 2021-04-26 PROCEDURE — 85025 COMPLETE CBC W/AUTO DIFF WBC: CPT

## 2021-04-26 PROCEDURE — 82962 GLUCOSE BLOOD TEST: CPT

## 2021-04-26 PROCEDURE — 36415 COLL VENOUS BLD VENIPUNCTURE: CPT

## 2021-04-26 PROCEDURE — 74011250637 HC RX REV CODE- 250/637: Performed by: NURSE PRACTITIONER

## 2021-04-26 PROCEDURE — 77030008684 HC TU ET CUF COVD -B: Performed by: ANESTHESIOLOGY

## 2021-04-26 PROCEDURE — 77030019908 HC STETH ESOPH SIMS -A: Performed by: ANESTHESIOLOGY

## 2021-04-26 PROCEDURE — 94002 VENT MGMT INPAT INIT DAY: CPT

## 2021-04-26 PROCEDURE — 77030041244 HC CBL PACE EXT TEMP REMG -B: Performed by: THORACIC SURGERY (CARDIOTHORACIC VASCULAR SURGERY)

## 2021-04-26 PROCEDURE — 77030018835 HC SOL IRR LR ICUM -A: Performed by: THORACIC SURGERY (CARDIOTHORACIC VASCULAR SURGERY)

## 2021-04-26 PROCEDURE — 021309W BYPASS CORONARY ARTERY, FOUR OR MORE ARTERIES FROM AORTA WITH AUTOLOGOUS VENOUS TISSUE, OPEN APPROACH: ICD-10-PCS | Performed by: THORACIC SURGERY (CARDIOTHORACIC VASCULAR SURGERY)

## 2021-04-26 PROCEDURE — 77030031126 HC SUT CUST KT J&J -B: Performed by: THORACIC SURGERY (CARDIOTHORACIC VASCULAR SURGERY)

## 2021-04-26 DEVICE — PUTTY BONE HEMSTAT ABSORB MTRX 2.0GRAM: Type: IMPLANTABLE DEVICE | Status: FUNCTIONAL

## 2021-04-26 DEVICE — LEAD PCMKR MYOCARDL BPLR TEMP. --: Type: IMPLANTABLE DEVICE | Status: FUNCTIONAL

## 2021-04-26 DEVICE — TEMP PACING WIRE
Type: IMPLANTABLE DEVICE | Status: FUNCTIONAL
Brand: MYO/WIRE

## 2021-04-26 RX ORDER — SODIUM CHLORIDE 450 MG/100ML
10 INJECTION, SOLUTION INTRAVENOUS CONTINUOUS
Status: DISCONTINUED | OUTPATIENT
Start: 2021-04-26 | End: 2021-04-27

## 2021-04-26 RX ORDER — SODIUM CHLORIDE 0.9 % (FLUSH) 0.9 %
5-40 SYRINGE (ML) INJECTION EVERY 8 HOURS
Status: CANCELLED | OUTPATIENT
Start: 2021-04-26

## 2021-04-26 RX ORDER — ACETAMINOPHEN 325 MG/1
650 TABLET ORAL EVERY 4 HOURS
Status: DISPENSED | OUTPATIENT
Start: 2021-04-26 | End: 2021-04-28

## 2021-04-26 RX ORDER — GUAIFENESIN 100 MG/5ML
81 LIQUID (ML) ORAL DAILY
Status: DISCONTINUED | OUTPATIENT
Start: 2021-04-27 | End: 2021-04-30 | Stop reason: HOSPADM

## 2021-04-26 RX ORDER — SODIUM CHLORIDE 0.9 % (FLUSH) 0.9 %
5-40 SYRINGE (ML) INJECTION AS NEEDED
Status: DISCONTINUED | OUTPATIENT
Start: 2021-04-26 | End: 2021-04-26 | Stop reason: HOSPADM

## 2021-04-26 RX ORDER — DIPHENHYDRAMINE HCL 25 MG
25 CAPSULE ORAL
Status: DISCONTINUED | OUTPATIENT
Start: 2021-04-26 | End: 2021-04-30 | Stop reason: HOSPADM

## 2021-04-26 RX ORDER — HEPARIN SODIUM 1000 [USP'U]/ML
INJECTION, SOLUTION INTRAVENOUS; SUBCUTANEOUS AS NEEDED
Status: DISCONTINUED | OUTPATIENT
Start: 2021-04-26 | End: 2021-04-26 | Stop reason: HOSPADM

## 2021-04-26 RX ORDER — MIDAZOLAM HYDROCHLORIDE 1 MG/ML
1 INJECTION, SOLUTION INTRAMUSCULAR; INTRAVENOUS AS NEEDED
Status: DISCONTINUED | OUTPATIENT
Start: 2021-04-26 | End: 2021-04-26 | Stop reason: HOSPADM

## 2021-04-26 RX ORDER — MIDAZOLAM HYDROCHLORIDE 1 MG/ML
0.5 INJECTION, SOLUTION INTRAMUSCULAR; INTRAVENOUS
Status: CANCELLED | OUTPATIENT
Start: 2021-04-26

## 2021-04-26 RX ORDER — SODIUM CHLORIDE 9 MG/ML
INJECTION, SOLUTION INTRAVENOUS
Status: DISCONTINUED | OUTPATIENT
Start: 2021-04-26 | End: 2021-04-26 | Stop reason: HOSPADM

## 2021-04-26 RX ORDER — SUFENTANIL CITRATE 50 UG/ML
INJECTION EPIDURAL; INTRAVENOUS AS NEEDED
Status: DISCONTINUED | OUTPATIENT
Start: 2021-04-26 | End: 2021-04-26 | Stop reason: HOSPADM

## 2021-04-26 RX ORDER — AMIODARONE HYDROCHLORIDE 200 MG/1
400 TABLET ORAL EVERY 12 HOURS
Status: DISCONTINUED | OUTPATIENT
Start: 2021-04-27 | End: 2021-04-30 | Stop reason: HOSPADM

## 2021-04-26 RX ORDER — ALBUMIN HUMAN 50 G/1000ML
12.5 SOLUTION INTRAVENOUS
Status: COMPLETED | OUTPATIENT
Start: 2021-04-26 | End: 2021-04-27

## 2021-04-26 RX ORDER — LANOLIN ALCOHOL/MO/W.PET/CERES
3 CREAM (GRAM) TOPICAL
Status: DISCONTINUED | OUTPATIENT
Start: 2021-04-26 | End: 2021-04-30

## 2021-04-26 RX ORDER — MORPHINE SULFATE 2 MG/ML
2 INJECTION, SOLUTION INTRAMUSCULAR; INTRAVENOUS
Status: DISCONTINUED | OUTPATIENT
Start: 2021-04-26 | End: 2021-04-27

## 2021-04-26 RX ORDER — LIDOCAINE HYDROCHLORIDE 20 MG/ML
INJECTION, SOLUTION EPIDURAL; INFILTRATION; INTRACAUDAL; PERINEURAL AS NEEDED
Status: DISCONTINUED | OUTPATIENT
Start: 2021-04-26 | End: 2021-04-26 | Stop reason: HOSPADM

## 2021-04-26 RX ORDER — PAPAVERINE HYDROCHLORIDE 30 MG/ML
INJECTION INTRAMUSCULAR; INTRAVENOUS AS NEEDED
Status: DISCONTINUED | OUTPATIENT
Start: 2021-04-26 | End: 2021-04-26 | Stop reason: HOSPADM

## 2021-04-26 RX ORDER — SODIUM CHLORIDE, SODIUM LACTATE, POTASSIUM CHLORIDE, CALCIUM CHLORIDE 600; 310; 30; 20 MG/100ML; MG/100ML; MG/100ML; MG/100ML
INJECTION, SOLUTION INTRAVENOUS
Status: DISCONTINUED | OUTPATIENT
Start: 2021-04-26 | End: 2021-04-26 | Stop reason: HOSPADM

## 2021-04-26 RX ORDER — FAMOTIDINE 20 MG/1
20 TABLET, FILM COATED ORAL EVERY 12 HOURS
Status: DISCONTINUED | OUTPATIENT
Start: 2021-04-27 | End: 2021-04-30 | Stop reason: HOSPADM

## 2021-04-26 RX ORDER — OXYCODONE HYDROCHLORIDE 5 MG/1
5 TABLET ORAL
Status: DISCONTINUED | OUTPATIENT
Start: 2021-04-26 | End: 2021-04-30 | Stop reason: HOSPADM

## 2021-04-26 RX ORDER — ALBUTEROL SULFATE 0.83 MG/ML
2.5 SOLUTION RESPIRATORY (INHALATION)
Status: DISCONTINUED | OUTPATIENT
Start: 2021-04-26 | End: 2021-04-30 | Stop reason: HOSPADM

## 2021-04-26 RX ORDER — ROPIVACAINE HYDROCHLORIDE 5 MG/ML
30 INJECTION, SOLUTION EPIDURAL; INFILTRATION; PERINEURAL AS NEEDED
Status: DISCONTINUED | OUTPATIENT
Start: 2021-04-26 | End: 2021-04-26 | Stop reason: HOSPADM

## 2021-04-26 RX ORDER — ONDANSETRON 2 MG/ML
4 INJECTION INTRAMUSCULAR; INTRAVENOUS
Status: DISCONTINUED | OUTPATIENT
Start: 2021-04-26 | End: 2021-04-30 | Stop reason: HOSPADM

## 2021-04-26 RX ORDER — SUFENTANIL CITRATE 50 UG/ML
INJECTION EPIDURAL; INTRAVENOUS
Status: DISCONTINUED | OUTPATIENT
Start: 2021-04-26 | End: 2021-04-26 | Stop reason: HOSPADM

## 2021-04-26 RX ORDER — MAGNESIUM SULFATE HEPTAHYDRATE 40 MG/ML
INJECTION, SOLUTION INTRAVENOUS AS NEEDED
Status: DISCONTINUED | OUTPATIENT
Start: 2021-04-26 | End: 2021-04-26 | Stop reason: HOSPADM

## 2021-04-26 RX ORDER — CHLORHEXIDINE GLUCONATE 1.2 MG/ML
10 RINSE ORAL EVERY 12 HOURS
Status: DISCONTINUED | OUTPATIENT
Start: 2021-04-26 | End: 2021-04-30 | Stop reason: HOSPADM

## 2021-04-26 RX ORDER — MAGNESIUM SULFATE 1 G/100ML
1 INJECTION INTRAVENOUS AS NEEDED
Status: DISCONTINUED | OUTPATIENT
Start: 2021-04-26 | End: 2021-04-27

## 2021-04-26 RX ORDER — HYDROMORPHONE HYDROCHLORIDE 1 MG/ML
0.5 INJECTION, SOLUTION INTRAMUSCULAR; INTRAVENOUS; SUBCUTANEOUS
Status: CANCELLED | OUTPATIENT
Start: 2021-04-26

## 2021-04-26 RX ORDER — SODIUM CHLORIDE 0.9 % (FLUSH) 0.9 %
5-40 SYRINGE (ML) INJECTION AS NEEDED
Status: DISCONTINUED | OUTPATIENT
Start: 2021-04-26 | End: 2021-04-27

## 2021-04-26 RX ORDER — AMOXICILLIN 250 MG
1 CAPSULE ORAL 2 TIMES DAILY
Status: DISCONTINUED | OUTPATIENT
Start: 2021-04-27 | End: 2021-04-30 | Stop reason: HOSPADM

## 2021-04-26 RX ORDER — LIDOCAINE HYDROCHLORIDE 10 MG/ML
0.1 INJECTION, SOLUTION EPIDURAL; INFILTRATION; INTRACAUDAL; PERINEURAL AS NEEDED
Status: DISCONTINUED | OUTPATIENT
Start: 2021-04-26 | End: 2021-04-26 | Stop reason: HOSPADM

## 2021-04-26 RX ORDER — LANOLIN ALCOHOL/MO/W.PET/CERES
400 CREAM (GRAM) TOPICAL 2 TIMES DAILY
Status: DISCONTINUED | OUTPATIENT
Start: 2021-04-27 | End: 2021-04-30 | Stop reason: HOSPADM

## 2021-04-26 RX ORDER — POTASSIUM CHLORIDE 29.8 MG/ML
20 INJECTION INTRAVENOUS
Status: DISCONTINUED | OUTPATIENT
Start: 2021-04-26 | End: 2021-04-27

## 2021-04-26 RX ORDER — PROTAMINE SULFATE 10 MG/ML
INJECTION, SOLUTION INTRAVENOUS AS NEEDED
Status: DISCONTINUED | OUTPATIENT
Start: 2021-04-26 | End: 2021-04-26 | Stop reason: HOSPADM

## 2021-04-26 RX ORDER — SODIUM CHLORIDE 0.9 % (FLUSH) 0.9 %
5-40 SYRINGE (ML) INJECTION EVERY 8 HOURS
Status: DISCONTINUED | OUTPATIENT
Start: 2021-04-26 | End: 2021-04-27

## 2021-04-26 RX ORDER — ONDANSETRON 2 MG/ML
4 INJECTION INTRAMUSCULAR; INTRAVENOUS AS NEEDED
Status: CANCELLED | OUTPATIENT
Start: 2021-04-26

## 2021-04-26 RX ORDER — DESMOPRESSIN ACETATE 4 UG/ML
INJECTION, SOLUTION INTRAVENOUS; SUBCUTANEOUS AS NEEDED
Status: DISCONTINUED | OUTPATIENT
Start: 2021-04-26 | End: 2021-04-26 | Stop reason: HOSPADM

## 2021-04-26 RX ORDER — INSULIN LISPRO 100 [IU]/ML
INJECTION, SOLUTION INTRAVENOUS; SUBCUTANEOUS
Status: DISCONTINUED | OUTPATIENT
Start: 2021-04-26 | End: 2021-04-29

## 2021-04-26 RX ORDER — MORPHINE SULFATE 2 MG/ML
INJECTION, SOLUTION INTRAMUSCULAR; INTRAVENOUS AS NEEDED
Status: DISCONTINUED | OUTPATIENT
Start: 2021-04-26 | End: 2021-04-26 | Stop reason: HOSPADM

## 2021-04-26 RX ORDER — OXYCODONE HYDROCHLORIDE 5 MG/1
10 TABLET ORAL
Status: DISCONTINUED | OUTPATIENT
Start: 2021-04-26 | End: 2021-04-30 | Stop reason: HOSPADM

## 2021-04-26 RX ORDER — PROPOFOL 10 MG/ML
INJECTION, EMULSION INTRAVENOUS AS NEEDED
Status: DISCONTINUED | OUTPATIENT
Start: 2021-04-26 | End: 2021-04-26 | Stop reason: HOSPADM

## 2021-04-26 RX ORDER — MORPHINE SULFATE 4 MG/ML
4 INJECTION INTRAVENOUS
Status: DISCONTINUED | OUTPATIENT
Start: 2021-04-26 | End: 2021-04-27

## 2021-04-26 RX ORDER — SODIUM CHLORIDE 9 MG/ML
9 INJECTION, SOLUTION INTRAVENOUS CONTINUOUS
Status: DISCONTINUED | OUTPATIENT
Start: 2021-04-26 | End: 2021-04-27

## 2021-04-26 RX ORDER — SODIUM CHLORIDE 0.9 % (FLUSH) 0.9 %
5-40 SYRINGE (ML) INJECTION EVERY 8 HOURS
Status: DISCONTINUED | OUTPATIENT
Start: 2021-04-26 | End: 2021-04-26 | Stop reason: HOSPADM

## 2021-04-26 RX ORDER — BACITRACIN 500 UNIT/G
1 PACKET (EA) TOPICAL AS NEEDED
Status: DISCONTINUED | OUTPATIENT
Start: 2021-04-26 | End: 2021-04-27

## 2021-04-26 RX ORDER — MUPIROCIN 20 MG/G
OINTMENT TOPICAL 2 TIMES DAILY
Status: DISCONTINUED | OUTPATIENT
Start: 2021-04-26 | End: 2021-04-30 | Stop reason: HOSPADM

## 2021-04-26 RX ORDER — MAGNESIUM SULFATE 100 %
4 CRYSTALS MISCELLANEOUS AS NEEDED
Status: DISCONTINUED | OUTPATIENT
Start: 2021-04-26 | End: 2021-04-30 | Stop reason: HOSPADM

## 2021-04-26 RX ORDER — DIPHENHYDRAMINE HYDROCHLORIDE 50 MG/ML
25 INJECTION, SOLUTION INTRAMUSCULAR; INTRAVENOUS
Status: DISCONTINUED | OUTPATIENT
Start: 2021-04-26 | End: 2021-04-30 | Stop reason: HOSPADM

## 2021-04-26 RX ORDER — MORPHINE SULFATE 2 MG/ML
2 INJECTION, SOLUTION INTRAMUSCULAR; INTRAVENOUS
Status: CANCELLED | OUTPATIENT
Start: 2021-04-26

## 2021-04-26 RX ORDER — SODIUM CHLORIDE 0.9 % (FLUSH) 0.9 %
5-40 SYRINGE (ML) INJECTION AS NEEDED
Status: CANCELLED | OUTPATIENT
Start: 2021-04-26

## 2021-04-26 RX ORDER — INSULIN GLARGINE 100 [IU]/ML
1-50 INJECTION, SOLUTION SUBCUTANEOUS
Status: ACTIVE | OUTPATIENT
Start: 2021-04-26 | End: 2021-04-27

## 2021-04-26 RX ORDER — ACETAMINOPHEN 325 MG/1
650 TABLET ORAL ONCE
Status: DISCONTINUED | OUTPATIENT
Start: 2021-04-26 | End: 2021-04-26 | Stop reason: HOSPADM

## 2021-04-26 RX ORDER — SUCCINYLCHOLINE CHLORIDE 20 MG/ML
INJECTION INTRAMUSCULAR; INTRAVENOUS AS NEEDED
Status: DISCONTINUED | OUTPATIENT
Start: 2021-04-26 | End: 2021-04-26 | Stop reason: HOSPADM

## 2021-04-26 RX ORDER — FENTANYL CITRATE 50 UG/ML
25 INJECTION, SOLUTION INTRAMUSCULAR; INTRAVENOUS
Status: CANCELLED | OUTPATIENT
Start: 2021-04-26

## 2021-04-26 RX ORDER — POLYETHYLENE GLYCOL 3350 17 G/17G
17 POWDER, FOR SOLUTION ORAL DAILY
Status: DISCONTINUED | OUTPATIENT
Start: 2021-04-27 | End: 2021-04-30 | Stop reason: HOSPADM

## 2021-04-26 RX ORDER — ATORVASTATIN CALCIUM 40 MG/1
40 TABLET, FILM COATED ORAL
Status: DISCONTINUED | OUTPATIENT
Start: 2021-04-26 | End: 2021-04-30 | Stop reason: HOSPADM

## 2021-04-26 RX ORDER — DEXTROSE 50 % IN WATER (D50W) INTRAVENOUS SYRINGE
12.5-25 AS NEEDED
Status: DISCONTINUED | OUTPATIENT
Start: 2021-04-26 | End: 2021-04-30 | Stop reason: HOSPADM

## 2021-04-26 RX ORDER — NALOXONE HYDROCHLORIDE 0.4 MG/ML
0.4 INJECTION, SOLUTION INTRAMUSCULAR; INTRAVENOUS; SUBCUTANEOUS AS NEEDED
Status: DISCONTINUED | OUTPATIENT
Start: 2021-04-26 | End: 2021-04-27

## 2021-04-26 RX ORDER — SODIUM CHLORIDE 9 MG/ML
25 INJECTION, SOLUTION INTRAVENOUS CONTINUOUS
Status: DISCONTINUED | OUTPATIENT
Start: 2021-04-26 | End: 2021-04-26 | Stop reason: HOSPADM

## 2021-04-26 RX ORDER — FACIAL-BODY WIPES
10 EACH TOPICAL DAILY PRN
Status: DISCONTINUED | OUTPATIENT
Start: 2021-04-26 | End: 2021-04-30 | Stop reason: HOSPADM

## 2021-04-26 RX ORDER — SODIUM CHLORIDE, SODIUM LACTATE, POTASSIUM CHLORIDE, CALCIUM CHLORIDE 600; 310; 30; 20 MG/100ML; MG/100ML; MG/100ML; MG/100ML
100 INJECTION, SOLUTION INTRAVENOUS CONTINUOUS
Status: DISCONTINUED | OUTPATIENT
Start: 2021-04-26 | End: 2021-04-26 | Stop reason: HOSPADM

## 2021-04-26 RX ORDER — TAMSULOSIN HYDROCHLORIDE 0.4 MG/1
0.4 CAPSULE ORAL DAILY
Status: DISCONTINUED | OUTPATIENT
Start: 2021-04-27 | End: 2021-04-29

## 2021-04-26 RX ORDER — MIDAZOLAM HYDROCHLORIDE 1 MG/ML
1 INJECTION, SOLUTION INTRAMUSCULAR; INTRAVENOUS
Status: DISCONTINUED | OUTPATIENT
Start: 2021-04-26 | End: 2021-04-27

## 2021-04-26 RX ORDER — ROCURONIUM BROMIDE 10 MG/ML
INJECTION, SOLUTION INTRAVENOUS AS NEEDED
Status: DISCONTINUED | OUTPATIENT
Start: 2021-04-26 | End: 2021-04-26 | Stop reason: HOSPADM

## 2021-04-26 RX ORDER — SODIUM CHLORIDE, SODIUM LACTATE, POTASSIUM CHLORIDE, CALCIUM CHLORIDE 600; 310; 30; 20 MG/100ML; MG/100ML; MG/100ML; MG/100ML
100 INJECTION, SOLUTION INTRAVENOUS CONTINUOUS
Status: CANCELLED | OUTPATIENT
Start: 2021-04-26

## 2021-04-26 RX ORDER — FENTANYL CITRATE 50 UG/ML
50 INJECTION, SOLUTION INTRAMUSCULAR; INTRAVENOUS AS NEEDED
Status: DISCONTINUED | OUTPATIENT
Start: 2021-04-26 | End: 2021-04-26 | Stop reason: HOSPADM

## 2021-04-26 RX ORDER — INSULIN LISPRO 100 [IU]/ML
INJECTION, SOLUTION INTRAVENOUS; SUBCUTANEOUS
Status: DISCONTINUED | OUTPATIENT
Start: 2021-04-26 | End: 2021-04-30 | Stop reason: HOSPADM

## 2021-04-26 RX ORDER — DIPHENHYDRAMINE HYDROCHLORIDE 50 MG/ML
12.5 INJECTION, SOLUTION INTRAMUSCULAR; INTRAVENOUS AS NEEDED
Status: CANCELLED | OUTPATIENT
Start: 2021-04-26 | End: 2021-04-26

## 2021-04-26 RX ADMIN — DEXMEDETOMIDINE HYDROCHLORIDE 0.4 MCG/KG/HR: 100 INJECTION, SOLUTION, CONCENTRATE INTRAVENOUS at 12:16

## 2021-04-26 RX ADMIN — MORPHINE SULFATE 2 MG: 2 INJECTION, SOLUTION INTRAMUSCULAR; INTRAVENOUS at 19:01

## 2021-04-26 RX ADMIN — SUFENTANIL CITRATE 0.2 MCG/KG/HR: 50 INJECTION EPIDURAL; INTRAVENOUS at 08:34

## 2021-04-26 RX ADMIN — AMIODARONE HYDROCHLORIDE 1 MG/MIN: 50 INJECTION, SOLUTION INTRAVENOUS at 20:51

## 2021-04-26 RX ADMIN — FAMOTIDINE 20 MG: 10 INJECTION, SOLUTION INTRAVENOUS at 15:28

## 2021-04-26 RX ADMIN — AMINOCAPROIC ACID 10 G: 250 INJECTION, SOLUTION INTRAVENOUS at 09:02

## 2021-04-26 RX ADMIN — AMIODARONE HYDROCHLORIDE 1 MG/MIN: 50 INJECTION, SOLUTION INTRAVENOUS at 14:33

## 2021-04-26 RX ADMIN — PHENYLEPHRINE HYDROCHLORIDE 50 MCG/MIN: 10 INJECTION INTRAVENOUS at 09:30

## 2021-04-26 RX ADMIN — SODIUM CHLORIDE, POTASSIUM CHLORIDE, SODIUM LACTATE AND CALCIUM CHLORIDE 100 ML/HR: 600; 310; 30; 20 INJECTION, SOLUTION INTRAVENOUS at 06:40

## 2021-04-26 RX ADMIN — SODIUM CHLORIDE 3 UNITS/HR: 9 INJECTION, SOLUTION INTRAVENOUS at 10:25

## 2021-04-26 RX ADMIN — Medication 10 ML: at 15:34

## 2021-04-26 RX ADMIN — CEFAZOLIN SODIUM 2 G: 1 INJECTION, POWDER, FOR SOLUTION INTRAMUSCULAR; INTRAVENOUS at 18:20

## 2021-04-26 RX ADMIN — ACETAMINOPHEN 650 MG: 325 TABLET, FILM COATED ORAL at 21:42

## 2021-04-26 RX ADMIN — CHLORHEXIDINE GLUCONATE 10 ML: 1.2 RINSE ORAL at 15:28

## 2021-04-26 RX ADMIN — SUCCINYLCHOLINE CHLORIDE 140 MG: 20 INJECTION, SOLUTION INTRAMUSCULAR; INTRAVENOUS at 08:26

## 2021-04-26 RX ADMIN — SODIUM CHLORIDE 5 MG/HR: 9 INJECTION, SOLUTION INTRAVENOUS at 09:12

## 2021-04-26 RX ADMIN — MUPIROCIN: 20 OINTMENT TOPICAL at 18:23

## 2021-04-26 RX ADMIN — MORPHINE SULFATE 4 MG: 4 INJECTION, SOLUTION INTRAMUSCULAR; INTRAVENOUS at 21:45

## 2021-04-26 RX ADMIN — ACETAMINOPHEN 650 MG: 325 TABLET, FILM COATED ORAL at 15:27

## 2021-04-26 RX ADMIN — SUFENTANIL CITRATE 10 MCG: 50 INJECTION EPIDURAL; INTRAVENOUS at 08:26

## 2021-04-26 RX ADMIN — FAMOTIDINE 20 MG: 10 INJECTION, SOLUTION INTRAVENOUS at 20:42

## 2021-04-26 RX ADMIN — SODIUM CHLORIDE: 900 INJECTION, SOLUTION INTRAVENOUS at 08:15

## 2021-04-26 RX ADMIN — OXYCODONE 10 MG: 5 TABLET ORAL at 20:40

## 2021-04-26 RX ADMIN — ROCURONIUM BROMIDE 10 MG: 10 INJECTION INTRAVENOUS at 09:40

## 2021-04-26 RX ADMIN — Medication 10 ML: at 21:39

## 2021-04-26 RX ADMIN — SODIUM CHLORIDE 10 ML/HR: 4.5 INJECTION, SOLUTION INTRAVENOUS at 14:00

## 2021-04-26 RX ADMIN — DESMOPRESSIN ACETATE 24 MCG: 4 SOLUTION INTRAVENOUS at 12:14

## 2021-04-26 RX ADMIN — SODIUM CHLORIDE 1 G/HR: 900 INJECTION, SOLUTION INTRAVENOUS at 09:17

## 2021-04-26 RX ADMIN — FENTANYL CITRATE 50 MCG: 50 INJECTION, SOLUTION INTRAMUSCULAR; INTRAVENOUS at 07:15

## 2021-04-26 RX ADMIN — ROCURONIUM BROMIDE 20 MG: 10 INJECTION INTRAVENOUS at 09:02

## 2021-04-26 RX ADMIN — ALBUMIN (HUMAN) 12.5 G: 12.5 INJECTION, SOLUTION INTRAVENOUS at 18:23

## 2021-04-26 RX ADMIN — LIDOCAINE HYDROCHLORIDE 100 MG: 20 INJECTION, SOLUTION EPIDURAL; INFILTRATION; INTRACAUDAL; PERINEURAL at 08:26

## 2021-04-26 RX ADMIN — POTASSIUM CHLORIDE 20 MEQ: 400 INJECTION, SOLUTION INTRAVENOUS at 16:36

## 2021-04-26 RX ADMIN — MORPHINE SULFATE 4 MG: 2 INJECTION, SOLUTION INTRAMUSCULAR; INTRAVENOUS at 09:17

## 2021-04-26 RX ADMIN — SUFENTANIL CITRATE 10 MCG: 50 INJECTION EPIDURAL; INTRAVENOUS at 09:04

## 2021-04-26 RX ADMIN — MAGNESIUM SULFATE 2 G: 2 INJECTION INTRAVENOUS at 12:15

## 2021-04-26 RX ADMIN — PROTAMINE SULFATE 350 MG: 10 INJECTION, SOLUTION INTRAVENOUS at 12:10

## 2021-04-26 RX ADMIN — ATORVASTATIN CALCIUM 40 MG: 40 TABLET, FILM COATED ORAL at 21:41

## 2021-04-26 RX ADMIN — ROCURONIUM BROMIDE 10 MG: 10 INJECTION INTRAVENOUS at 08:26

## 2021-04-26 RX ADMIN — SODIUM CHLORIDE, POTASSIUM CHLORIDE, SODIUM LACTATE AND CALCIUM CHLORIDE: 600; 310; 30; 20 INJECTION, SOLUTION INTRAVENOUS at 08:15

## 2021-04-26 RX ADMIN — MORPHINE SULFATE 4 MG: 4 INJECTION, SOLUTION INTRAMUSCULAR; INTRAVENOUS at 23:37

## 2021-04-26 RX ADMIN — CEFAZOLIN SODIUM 2 G: 1 INJECTION, POWDER, FOR SOLUTION INTRAMUSCULAR; INTRAVENOUS at 23:35

## 2021-04-26 RX ADMIN — HEPARIN SODIUM 30000 UNITS: 1000 INJECTION, SOLUTION INTRAVENOUS; SUBCUTANEOUS at 10:13

## 2021-04-26 RX ADMIN — WATER 2 G: 1 INJECTION INTRAMUSCULAR; INTRAVENOUS; SUBCUTANEOUS at 12:00

## 2021-04-26 RX ADMIN — ROCURONIUM BROMIDE 40 MG: 10 INJECTION INTRAVENOUS at 08:30

## 2021-04-26 RX ADMIN — CHLORHEXIDINE GLUCONATE 10 ML: 1.2 RINSE ORAL at 20:45

## 2021-04-26 RX ADMIN — WATER 2 G: 1 INJECTION INTRAMUSCULAR; INTRAVENOUS; SUBCUTANEOUS at 08:50

## 2021-04-26 RX ADMIN — MIDAZOLAM HYDROCHLORIDE 2 MG: 1 INJECTION, SOLUTION INTRAMUSCULAR; INTRAVENOUS at 07:15

## 2021-04-26 RX ADMIN — PROPOFOL 150 MG: 10 INJECTION, EMULSION INTRAVENOUS at 08:26

## 2021-04-26 NOTE — CONSULTS
[facility]    ICU TEAM Consult    Name: Katelin Tariq   : 1950   MRN: [id number]   Date: 2021      Reason for ICU Admission: Multivessel coronary artery disease status post CABG x5 on     HPI:  Currently patient sedated intubated. History obtained from medical record. This is a 18-year-old gentleman 18-pack-year smoking who had previous history of coronary artery disease, recent CVA affecting visual field, hypertension, thalassemia trait and dyslipidemia who was admitted today directly to undergo CABG by Dr. Noam Black. Initially patient was referred from [facility] in 2021 with ongoing chest pain on exertion was found to have multi vessel disease and referred for CABG. However he was found to have acute CVA as evidence with changes in his visual field and abnormal small vessels on MRI. He was discharged home with a plan for outpatient follow-up and admission in the future for CABG. Active Problem List:     Problem List  Date Reviewed: 2021          Codes Class    CAD (coronary artery disease) ICD-10-CM: I25.10  ICD-9-CM: 414.00         S/P CABG x 5 ICD-10-CM: Z95.1  ICD-9-CM: V45.81     Overview Signed 2021  1:30 PM by Rachelle Quan PA-C     CABG X 5 LIMA to LAD, RSVG to PDA, Sequential RSVG to Diag, OM1, OM2  LEFT AND RIGHT ENDOSCOPIC SAPHENOUS VEIN HARVEST             Acute CVA (cerebrovascular accident) ([facility] [address] ICD-10-CM: I63.9  ICD-9-CM: 434.91         Unstable angina (HCC) ICD-10-CM: I20.0  ICD-9-CM: 411.1               Past Medical History:      has a past medical history of CAD (coronary artery disease), Chronic lower back pain, CVA (cerebral vascular accident) ([address] HLD (hyperlipidemia), HTN (hypertension), Kidney stones, and Thalassemia trait. Past Surgical History:      has a past surgical history that includes hx hernia repair (Right) and hx prostate surgery.     Home Medications:     Prior to Admission medications Medication Sig Start Date End Date Taking? Authorizing Provider   isosorbide mononitrate ER (IMDUR) 30 mg tablet Take 15 mg by mouth daily. Yes Provider, Historical   mupirocin (BACTROBAN) 2 % ointment by Both Nostrils route two (2) times a day for 2 days. 21 Yes Ernestina Moe NP   chlorhexidine (Peridex) 0.12 % solution 15 mL by Swish and Spit route every twelve (12) hours for 2 days. 21 Yes Ernestina Moe NP   alfuzosin SR (UROXATRAL) 10 mg SR tablet Take 1 Tab by mouth nightly. Indications: enlarged prostate with urination problem 3/25/21  Yes Katherine Barrow NP   atorvastatin (LIPITOR) 40 mg tablet Take 1 Tab by mouth daily. 3/8/21  Yes Dayana MACK NP   aspirin delayed-release 81 mg tablet Take 81 mg by mouth daily. Yes Provider, Historical   metoprolol tartrate (LOPRESSOR) 25 mg tablet Take 25 mg by mouth two (2) times a day. Yes Provider, Historical   nitroglycerin (NITROSTAT) 0.4 mg SL tablet 1 Tab by SubLINGual route every five (5) minutes as needed for Chest Pain. Up to 3 doses. Indications: acute attack of angina 3/16/21   Dayana MACK NP   clopidogreL (PLAVIX) 75 mg tab Take 1 Tab by mouth daily.  3/8/21   Katherine Barrow NP       Allergies/Social/Family History:     No Known Allergies   Social History     Tobacco Use    Smoking status: Former Smoker     Packs/day: 0.50     Years: 36.00     Pack years: 18.00     Types: Cigarettes     Quit date: 2002     Years since quittin.2    Smokeless tobacco: Never Used   Substance Use Topics    Alcohol use: Not on file      Family History   Problem Relation Age of Onset    Alzheimer Mother     Heart Disease Father     Heart Surgery Father        Review of Systems:     Not able to obtain due to patient medical condition    Objective:   Vital Signs:  Visit Vitals  /77   Pulse 80   Temp 97.1 °F (36.2 °C)   Resp 16   Ht 5' 3.75\" (1.619 m)   Wt 73 kg (160 lb 15 oz)   SpO2 98%   BMI 27.84 kg/m²      O2 Device: None (Room air) Temp (24hrs), Av.7 °F (36.5 °C), Min:97.1 °F (36.2 °C), Max:98.2 °F (36.8 °C)           Intake/Output:     Intake/Output Summary (Last 24 hours) at 2021 1529  Last data filed at 2021 1500  Gross per 24 hour   Intake    Output 965 ml   Net -965 ml       Physical Exam:    General:   Sedated intubated mechanical ventilation   Eyes:  Sclera anicteric. Pupils equally round and reactive to light. Mouth/Throat: Mucous membranes normal, ET tube   Neck: Supple   Lungs:   Clear to auscultation bilaterally, good effort, drains in place   CV:  Regular rate and rhythm,no murmur, click, rub or gallop   Abdomen:   Soft, non-tender. bowel sounds normal. non-distended   Extremities: No cyanosis or edema   Skin: Skin color, texture, turgor normal. no acute rash or lesions   Lymph nodes: Cervical and supraclavicular normal   Musculoskeletal: No swelling or deformity   Lines/Devices:  Intact, no erythema, drainage or tenderness   Neuro:  Sedated intubated mechanical ventilation. Difficult to assess at this time       LABS AND  DATA: Personally reviewed  Recent Labs     21  1442   WBC 7.6   HGB 9.4*   HCT 30.4*   *     No results for input(s): NA, K, CL, CO2, BUN, CREA, GLU, CA, MG, PHOS in the last 72 hours. No results for input(s): AP, TBIL, TP, ALB, GLOB, AML, LPSE in the last 72 hours. No lab exists for component: SGOT, GPT, AMYP  No results for input(s): INR, PTP, APTT, INREXT in the last 72 hours. Recent Labs     21  1456 21  1450   PHI 7.28* 7.32*   PCO2I 50.5* 43.8   PO2I 43* 166*   FIO2I 0.80 80     No results for input(s): CPK, CKMB, TROIQ, BNPP in the last 72 hours.     Hemodynamics:   PAP: PAP Systolic: 31 (87/54/77 3027) CO: CO (l/min): 5 l/min (21 1500)   Wedge:   CI: CI (l/min/m2): 2.9 l/min/m2 (21 1500)   CVP:    SVR: BP 2: 140/83 (21 0615)     PVR:       Ventilator Settings:  Mode Rate Tidal Volume Pressure FiO2 PEEP   SIMV   470 ml  5 cm H2O 80 % 5 cm H20     Peak airway pressure: 18 cm H2O    Minute ventilation: 7.81 l/min        MEDS: Reviewed    Chest X-Ray:  CXR Results  (Last 48 hours)               04/26/21 1417  XR CHEST PORT Final result    Impression:  Life-support lines and tubes as described above. Bilateral lower   lobe atelectasis following CABG procedure. Narrative: Indication: Status post CABG. Comparison to 4/20/2021. Portable exam obtained at 7860 demonstrates placement   of a right IJ [place]-Nilda catheter with the tip projecting over the proximal right   pulmonary artery. ET tube has been placed with the tip projecting above the   thoracic inlet. NG tube has been placed with the tip projecting over the fundus. Mediastinal drain and left-sided chest tube project in satisfactory position. The patient is status post CABG procedure. Bilateral lower lobe atelectasis is   noted. There is no pneumothorax. ECHO:  · Saline contrast was given to evaluate for intracardiac shunt. · LV: Estimated LVEF is 55 - 60%. Normal cavity size, wall thickness and systolic function (ejection fraction normal). Wall motion: normal.      Echo Findings     Left Ventricle Normal cavity size, wall thickness and systolic function (ejection fraction normal). Wall motion: normal. The estimated EF is 55 - 60%. Left Atrium Normal cavity size. Right Ventricle Normal cavity size, wall thickness and global systolic function. Right Atrium Normal cavity size. Interatrial Septum No interatrial shunt visualized on color doppler. Aortic Valve Normal valve structure, no stenosis and no regurgitation. Mitral Valve Normal valve structure, no stenosis and no regurgitation. Tricuspid Valve Normal valve structure, no stenosis and no regurgitation. Pulmonic Valve Pulmonic valve not well visualized, but normal doppler findings. No stenosis. Aorta Normal aortic root.    Pulmonary Artery Normal pulmonary arteries. IVC/Hepatic Veins Normal structure. Pericardium No evidence of pericardial effusion.              Assessment and Plan:   Postoperative respiratory insufficiency: At this time we will continue with ERAS protocol, seems to be weaning appropriately. Hopefully aiming to extubate him later this evening. Post extubation will encourage incentive spirometry and mobility as tolerated. Adequate pain control. Coronary artery disease status post CABG x5 on April 26:  Hypertension  Dyslipidemia  Thalassemia trait:        CRITICAL CARE CONSULTANT NOTE  I had a face to face encounter with the patient, reviewed and interpreted patient data including clinical events, labs, images, vital signs, I/O's, and examined patient. I have discussed the case and the plan and management of the patient's care with the consulting services, the bedside nurses and the respiratory therapist.      NOTE OF PERSONAL INVOLVEMENT IN CARE   This patient has a high probability of imminent, clinically significant deterioration, which requires the highest level of preparedness to intervene urgently. I participated in the decision-making and personally managed or directed the management of the following life and organ supporting interventions that required my frequent assessment to treat or prevent imminent deterioration. I personally spent 35 minutes of critical care time. This is time spent at this critically ill patient's bedside actively involved in patient care as well as the coordination of care and discussions with the patient's family. This does not include any procedural time which has been billed separately. Tereso Martinez M.D.   Staff Intensivist/Pulmonologist  [facility]  4/26/2021

## 2021-04-26 NOTE — ANESTHESIA PROCEDURE NOTES
Central Line Placement    Start time: 4/26/2021 7:16 AM  End time: 4/26/2021 7:33 AM  Performed by: Licha Groves MD  Authorized by: Licha Groves MD     Indications: vascular access, central pressure monitoring and need for vasopressors  Preanesthetic Checklist: patient identified, risks and benefits discussed, anesthesia consent, site marked, patient being monitored and timeout performed      Pre-procedure: All elements of maximal sterile barrier technique followed? Yes    2% Chlorhexidine for cutaneous antisepsis, Hand hygiene performed prior to catheter insertion and Ultrasound guidance    Sterile Ultrasound Technique followed?: Yes            Procedure:   Prep:  Chlorhexidine    Orientation:  Right    Catheter type:  Triple lumen  Catheter size:  9 Fr  Catheter length:  16 cm  Number of attempts:  1  Successful placement: Yes      Assessment:   Post-procedure:  Catheter secured, sterile dressing applied and sterile dressing with CHG applied  Assessment:  Blood return through all ports and free fluid flow  Insertion:  Uncomplicated  Patient tolerance:  Patient tolerated the procedure well with no immediate complications  PA catheter inserted .

## 2021-04-26 NOTE — ANESTHESIA PROCEDURE NOTES
Arterial Line Placement    Start time: 4/26/2021 7:36 AM  End time: 4/26/2021 7:46 AM  Performed by: Licha Groves MD  Authorized by: Licha Groves MD     Pre-Procedure  Indications:  Arterial pressure monitoring and blood sampling  Preanesthetic Checklist: patient identified, risks and benefits discussed, anesthesia consent, site marked, patient being monitored, timeout performed and patient being monitored      Procedure:   Prep:  Chlorhexidine  Seldinger Technique?: Yes    Orientation:  Right  Location:  Radial artery  Catheter size:  20 G  Number of attempts:  1    Assessment:   Post-procedure:  Line secured and sterile dressing applied  Patient Tolerance:  Patient tolerated the procedure well with no immediate complications

## 2021-04-26 NOTE — BRIEF OP NOTE
Brief Postoperative Note    Patient: Katelin Tariq  YOB: 1950  MRN: [id number]    Date of Procedure: 4/26/2021     Pre-Op Diagnosis: CAD    Post-Op Diagnosis: Same as preoperative diagnosis. Procedure(s):  CABG X 5 LIMA to LAD, RSVG to PDA, Sequential RSVG to Diag, OM1, OM2  LEFT AND RIGHT ENDOSCOPIC SAPHENOUS VEIN HARVEST    Surgeon(s):  Shauna Wilburn MD    Physician Assistant: Rachelle Quan PA-C    Anesthesia: General     Estimated Blood Loss (mL): 1000    Cell Saver: [id number]    Complications: None    Specimens: * No specimens in log *     Implants: * No implants in log *    Drains:   Orogastric Tube 04/26/21 (Active)       Lena Rock #1 04/26/21 Anterior;Mid;Lower Chest (Active)   Site Assessment Clean, dry, & intact 04/26/21 1300   Dressing Status Clean, dry, & intact 04/26/21 1300   Drainage Description Serosanguinous 04/26/21 1300   Kirill Drain Airleak No 04/26/21 1300   Status Draining;Suction (specify 04/26/21 1300   Y Connector Used Yes 04/26/21 [address] [place] [address] #2 04/26/21 Lower; Anterior;Mid Chest (Active)   Site Assessment Clean, dry, & intact 04/26/21 1300   Dressing Status Clean, dry, & intact 04/26/21 1300   Drainage Description Serosanguinous 04/26/21 1300   Kirill Drain Airleak No 04/26/21 1300   Status Draining;Suction (specify 04/26/21 1300   Y Connector Used Yes 04/26/21 1300       Kirill Drain #3 04/26/21 Anterior; Lower;Mid Chest (Active)   Site Assessment Clean, dry, & intact 04/26/21 1300   Dressing Status Clean, dry, & intact 04/26/21 1300   Drainage Description Serosanguinous 04/26/21 1300   Kirill Drain Airleak No 04/26/21 1300   Status Draining;Suction (specify 04/26/21 1300   Y Connector Used Yes 04/26/21 1300       Findings: CAD      Electronically Signed by Latasha Bonds PA-C on 4/26/2021 at 1:27 PM

## 2021-04-26 NOTE — OP NOTES
Coronary Artery Bypass Graft Procedure Note      Pre-operative Diagnosis:                                              Disease of the native coronary arteries                                              Angina pectoris                                               Post-operative Diagnosis: same    Surgeon: Radha Melendez MD    Assistant: DMITRY Hussein    Anesthesia: General endotracheal anesthesia    EBL:  See perfusion record    Specimen none    Implants none    Complications none        Operation: Coronary Bypass Grafting Procedure(s):  ON PUMP CORONARY ARTERY BYPASS GRAFT X 5  WITH JAIMES,  JAIMES to LAD  Triple seq SVG to diagonal, OM1, OM2  SVG to PDA    LEFT AND RIGHT SAPHENOUS VEIN HARVEST VIA EVH, NICK AND EPIAORTIC ULTRASOUND BY DR CULLEN [facility]    Operative Findings: At the time of the procedure there was moderate depression ov LV function . The coronary arteries were diffusely diseased and calcified. The ROBERTA was good quality with good flow. The vein  was difficult to harvest and both legs were used      Indications:  See pre op diagnosis     Procedure Details     After informed consent was obtained for the above mentioned procedure, the patient was then taken to the operating room. Appropriate monitoring lines were placed by the Anesthesia Department. A complete surgical timeout was completed. The NICK report was reviewed with anesthesia. After administration of general endotracheal anesthesia, the patient was prepped and draped in the usual sterile fashion. The chest was then entered through a standard mediastinotomy incision while simultaneously harvesting of peripheral conduit was undertaken using endoscopic technique following a bolus of heparin. After harvesting the conduit, it was inspected and noted to be adequate. The epiaortic ultrasound was performed and reviewed. The left internal mammary harvested was then undertaken in pedicle distal pedicle was incised and noted to bleed briskly.   ROBERTA was prepared with papaverine. The pericardium was then opened and tacked up into a pericardial well. The patient was heparinized. Pursestring sutures were then placed into the aorta, the right atrial appendage. The patient was then cannulated through these pursestrings at which point, cardiopulmonary bypass was started. A retrograde coronary sinus canula was placed for cardioplegia administration. A cross-clamp was then applied. Cardioplegia was administered initially and then given intermittently throughout the case. Initial attention was directed at the circumflex vessels. SVG to the Diagonal, OM 1 and OM2 sequentially  . Attention was then directed to the PDA. SVG to the PDA . The LIMA was used to graft the LAD. The proximal anastomosis of the vein graphs constructed to the ascending aorta. A warm dose of cardioplegia administered. The patient was weaned from bypass. Protamine was administered. The aortic cannula and venous cannula removed. The sites were reinforced. Ventricular and atrial pacing wires were then placed upon the heart, brought out through stab wounds inferiorly, and affixed to the skin. Mediastinal and L pleural chest tubes placed. . Hemostasis was assured, the sternum was reapproximated with heavy gauge stainless steel wire. The midline fascia was subsequently closed separately. Vicryl was then used in a running fashion for subcutaneous tissue and skin. Dressings were then applied to all wounds. The patient was then transported with monitors to the intensive care unit, intubated and ventilated. Physician Assistant  (PA) assistance was required due to the difficulty of the procedure. The PA assisted in the dissection of tissues, identification and exposure of pertinent anatomy including endoscopic harvest of the greater saphenous vein, and sternal closure in the operation to assure optimal patient outcome and safe conduct of the operation.                   Jacklyn Michel Hayley Edmond MD

## 2021-04-26 NOTE — PERIOP NOTES
PT AWAKE TO OR MOVED TO OR BED BY STAFF WITH SLIDE BOARD. MTRE WARMING BLANKET ON BED UNDER PATIENT. INDUCTION AND INTUBATION WITHOUT INCIDENT. GOMEZ INSERTED BY Lu Damian RN WITH CLEAR YELLOW RETURN. PT POSITIONED FOR PROCEDURE.

## 2021-04-26 NOTE — PROGRESS NOTES
Physical Therapy 4/26/2021    Orders received and chart reviewed up to date. Pt POD 0 CABG. Will follow-up tomorrow for PT evaluation as appropriate. Recommend with nursing patient to complete as able in order to maintain strength, endurance and independence: OOB to chair 3x/day. Thank you.   Marycarmen Dumont, PT, DPT

## 2021-04-26 NOTE — ANESTHESIA PREPROCEDURE EVALUATION
Relevant Problems   No relevant active problems       Anesthetic History   No history of anesthetic complications            Review of Systems / Medical History  Patient summary reviewed, nursing notes reviewed and pertinent labs reviewed    Pulmonary  Within defined limits                 Neuro/Psych   Within defined limits    CVA       Cardiovascular  Within defined limits  Hypertension          CAD and hyperlipidemia    Exercise tolerance: >4 METS     GI/Hepatic/Renal  Within defined limits       Renal disease: stones       Endo/Other  Within defined limits           Other Findings              Physical Exam    Airway  Mallampati: II  TM Distance: > 6 cm  Neck ROM: normal range of motion   Mouth opening: Normal     Cardiovascular  Regular rate and rhythm,  S1 and S2 normal,  no murmur, click, rub, or gallop             Dental  No notable dental hx       Pulmonary  Breath sounds clear to auscultation               Abdominal  GI exam deferred       Other Findings            Anesthetic Plan    ASA: 3  Anesthesia type: general    Monitoring Plan: Arterial line, CVP, BIS, [place] and NICK      Induction: Intravenous  Anesthetic plan and risks discussed with: Patient

## 2021-04-26 NOTE — H&P
Date of Surgery Update:  Tona Ramey was seen and examined. History and physical has been reviewed. The patient has been examined.  There have been no significant clinical changes since the completion of the originally dated History and Physical.    Signed By: Aster Turner PA-C     April 26, 2021 8:00 AM

## 2021-04-26 NOTE — ANESTHESIA PROCEDURE NOTES
NICK        Procedure Details: probe placement, image aquisition & interpretation    Site marked  Risks and benefits discussed with the patient and plans are to proceed    Procedure Note    Performed by: Jeet Marquis MD  Authorized by: Jeet Marquis MD       Indications: assessment of ascending aorta and assessment of surgical repair  Modalities: 2D, CF, CWD, contrast, PWD  Probe Type: epiaortic and multiplane  Insertion: atraumatic  Patient Status: intubated    Echocardiographic and Doppler Measurements   Aorta  Size  Diam(cm)  Dissection PlaqueThick(mm)  Plaque Mobile    Ascending normal  No 0-3 No    Arch normal  No 0-3 No    Descending normal  No >3 No          Valves  Annulus  Stenosis  Area/Grad  Regurg  Leaflet   Morph  Leaflet   Motion    Aortic normal none  1+ normal normal    Mitral dilated   1+ normal normal    Tricuspid normal none  1+ normal normal          Atria  Size  SEC (smoke)  Thrombus  Tumor  Device    Rt Atrium normal No No No No    Lt Atrium normal No No No No     Interatrial Septum Morphology: normal    Interventricular Septum Morphology: normal    Ventricle  Cavity Size  Cavity Dimension Hypertrophy  Thrombus  Gloal FXN  EF    RV normal  No no normal     LV normal  Yes No normal 60       Regional Function  (1 = normal, 2 = mildly hypokinetic, 3 = severely hypokinetic, 4 = akinetic, 5 = dyskinetic) LAV - Long [place]   ME LAV = 0  ME LAV = 90  ME LAV = 130   Basal Sept:1 Basal Ant:1 Basal Post:1   Mid Sept:1 Mid Ant:1 Mid Post:1   Apical Sept:1 Apical Ant:1 Basal Ant Sept:1   Basal Lat:1 Basal Inf:1    Mid Lat:1 Mid Inf:1    Apical Lat:1 Apical Inf:1        Pericardium: normal    Post Intervention Follow-up Study  Ventricular Global Function: improved  Ventricular Regional Function: improved     Valve  Function  Regurgitation  Area    Aortic no change      Mitral no change      Tricuspid no change      Prosthetic        Complications: None

## 2021-04-26 NOTE — PERIOP NOTES
TRANSFER - OUT REPORT:    Verbal report given to WARREN(name) on Yogesh Baez  being transferred to CVICU(unit) for routine post - op       Report consisted of patients Situation, Background, Assessment and   Recommendations(SBAR). Information from the following report(s) OR Summary was reviewed with the receiving nurse. Opportunity for questions and clarification was provided. Patient transported by unit bed with EKG monitor and defibrillator, O2 by ambu bag, ET tube, with oximeter.

## 2021-04-26 NOTE — CARDIO/PULMONARY
Cardiac Rehab: CABG educataion folder to the bedside of Zainab Guy and [facility] updated. Will continue to follow for enrollment in cardiac REHab. Regina Bear RN'

## 2021-04-26 NOTE — PROGRESS NOTES
1350: TRANSFER - IN REPORT:    Verbal report received from PA & CRNA(name) on Nigel Anthony  being received from OR(unit) for routine post - op      Report consisted of patients Situation, Background, Assessment and   Recommendations(SBAR). Information from the following report(s) SBAR and Cardiac Rhythm Paced was reviewed with the receiving nurse. Opportunity for questions and clarification was provided. Assessment completed upon patients arrival to unit and care assumed. Gtts verified with CRNA - Abiodun 60, Insulin 2, Dex 0.6, MIV 10    1420: Amio started at 1 per verbal orders from [facility] PA.    1451: ABG 7.324/43.8/166/-3/22.8/99 ; FiO2 decreased to 50%    1456: SvO2 72    1500: Pacer set to back up 50 & 5. Threshold 1.5mA. Pt's underlying rhythm: NSR w/ HR 70s. Dex decreased to 0.4 to begin to awaken pt for extubation    1600: Pt very sleepy - Dex paused to initiate awakening    1630: Pt awake and following commands. Rate halved. 1700: Turned and bathed pt. Pt remains drowsy. 1708: RT at bedside - pt switched to Spontaneous    1728: AB.295/42.7/108/-6/20.8/98. Lights turned on, pt sat up, TV on with volume increased to continue to awaken pt.    1735: Dr Hyun Templeton at bedside - left pt on Spontaneous, will continue to prompt awakening. 1805: AB.349/36.8/110/-5/20.2/98    1815: RT at bedside - pt extubated to 4L - pt A&Ox4, sats at 100%. 1823: CVP 4, PAD 7 - 1 Albumin given    190: 2mg Morphine given (pain 5/10)    : Bedside and Verbal shift change report given to Rao Mcdonald (oncoming nurse) by Ashly Pike Dr RN (offgoing nurse). Report included the following information SBAR and Cardiac Rhythm NSR.

## 2021-04-27 ENCOUNTER — APPOINTMENT (OUTPATIENT)
Dept: GENERAL RADIOLOGY | Age: 71
DRG: 236 | End: 2021-04-27
Attending: NURSE PRACTITIONER
Payer: MEDICARE

## 2021-04-27 LAB
ADMINISTERED INITIALS, ADMINIT: NORMAL
ALBUMIN SERPL-MCNC: 3.5 G/DL (ref 3.5–5)
ALBUMIN/GLOB SERPL: 1.9 {RATIO} (ref 1.1–2.2)
ALP SERPL-CCNC: 35 U/L (ref 45–117)
ALT SERPL-CCNC: 39 U/L (ref 12–78)
ANION GAP SERPL CALC-SCNC: 6 MMOL/L (ref 5–15)
ARTERIAL PATENCY WRIST A: ABNORMAL
AST SERPL-CCNC: 142 U/L (ref 15–37)
BASE DEFICIT BLD-SCNC: 4 MMOL/L
BDY SITE: ABNORMAL
BILIRUB SERPL-MCNC: 0.6 MG/DL (ref 0.2–1)
BUN SERPL-MCNC: 20 MG/DL (ref 6–20)
BUN/CREAT SERPL: 21 (ref 12–20)
CA-I BLD-SCNC: 1.15 MMOL/L (ref 1.12–1.32)
CALCIUM SERPL-MCNC: 7.6 MG/DL (ref 8.5–10.1)
CARB RATIO, CHOR: 15
CARB RATIO, CHOR: 15
CARBOHYDRATE EATEN, CHO: 15 G
CARBOHYDRATE EATEN, CHO: 70 G
CHLORIDE SERPL-SCNC: 114 MMOL/L (ref 97–108)
CO2 SERPL-SCNC: 23 MMOL/L (ref 21–32)
CREAT SERPL-MCNC: 0.96 MG/DL (ref 0.7–1.3)
D50 ADMINISTERED, D50ADM: 0 ML
D50 ORDER, D50ORD: 0 ML
ERYTHROCYTE [DISTWIDTH] IN BLOOD BY AUTOMATED COUNT: 15.4 % (ref 11.5–14.5)
GAS FLOW.O2 O2 DELIVERY SYS: ABNORMAL L/MIN
GAS FLOW.O2 SETTING OXYMISER: 2 L/M
GLOBULIN SER CALC-MCNC: 1.8 G/DL (ref 2–4)
GLSCOM COMMENTS: NORMAL
GLUCOSE BLD STRIP.AUTO-MCNC: 103 MG/DL (ref 65–100)
GLUCOSE BLD STRIP.AUTO-MCNC: 104 MG/DL (ref 65–100)
GLUCOSE BLD STRIP.AUTO-MCNC: 106 MG/DL (ref 65–100)
GLUCOSE BLD STRIP.AUTO-MCNC: 107 MG/DL (ref 65–100)
GLUCOSE BLD STRIP.AUTO-MCNC: 109 MG/DL (ref 65–100)
GLUCOSE BLD STRIP.AUTO-MCNC: 109 MG/DL (ref 65–100)
GLUCOSE BLD STRIP.AUTO-MCNC: 110 MG/DL (ref 65–100)
GLUCOSE BLD STRIP.AUTO-MCNC: 116 MG/DL (ref 65–100)
GLUCOSE BLD STRIP.AUTO-MCNC: 120 MG/DL (ref 65–100)
GLUCOSE BLD STRIP.AUTO-MCNC: 124 MG/DL (ref 65–100)
GLUCOSE BLD STRIP.AUTO-MCNC: 127 MG/DL (ref 65–100)
GLUCOSE BLD STRIP.AUTO-MCNC: 132 MG/DL (ref 65–100)
GLUCOSE BLD STRIP.AUTO-MCNC: 144 MG/DL (ref 65–100)
GLUCOSE BLD STRIP.AUTO-MCNC: 145 MG/DL (ref 65–100)
GLUCOSE BLD STRIP.AUTO-MCNC: 183 MG/DL (ref 65–100)
GLUCOSE BLD STRIP.AUTO-MCNC: 93 MG/DL (ref 65–100)
GLUCOSE BLD STRIP.AUTO-MCNC: 97 MG/DL (ref 65–100)
GLUCOSE SERPL-MCNC: 97 MG/DL (ref 65–100)
GLUCOSE, GLC: 103 MG/DL
GLUCOSE, GLC: 104 MG/DL
GLUCOSE, GLC: 106 MG/DL
GLUCOSE, GLC: 107 MG/DL
GLUCOSE, GLC: 109 MG/DL
GLUCOSE, GLC: 109 MG/DL
GLUCOSE, GLC: 110 MG/DL
GLUCOSE, GLC: 116 MG/DL
GLUCOSE, GLC: 120 MG/DL
GLUCOSE, GLC: 124 MG/DL
GLUCOSE, GLC: 127 MG/DL
GLUCOSE, GLC: 132 MG/DL
GLUCOSE, GLC: 144 MG/DL
GLUCOSE, GLC: 145 MG/DL
GLUCOSE, GLC: 183 MG/DL
GLUCOSE, GLC: 93 MG/DL
GLUCOSE, GLC: 97 MG/DL
HCO3 BLD-SCNC: 21.5 MMOL/L (ref 22–26)
HCT VFR BLD AUTO: 26.4 % (ref 36.6–50.3)
HGB BLD-MCNC: 8.3 G/DL (ref 12.1–17)
HIGH TARGET, HITG: 130 MG/DL
INSULIN ADMINSTERED, INSADM: 1.9 UNITS/HOUR
INSULIN ADMINSTERED, INSADM: 2.1 UNITS/HOUR
INSULIN ADMINSTERED, INSADM: 2.3 UNITS/HOUR
INSULIN ADMINSTERED, INSADM: 2.4 UNITS/HOUR
INSULIN ADMINSTERED, INSADM: 2.5 UNITS/HOUR
INSULIN ADMINSTERED, INSADM: 2.8 UNITS/HOUR
INSULIN ADMINSTERED, INSADM: 3 UNITS/HOUR
INSULIN ADMINSTERED, INSADM: 3 UNITS/HOUR
INSULIN ADMINSTERED, INSADM: 3.1 UNITS/HOUR
INSULIN ADMINSTERED, INSADM: 3.1 UNITS/HOUR
INSULIN ADMINSTERED, INSADM: 3.2 UNITS/HOUR
INSULIN ADMINSTERED, INSADM: 3.4 UNITS/HOUR
INSULIN ADMINSTERED, INSADM: 5 UNITS/HOUR
INSULIN ADMINSTERED, INSADM: 5.1 UNITS/HOUR
INSULIN ADMINSTERED, INSADM: 9.8 UNITS/HOUR
INSULIN BOLUS ADMINISTERED, INSBOLADM: 1 UNITS/HOUR
INSULIN BOLUS ADMINISTERED, INSBOLADM: 5 UNITS/HOUR
INSULIN BOLUS ORDERED, INSBOLORD: 1 UNITS/HOUR
INSULIN BOLUS ORDERED, INSBOLORD: 4.7 UNITS/HOUR
INSULIN ORDER, INSORD: 1.9 UNITS/HOUR
INSULIN ORDER, INSORD: 2.1 UNITS/HOUR
INSULIN ORDER, INSORD: 2.3 UNITS/HOUR
INSULIN ORDER, INSORD: 2.4 UNITS/HOUR
INSULIN ORDER, INSORD: 2.5 UNITS/HOUR
INSULIN ORDER, INSORD: 2.8 UNITS/HOUR
INSULIN ORDER, INSORD: 3 UNITS/HOUR
INSULIN ORDER, INSORD: 3 UNITS/HOUR
INSULIN ORDER, INSORD: 3.1 UNITS/HOUR
INSULIN ORDER, INSORD: 3.1 UNITS/HOUR
INSULIN ORDER, INSORD: 3.2 UNITS/HOUR
INSULIN ORDER, INSORD: 3.4 UNITS/HOUR
INSULIN ORDER, INSORD: 5 UNITS/HOUR
INSULIN ORDER, INSORD: 5.1 UNITS/HOUR
INSULIN ORDER, INSORD: 9.8 UNITS/HOUR
LOW TARGET, LOT: 95 MG/DL
MAGNESIUM SERPL-MCNC: 2.5 MG/DL (ref 1.6–2.4)
MCH RBC QN AUTO: 21.1 PG (ref 26–34)
MCHC RBC AUTO-ENTMCNC: 31.4 G/DL (ref 30–36.5)
MCV RBC AUTO: 67.2 FL (ref 80–99)
MINUTES UNTIL NEXT BG, NBG: 120 MIN
MINUTES UNTIL NEXT BG, NBG: 60 MIN
MULTIPLIER, MUL: 0.05
MULTIPLIER, MUL: 0.06
MULTIPLIER, MUL: 0.07
MULTIPLIER, MUL: 0.08
NRBC # BLD: 0 K/UL (ref 0–0.01)
NRBC BLD-RTO: 0 PER 100 WBC
ORDER INITIALS, ORDINIT: NORMAL
PCO2 BLD: 41.2 MMHG (ref 35–45)
PH BLD: 7.33 [PH] (ref 7.35–7.45)
PLATELET # BLD AUTO: 118 K/UL (ref 150–400)
PMV BLD AUTO: 10.9 FL (ref 8.9–12.9)
PO2 BLD: 33 MMHG (ref 80–100)
POTASSIUM SERPL-SCNC: 4.3 MMOL/L (ref 3.5–5.1)
PROT SERPL-MCNC: 5.3 G/DL (ref 6.4–8.2)
RBC # BLD AUTO: 3.93 M/UL (ref 4.1–5.7)
SAO2 % BLD: 59 % (ref 92–97)
SERVICE CMNT-IMP: ABNORMAL
SERVICE CMNT-IMP: NORMAL
SERVICE CMNT-IMP: NORMAL
SODIUM SERPL-SCNC: 143 MMOL/L (ref 136–145)
SPECIMEN TYPE: ABNORMAL
WBC # BLD AUTO: 8.2 K/UL (ref 4.1–11.1)

## 2021-04-27 PROCEDURE — 74011250636 HC RX REV CODE- 250/636: Performed by: NURSE PRACTITIONER

## 2021-04-27 PROCEDURE — P9045 ALBUMIN (HUMAN), 5%, 250 ML: HCPCS | Performed by: NURSE PRACTITIONER

## 2021-04-27 PROCEDURE — 83735 ASSAY OF MAGNESIUM: CPT

## 2021-04-27 PROCEDURE — 74011250637 HC RX REV CODE- 250/637: Performed by: NURSE PRACTITIONER

## 2021-04-27 PROCEDURE — 97535 SELF CARE MNGMENT TRAINING: CPT

## 2021-04-27 PROCEDURE — 74011000250 HC RX REV CODE- 250: Performed by: NURSE PRACTITIONER

## 2021-04-27 PROCEDURE — 74011000258 HC RX REV CODE- 258: Performed by: NURSE PRACTITIONER

## 2021-04-27 PROCEDURE — 2709999900 HC NON-CHARGEABLE SUPPLY

## 2021-04-27 PROCEDURE — 80053 COMPREHEN METABOLIC PANEL: CPT

## 2021-04-27 PROCEDURE — 97165 OT EVAL LOW COMPLEX 30 MIN: CPT

## 2021-04-27 PROCEDURE — 74011636637 HC RX REV CODE- 636/637: Performed by: NURSE PRACTITIONER

## 2021-04-27 PROCEDURE — P9047 ALBUMIN (HUMAN), 25%, 50ML: HCPCS | Performed by: NURSE PRACTITIONER

## 2021-04-27 PROCEDURE — 71045 X-RAY EXAM CHEST 1 VIEW: CPT

## 2021-04-27 PROCEDURE — 93005 ELECTROCARDIOGRAM TRACING: CPT

## 2021-04-27 PROCEDURE — 36415 COLL VENOUS BLD VENIPUNCTURE: CPT

## 2021-04-27 PROCEDURE — 82803 BLOOD GASES ANY COMBINATION: CPT

## 2021-04-27 PROCEDURE — 85027 COMPLETE CBC AUTOMATED: CPT

## 2021-04-27 PROCEDURE — 82962 GLUCOSE BLOOD TEST: CPT

## 2021-04-27 PROCEDURE — 65660000001 HC RM ICU INTERMED STEPDOWN

## 2021-04-27 PROCEDURE — 99232 SBSQ HOSP IP/OBS MODERATE 35: CPT | Performed by: CLINICAL NURSE SPECIALIST

## 2021-04-27 RX ORDER — ALBUMIN HUMAN 50 G/1000ML
12.5 SOLUTION INTRAVENOUS ONCE
Status: DISCONTINUED | OUTPATIENT
Start: 2021-04-27 | End: 2021-04-27

## 2021-04-27 RX ORDER — LANOLIN ALCOHOL/MO/W.PET/CERES
1 CREAM (GRAM) TOPICAL
Status: DISCONTINUED | OUTPATIENT
Start: 2021-04-28 | End: 2021-04-30 | Stop reason: HOSPADM

## 2021-04-27 RX ORDER — MORPHINE SULFATE 2 MG/ML
2 INJECTION, SOLUTION INTRAMUSCULAR; INTRAVENOUS
Status: DISCONTINUED | OUTPATIENT
Start: 2021-04-27 | End: 2021-04-30

## 2021-04-27 RX ORDER — ALBUMIN HUMAN 250 G/1000ML
12.5 SOLUTION INTRAVENOUS ONCE
Status: COMPLETED | OUTPATIENT
Start: 2021-04-27 | End: 2021-04-27

## 2021-04-27 RX ORDER — MORPHINE SULFATE 2 MG/ML
INJECTION, SOLUTION INTRAMUSCULAR; INTRAVENOUS
Status: DISPENSED
Start: 2021-04-27 | End: 2021-04-28

## 2021-04-27 RX ORDER — CLOPIDOGREL BISULFATE 75 MG/1
75 TABLET ORAL DAILY
Status: DISCONTINUED | OUTPATIENT
Start: 2021-04-27 | End: 2021-04-29

## 2021-04-27 RX ORDER — SODIUM CHLORIDE 0.9 % (FLUSH) 0.9 %
5-40 SYRINGE (ML) INJECTION AS NEEDED
Status: DISCONTINUED | OUTPATIENT
Start: 2021-04-27 | End: 2021-04-30 | Stop reason: HOSPADM

## 2021-04-27 RX ORDER — ALBUMIN HUMAN 50 G/1000ML
12.5 SOLUTION INTRAVENOUS ONCE
Status: COMPLETED | OUTPATIENT
Start: 2021-04-27 | End: 2021-04-27

## 2021-04-27 RX ORDER — SODIUM CHLORIDE 0.9 % (FLUSH) 0.9 %
5-40 SYRINGE (ML) INJECTION EVERY 8 HOURS
Status: DISCONTINUED | OUTPATIENT
Start: 2021-04-27 | End: 2021-04-30 | Stop reason: HOSPADM

## 2021-04-27 RX ORDER — FUROSEMIDE 10 MG/ML
20 INJECTION INTRAMUSCULAR; INTRAVENOUS ONCE
Status: COMPLETED | OUTPATIENT
Start: 2021-04-27 | End: 2021-04-27

## 2021-04-27 RX ORDER — ACETAMINOPHEN 325 MG/1
650 TABLET ORAL
Status: DISCONTINUED | OUTPATIENT
Start: 2021-04-28 | End: 2021-04-30 | Stop reason: HOSPADM

## 2021-04-27 RX ADMIN — FAMOTIDINE 20 MG: 20 TABLET ORAL at 20:41

## 2021-04-27 RX ADMIN — MUPIROCIN: 20 OINTMENT TOPICAL at 08:48

## 2021-04-27 RX ADMIN — POLYETHYLENE GLYCOL 3350 17 G: 17 POWDER, FOR SOLUTION ORAL at 08:43

## 2021-04-27 RX ADMIN — MORPHINE SULFATE 2 MG: 2 INJECTION, SOLUTION INTRAMUSCULAR; INTRAVENOUS at 13:26

## 2021-04-27 RX ADMIN — CEFAZOLIN SODIUM 2 G: 1 INJECTION, POWDER, FOR SOLUTION INTRAMUSCULAR; INTRAVENOUS at 23:13

## 2021-04-27 RX ADMIN — AMIODARONE HYDROCHLORIDE 1 MG/MIN: 50 INJECTION, SOLUTION INTRAVENOUS at 23:12

## 2021-04-27 RX ADMIN — SODIUM CHLORIDE 9 ML/HR: 9 INJECTION, SOLUTION INTRAVENOUS at 06:46

## 2021-04-27 RX ADMIN — CEFAZOLIN SODIUM 2 G: 1 INJECTION, POWDER, FOR SOLUTION INTRAMUSCULAR; INTRAVENOUS at 12:05

## 2021-04-27 RX ADMIN — ACETAMINOPHEN 650 MG: 325 TABLET, FILM COATED ORAL at 21:18

## 2021-04-27 RX ADMIN — Medication 10 ML: at 13:30

## 2021-04-27 RX ADMIN — ALBUMIN (HUMAN) 12.5 G: 12.5 INJECTION, SOLUTION INTRAVENOUS at 12:05

## 2021-04-27 RX ADMIN — ACETAMINOPHEN 650 MG: 325 TABLET, FILM COATED ORAL at 05:47

## 2021-04-27 RX ADMIN — INSULIN LISPRO 5 UNITS: 100 INJECTION, SOLUTION INTRAVENOUS; SUBCUTANEOUS at 18:09

## 2021-04-27 RX ADMIN — OXYCODONE 10 MG: 5 TABLET ORAL at 02:07

## 2021-04-27 RX ADMIN — MUPIROCIN: 20 OINTMENT TOPICAL at 17:44

## 2021-04-27 RX ADMIN — OXYCODONE 5 MG: 5 TABLET ORAL at 10:42

## 2021-04-27 RX ADMIN — ALBUMIN (HUMAN) 12.5 G: 12.5 INJECTION, SOLUTION INTRAVENOUS at 00:25

## 2021-04-27 RX ADMIN — CEFAZOLIN SODIUM 2 G: 1 INJECTION, POWDER, FOR SOLUTION INTRAMUSCULAR; INTRAVENOUS at 17:43

## 2021-04-27 RX ADMIN — ATORVASTATIN CALCIUM 40 MG: 40 TABLET, FILM COATED ORAL at 21:18

## 2021-04-27 RX ADMIN — AMIODARONE HYDROCHLORIDE 400 MG: 200 TABLET ORAL at 17:40

## 2021-04-27 RX ADMIN — SODIUM CHLORIDE 5.1 UNITS/HR: 9 INJECTION, SOLUTION INTRAVENOUS at 16:21

## 2021-04-27 RX ADMIN — DOCUSATE SODIUM 50 MG AND SENNOSIDES 8.6 MG 1 TABLET: 8.6; 5 TABLET, FILM COATED ORAL at 17:40

## 2021-04-27 RX ADMIN — OXYCODONE 10 MG: 5 TABLET ORAL at 06:41

## 2021-04-27 RX ADMIN — ALBUMIN (HUMAN) 12.5 G: 0.25 INJECTION, SOLUTION INTRAVENOUS at 17:17

## 2021-04-27 RX ADMIN — CHLORHEXIDINE GLUCONATE 10 ML: 1.2 RINSE ORAL at 20:41

## 2021-04-27 RX ADMIN — ACETAMINOPHEN 650 MG: 325 TABLET, FILM COATED ORAL at 01:06

## 2021-04-27 RX ADMIN — Medication 10 ML: at 23:16

## 2021-04-27 RX ADMIN — TAMSULOSIN HYDROCHLORIDE 0.4 MG: 0.4 CAPSULE ORAL at 08:43

## 2021-04-27 RX ADMIN — DOCUSATE SODIUM 50 MG AND SENNOSIDES 8.6 MG 1 TABLET: 8.6; 5 TABLET, FILM COATED ORAL at 08:43

## 2021-04-27 RX ADMIN — AMIODARONE HYDROCHLORIDE 1 MG/MIN: 50 INJECTION, SOLUTION INTRAVENOUS at 16:55

## 2021-04-27 RX ADMIN — CHLORHEXIDINE GLUCONATE 10 ML: 1.2 RINSE ORAL at 08:48

## 2021-04-27 RX ADMIN — Medication 10 ML: at 05:30

## 2021-04-27 RX ADMIN — CLOPIDOGREL BISULFATE 75 MG: 75 TABLET ORAL at 13:22

## 2021-04-27 RX ADMIN — ACETAMINOPHEN 650 MG: 325 TABLET, FILM COATED ORAL at 09:26

## 2021-04-27 RX ADMIN — ACETAMINOPHEN 650 MG: 325 TABLET, FILM COATED ORAL at 13:22

## 2021-04-27 RX ADMIN — OXYCODONE 10 MG: 5 TABLET ORAL at 17:07

## 2021-04-27 RX ADMIN — BACITRACIN 1 PACKET: 500 OINTMENT TOPICAL at 11:58

## 2021-04-27 RX ADMIN — DEXTROSE MONOHYDRATE 150 MG: 50 INJECTION, SOLUTION INTRAVENOUS at 13:51

## 2021-04-27 RX ADMIN — AMIODARONE HYDROCHLORIDE 1 MG/MIN: 50 INJECTION, SOLUTION INTRAVENOUS at 09:46

## 2021-04-27 RX ADMIN — ASPIRIN 81 MG: 81 TABLET, CHEWABLE ORAL at 08:43

## 2021-04-27 RX ADMIN — INSULIN LISPRO 1 UNITS: 100 INJECTION, SOLUTION INTRAVENOUS; SUBCUTANEOUS at 09:26

## 2021-04-27 RX ADMIN — FAMOTIDINE 20 MG: 20 TABLET ORAL at 06:36

## 2021-04-27 RX ADMIN — AMIODARONE HYDROCHLORIDE 1 MG/MIN: 50 INJECTION, SOLUTION INTRAVENOUS at 03:14

## 2021-04-27 RX ADMIN — ACETAMINOPHEN 650 MG: 325 TABLET, FILM COATED ORAL at 17:40

## 2021-04-27 RX ADMIN — OXYCODONE 10 MG: 5 TABLET ORAL at 21:18

## 2021-04-27 RX ADMIN — CEFAZOLIN SODIUM 2 G: 1 INJECTION, POWDER, FOR SOLUTION INTRAMUSCULAR; INTRAVENOUS at 05:31

## 2021-04-27 RX ADMIN — Medication 400 MG: at 17:40

## 2021-04-27 RX ADMIN — ALBUMIN (HUMAN) 12.5 G: 12.5 INJECTION, SOLUTION INTRAVENOUS at 05:45

## 2021-04-27 RX ADMIN — AMIODARONE HYDROCHLORIDE 1 MG/MIN: 50 INJECTION, SOLUTION INTRAVENOUS at 14:09

## 2021-04-27 RX ADMIN — FUROSEMIDE 20 MG: 10 INJECTION, SOLUTION INTRAMUSCULAR; INTRAVENOUS at 18:09

## 2021-04-27 RX ADMIN — SODIUM CHLORIDE 2.5 MG/HR: 9 INJECTION, SOLUTION INTRAVENOUS at 01:18

## 2021-04-27 NOTE — ANESTHESIA POSTPROCEDURE EVALUATION
Procedure(s):  ON PUMP CORONARY ARTERY BYPASS GRAFT X 5  WITH LIMA, LEFT AND RIGHT SAPHENOUS VEIN HARVEST VIA EVH, NICK AND EPIAORTIC ULTRASOUND BY DR CULLEN [facility]. general    Anesthesia Post Evaluation        Patient location during evaluation: PACU  Note status: Adequate. Level of consciousness: responsive to verbal stimuli and sleepy but conscious  Pain management: satisfactory to patient  Airway patency: patent  Anesthetic complications: no  Cardiovascular status: acceptable  Respiratory status: acceptable  Hydration status: acceptable  Comments: +Post-Anesthesia Evaluation and Assessment    Patient: Jannie Alfaro MRN: [id number]  SSN: [id number]   YOB: 1950  Age: 69 y.o. Sex: male          Cardiovascular Function/Vital Signs    BP (!) 146/79 (BP 1 Location: Left arm, BP Patient Position: Sitting)   Pulse 66   Temp 37.6 °C (99.7 °F)   Resp 19   Ht 5' 3.75\" (1.619 m)   Wt 74.8 kg (164 lb 14.4 oz)   SpO2 93%   BMI 28.53 kg/m²     Patient is status post Procedure(s):  ON PUMP CORONARY ARTERY BYPASS GRAFT X 5  WITH LIMA, LEFT AND RIGHT SAPHENOUS VEIN HARVEST VIA EVH, NICK AND EPIAORTIC ULTRASOUND BY DR CULLEN [facility]. Nausea/Vomiting: Controlled. Postoperative hydration reviewed and adequate. Pain:  Pain Scale 1: Numeric (0 - 10) (04/27/21 1326)  Pain Intensity 1: 9 (04/27/21 1326)   Managed. Neurological Status:   Neuro (WDL): Within Defined Limits (04/26/21 0720)   At baseline. Mental Status and Level of Consciousness: Arousable. Pulmonary Status:   O2 Device: Nasal cannula (04/27/21 0800)   Adequate oxygenation and airway patent. Complications related to anesthesia: None    Post-anesthesia assessment completed. No concerns. I have evaluated the patient and the patient is stable and ready to be discharged from PACU .     Signed By: Lyle Holguin MD    4/27/2021        INITIAL Post-op Vital signs:   Vitals Value Taken Time   /87 04/27/21 1341   Temp 37.6 °C (99.7 °F) 04/27/21 1200   Pulse 111 04/27/21 1343   Resp 21 04/27/21 1343   SpO2 92 % 04/27/21 1343   Vitals shown include unvalidated device data.

## 2021-04-27 NOTE — PROGRESS NOTES
0800-Report received from [facility]. This RN  assumed patient care. 1000-OT at bedside. 1055-Patient returned to bed. 1130-NP notified of patient low UOx2 hours. Order for Albumin 5% 12.5mg given. 1140-[place] catheter removed per NP.   1210-R Arterial line removed per NP. NIBP correlates. 1315-Patient back up to chair for lunch. 1338-Patient went into Afib with rate ranging from 90s-130s. 1345-Pacemaker placed on VVI 50/10.   1351-Amiodarone bolus given. Then Amio gtt dose changed back to 1.   1424-Patient converted back to NSR in 60s. 1440-20g PIV placed in R hand. 1500-R CVC line removed per NP.   1630-NP notified of patient low UOx2 hours. Orders for albumin 25% 12.5mg and then 20mg IV lasix. 1740-PO Amio dose given. 2000- Bedside and Verbal shift change report given to ROYA Trinidad (oncoming nurse) by Christine Short and ROYA Nam (offgoing nurse). Report included the following information SBAR, Kardex, OR Summary, Procedure Summary, Intake/Output, MAR, Accordion, Recent Results, Cardiac Rhythm NSR and Quality Measures. Shift Summary: Patient OOBx3. Remains in NSR since converting at 1424.

## 2021-04-27 NOTE — PROGRESS NOTES
2000: Report received from Debi Lopez RN & Lili Breen RN. Amio (1), liz (20), insulin, MIV (10), and pressure bag (9) drips verified. Pt in bed, resting, arouses to voice - Ox4, and on 4L NC sating 100%. Plan to wean oxygen as tolerated. Goal SBP <130, MAP >65, and CI >2.     2028: Pt sating 98% w/ SvO2 58, weaned to 3L NC.    2040: Passed stand, PRN rafa 10 mg for 8/10 CP. 2043: Restarted dex drip (0.1) for pain control overnight. 2052: MAP 92, started weaning liz drip (10). 2145: Pt verbalizing sharp shooting CP, PRN morphine 4 mg given. 2302: MAP 78, SVR 1100s - liz drip stopped. 0025: MAP 75, off liz, PAD 12, CVP 9, UOP 40ish, and CI 2.2 - PRN albumin given. 0118: MAP 78, CI 1.8, SVR >1200 - started cardene drip (2.5). 0200: CI 3, improved from 1.8. . Pt sating 95%, weaned to 2L NC.    0204: , MAP 70 - started weaning cardene drip (1.5).    0207: Pt verbalizing CP post turn, PRN rafa 10 mg given. 2796-2997: MAP 66-67, SVR 700s, CI 3.3 - attempted to pace, inappropriately A-pacing - weaned cardene (1) and restarted liz (5).    0300:  w/ 1 cardene and 5 liz. 0345: SvO2 59 , updated in hemodynamic monitor. 0514: SVR 1600s w/ CI 1.7 - increased cardene drip (2).    0521: MAP 75, SVR 1700s - liz off and cardene back to 1.    0528: MAP 71, CI 1.6, SVR 1600s - cardene back to 2.    0529: CI 1.7    0531: CI 1.9    0538: CI 2.3, MAP 65 - weaned cardene to 1.5.    0543: CI 2.9, MAP 68    0545: MAP 65, CI 3.3, CVP 9-11, PAD 11-12, UOP <30 last hour - PRN albumin given. 1547: MAP 69, , CI 3.3 - weaned cardene to 1.     0600: , CI 2.9, MAP 65-71 w/ cardene at 1.    0615: MAP 65, CI 2.9, , CVP 11-12 - cardene stopped. 8336: Pt vebalizing 8/10 CP - PRN rafa 10 mg given. 0800: Bedside and Verbal shift change report given to Viv BOX RN & Edel Laws RN.  Report included the following information SBAR, Intake/Output, MAR, Recent Results, Cardiac Rhythm NSR-SB and Alarm Parameters .

## 2021-04-27 NOTE — PROGRESS NOTES
[facility]    ICU TEAM Consult    Name: Ricardo Rondon   : 1950   MRN: [id number]   Date: 2021      Reason for ICU Admission: Multivessel coronary artery disease status post CABG x5 on     HPI:  History obtained from medical record. This is a 71-year-old gentleman 18-pack-year smoking who had previous history of coronary artery disease, recent CVA affecting visual field, hypertension, thalassemia trait and dyslipidemia who was admitted today directly to undergo CABG by Dr. Andreas Tran. Initially patient was referred from [facility] in 2021 with ongoing chest pain on exertion was found to have multi vessel disease and referred for CABG. However he was found to have acute CVA as evidence with changes in his visual field and abnormal small vessels on MRI. He was discharged home with a plan for outpatient follow-up and admission in the future for CABG. Active Problem List:     Problem List  Date Reviewed: 2021          Codes Class    CAD (coronary artery disease) ICD-10-CM: I25.10  ICD-9-CM: 414.00         S/P CABG x 5 ICD-10-CM: Z95.1  ICD-9-CM: V45.81     Overview Signed 2021  1:30 PM by Lexie Castillo PA-C     CABG X 5 LIMA to LAD, RSVG to PDA, Sequential RSVG to Diag, OM1, OM2  LEFT AND RIGHT ENDOSCOPIC SAPHENOUS VEIN HARVEST             Acute CVA (cerebrovascular accident) ([facility] [address] ICD-10-CM: I63.9  ICD-9-CM: 434.91         Unstable angina (HCC) ICD-10-CM: I20.0  ICD-9-CM: 411.1               Past Medical History:      has a past medical history of CAD (coronary artery disease), Chronic lower back pain, CVA (cerebral vascular accident) ([facility] [address] HLD (hyperlipidemia), HTN (hypertension), Kidney stones, and Thalassemia trait. Past Surgical History:      has a past surgical history that includes hx hernia repair (Right) and hx prostate surgery.     Home Medications:     Prior to Admission medications    Medication Sig Start Date End Date Taking? Authorizing Provider   isosorbide mononitrate ER (IMDUR) 30 mg tablet Take 15 mg by mouth daily. Yes Provider, Historical   mupirocin (BACTROBAN) 2 % ointment by Both Nostrils route two (2) times a day for 2 days. 21 Yes Osman Moe NP   chlorhexidine (Peridex) 0.12 % solution 15 mL by Swish and Spit route every twelve (12) hours for 2 days. 21 Yes Osman Moe NP   alfuzosin SR (UROXATRAL) 10 mg SR tablet Take 1 Tab by mouth nightly. Indications: enlarged prostate with urination problem 3/25/21  Yes Ean Rubio NP   atorvastatin (LIPITOR) 40 mg tablet Take 1 Tab by mouth daily. 3/8/21  Yes Natalia MACK NP   aspirin delayed-release 81 mg tablet Take 81 mg by mouth daily. Yes Provider, Historical   metoprolol tartrate (LOPRESSOR) 25 mg tablet Take 25 mg by mouth two (2) times a day. Yes Provider, Historical   nitroglycerin (NITROSTAT) 0.4 mg SL tablet 1 Tab by SubLINGual route every five (5) minutes as needed for Chest Pain. Up to 3 doses. Indications: acute attack of angina 3/16/21   Natalia MACK NP   clopidogreL (PLAVIX) 75 mg tab Take 1 Tab by mouth daily.  3/8/21   Ean Rubio NP       Allergies/Social/Family History:     No Known Allergies   Social History     Tobacco Use    Smoking status: Former Smoker     Packs/day: 0.50     Years: 36.00     Pack years: 18.00     Types: Cigarettes     Quit date: 2002     Years since quittin.2    Smokeless tobacco: Never Used   Substance Use Topics    Alcohol use: Not on file      Family History   Problem Relation Age of Onset    Alzheimer Mother     Heart Disease Father     Heart Surgery Father        Review of Systems:     Not able to obtain due to patient medical condition    Objective:   Vital Signs:  Visit Vitals  /78   Pulse 65   Temp 99.4 °F (37.4 °C)   Resp 14   Ht 5' 3.75\" (1.619 m)   Wt 74.8 kg (164 lb 14.4 oz)   SpO2 95%   BMI 28.53 kg/m²    O2 Flow Rate (L/min): 2 l/min O2 Device: Nasal cannula Temp (24hrs), Av.2 °F (37.3 °C), Min:97.1 °F (36.2 °C), Max:100.1 °F (37.8 °C)    CVP (mmHg): 16 mmHg (21 1100)      Intake/Output:     Intake/Output Summary (Last 24 hours) at 2021 1241  Last data filed at 2021 1100  Gross per 24 hour   Intake 2199.16 ml   Output 1672 ml   Net 527.16 ml       Physical Exam:    General:  Appropriate, pleasant    Eyes:  Sclera anicteric. Pupils equally round and reactive to light. Mouth/Throat: Mucous membranes normal   Neck: Supple   Lungs:   Clear to auscultation bilaterally, good effort, drains in place   CV:  Regular rate and rhythm,no murmur, click, rub or gallop   Abdomen:   Soft, non-tender. bowel sounds normal. non-distended   Extremities: No cyanosis or edema   Skin: Skin color, texture, turgor normal. no acute rash or lesions   Lymph nodes: Cervical and supraclavicular normal   Musculoskeletal: No swelling or deformity   Lines/Devices:  Intact, no erythema, drainage or tenderness   Neuro: Non-focal       LABS AND  DATA: Personally reviewed  Recent Labs     21  0311 21  1755 21  1442   WBC 8.2  --  7.6   HGB 8.3* 9.9* 9.4*   HCT 26.4* 32.3* 30.4*   *  --  125*     Recent Labs     21  03121  1755    144   K 4.3 4.1   * 115*   CO2 23 22   BUN 20 18   CREA 0.96 0.97   GLU 97 118*   CA 7.6* 7.6*   MG 2.5* 2.7*     Recent Labs     21  0311 21  1755   AP 35* 46   TP 5.3* 5.6*   ALB 3.5 3.6   GLOB 1.8* 2.0     Recent Labs     21  1442   INR 1.2*   PTP 12.1*   APTT 26.1      Recent Labs     21  0348 21  1808 21  1456 21  1456 21  1450   PHI 7.33* 7.35   < > 7.28* 7.32*   PCO2I 41.2 36.8   < > 50.5* 43.8   PO2I 33* 110*   < > 43* 166*   FIO2I  --   --   --  0.80 80    < > = values in this interval not displayed. No results for input(s): CPK, CKMB, TROIQ, BNPP in the last 72 hours.     Hemodynamics:   PAP: PAP Systolic: 37 (04/27/21 1100) CO: CO (l/min): 5.2 l/min (04/27/21 1100)   Wedge:   CI: CI (l/min/m2): 2.9 l/min/m2 (04/27/21 1100)   CVP:  CVP (mmHg): 16 mmHg (04/27/21 1100) SVR: BP 2: 140/83 (04/26/21 0615)     PVR:       Ventilator Settings:  Mode Rate Tidal Volume Pressure FiO2 PEEP   Spontaneous   470 ml  5 cm H2O 80 % 5 cm H20     Peak airway pressure: 18 cm H2O    Minute ventilation: 7.66 l/min        MEDS: Reviewed    Chest X-Ray:  CXR Results  (Last 48 hours)               04/27/21 0456  XR CHEST PORT Final result    Impression:  Interval diminished lung volumes with right basilar atelectasis and effusion. ET tube and NG tube have been removed. Narrative:  Clinical history: postop heart   INDICATION:   postop heart   COMPARISON: 4/26/2021       FINDINGS:   AP portable upright view of the chest demonstrates a stable  cardiopericardial   silhouette. ET tube and NG tube have been removed. Mediastinal pleural drains   remain. Pulmonary artery catheter is in place. Interval right basilar   atelectasis and effusion. Priyank Cathi Priyank Cathi There is no pneumothorax. . Patient is on a cardiac   monitor. 04/26/21 1417  XR CHEST PORT Final result    Impression:  Life-support lines and tubes as described above. Bilateral lower   lobe atelectasis following CABG procedure. Narrative: Indication: Status post CABG. Comparison to 4/20/2021. Portable exam obtained at 2466 demonstrates placement   of a right IJ [place]-Nilda catheter with the tip projecting over the proximal right   pulmonary artery. ET tube has been placed with the tip projecting above the   thoracic inlet. NG tube has been placed with the tip projecting over the fundus. Mediastinal drain and left-sided chest tube project in satisfactory position. The patient is status post CABG procedure. Bilateral lower lobe atelectasis is   noted. There is no pneumothorax.                  ECHO:  · Saline contrast was given to evaluate for intracardiac shunt.  · LV: Estimated LVEF is 55 - 60%. Normal cavity size, wall thickness and systolic function (ejection fraction normal). Wall motion: normal.      Echo Findings     Left Ventricle Normal cavity size, wall thickness and systolic function (ejection fraction normal). Wall motion: normal. The estimated EF is 55 - 60%. Left Atrium Normal cavity size. Right Ventricle Normal cavity size, wall thickness and global systolic function. Right Atrium Normal cavity size. Interatrial Septum No interatrial shunt visualized on color doppler. Aortic Valve Normal valve structure, no stenosis and no regurgitation. Mitral Valve Normal valve structure, no stenosis and no regurgitation. Tricuspid Valve Normal valve structure, no stenosis and no regurgitation. Pulmonic Valve Pulmonic valve not well visualized, but normal doppler findings. No stenosis. Aorta Normal aortic root. Pulmonary Artery Normal pulmonary arteries. IVC/Hepatic Veins Normal structure. Pericardium No evidence of pericardial effusion.              Assessment and Plan:   Coronary artery disease status post CABG x 5 on April 26  - Acute Postoperative Pain  - Atelectasis  Hypertension  Dyslipidemia  Thalassemia trait:    Optimize Pain Control  Incentive Spirometry  ASA/Statin/Plavix  OOB to chair  Aggressive PT/OT    CRITICAL CARE CONSULTANT NOTE  I had a face to face encounter with the patient, reviewed and interpreted patient data including clinical events, labs, images, vital signs, I/O's, and examined patient. I have discussed the case and the plan and management of the patient's care with the consulting services, the bedside nurses and the respiratory therapist.      NOTE OF PERSONAL INVOLVEMENT IN CARE   This patient has a high probability of imminent, clinically significant deterioration, which requires the highest level of preparedness to intervene urgently.  I participated in the decision-making and personally managed or directed the management of the following life and organ supporting interventions that required my frequent assessment to treat or prevent imminent deterioration. I personally spent 30 minutes of critical care time. This is time spent at this critically ill patient's bedside actively involved in patient care as well as the coordination of care and discussions with the patient's family. This does not include any procedural time which has been billed separately.     Giovanna Schmid,    Staff Intensivist/Anesthesiologist  Critical Care Medicine

## 2021-04-27 NOTE — PROGRESS NOTES
I have read and agree with [facility] RN documentation and care. I have reviewed the MAR and all flow sheets associated with Patient's care today.

## 2021-04-27 NOTE — CARDIO/PULMONARY
Cardiac Rehab: CABG education folder at bedside. Met with Katelin Skill to begin cardiac surgery post discharge instructions and to discuss participation in the Cardiac Rehab Program.     Educated using teach back method. Reviewed the use of bear for sternal support, daily weight and temperature monitoring,  and use of incentive spirometer. Katelin Skill was able to demonstrate proper use of incentive spirometer, achieving 1500 ml. per his bedside nurse. Discussed Cardiac Rehab Program format, benefits, and encouraged participation. Verified with the pt., that the location of choice for the OP program was [facility] and contact information now on AVS.. General questions answered. Katelin Skill verbalized understanding.         Ghislaine Styles RN

## 2021-04-27 NOTE — PROGRESS NOTES
\[facility] [place] [facility]\"" ICU Progress Note    Admit Date: 2021  POD:  1 Day Post-Op    Procedure:  Procedure(s):  ON PUMP CORONARY ARTERY BYPASS GRAFT X 5  WITH LIMA, LEFT AND RIGHT SAPHENOUS VEIN HARVEST VIA EVH, NICK AND EPIAORTIC ULTRASOUND BY DR CULLEN [facility]        Subjective:   Pt seen with Dr. Cristal Lehman. Pt up in chair, no complaints, on/off liz and cardene overnight, now both off. On amio/insulin gtts. T max 100.1F, NC 2L     Objective:   Vitals:  Blood pressure 114/73, pulse 61, temperature 99.5 °F (37.5 °C), resp. rate 17, height 5' 3.75\" (1.619 m), weight 164 lb 14.4 oz (74.8 kg), SpO2 96 %. Temp (24hrs), Av.2 °F (37.3 °C), Min:97.1 °F (36.2 °C), Max:100.1 °F (37.8 °C)    Hemodynamics:   CO: CO (l/min): 5 l/min   CI: CI (l/min/m2): 2.8 l/min/m2   CVP: CVP (mmHg): 13 mmHg (21 0700)   SVR: SVR (dyne*sec)/cm5: 848 (dyne*sec)/cm5 (21 8298)   PAP Systolic: PAP Systolic: 31 (29/01/10 0954)   PAP Diastolic: PAP Diastolic: 14 (28/39 3171)   PVR:     SV02: SVO2 (%): 57 % (21 0700)   SCV02:      EKG/Rhythm: SR in 60's     Extubation Date / Time: 21 at 1815    CT Output: +690 ml     Ventilator:  Ventilator Volumes  Vt Set (ml): 470 ml (21 1351)  Vt Exhaled (Machine Breath) (ml): 479 ml (21 1630)  Vt Spont (ml): 461 ml (21 1351)  Ve Observed (l/min): 7.66 l/min (21 1630)    Oxygen Therapy:  Oxygen Therapy  O2 Sat (%): 96 % (21 0700)  Pulse via Oximetry: 53 beats per minute (21 0615)  O2 Device: Nasal cannula (21 0400)  Skin Assessment: Clean, dry, & intact (21 1400)  O2 Flow Rate (L/min): 2 l/min (21 0400)  FIO2 (%): 80 % (21 1400)    CXR:   CXR Results  (Last 48 hours)               21 0456  XR CHEST PORT Final result    Impression:  Interval diminished lung volumes with right basilar atelectasis and effusion. ET tube and NG tube have been removed.                 Narrative:  Clinical history: postop heart   INDICATION:   postop heart COMPARISON: 4/26/2021       FINDINGS:   AP portable upright view of the chest demonstrates a stable  cardiopericardial   silhouette. ET tube and NG tube have been removed. Mediastinal pleural drains   remain. Pulmonary artery catheter is in place. Interval right basilar   atelectasis and effusion. Meenakshi Beck There is no pneumothorax. . Patient is on a cardiac   monitor. 04/26/21 1417  XR CHEST PORT Final result    Impression:  Life-support lines and tubes as described above. Bilateral lower   lobe atelectasis following CABG procedure. Narrative: Indication: Status post CABG. Comparison to 4/20/2021. Portable exam obtained at 8166 demonstrates placement   of a right IJ [place]-Nilda catheter with the tip projecting over the proximal right   pulmonary artery. ET tube has been placed with the tip projecting above the   thoracic inlet. NG tube has been placed with the tip projecting over the fundus. Mediastinal drain and left-sided chest tube project in satisfactory position. The patient is status post CABG procedure. Bilateral lower lobe atelectasis is   noted. There is no pneumothorax. Admission Weight: Last Weight   Weight: 160 lb 15 oz (73 kg) Weight: 164 lb 14.4 oz (74.8 kg)     Intake / Output / Drain:  Current Shift: No intake/output data recorded. Last 24 hrs.:     Intake/Output Summary (Last 24 hours) at 4/27/2021 0739  Last data filed at 4/27/2021 0700  Gross per 24 hour   Intake 2199.16 ml   Output 2037 ml   Net 162.16 ml       EXAM:  General:  Pleasant, no distress                                                                                            Lungs:   Diminished bases bilat, R>L   Incision:  Dsg C/D/I   Heart:  Regular rate and rhythm, S1, S2 normal, no murmur, click, or gallop. +rub   Abdomen:   Soft, non-tender. Bowel sounds hypoactive. No masses,  No organomegaly. Extremities:  No edema.  Palpable pulses bilat    Neurologic:  Gross motor and sensory apparatus intact. Labs:   Recent Labs     21  0700 21  0311 21  0311 21  1442 21  1442   WBC  --   --  8.2  --  7.6   HGB  --   --  8.3*   < > 9.4*   HCT  --   --  26.4*   < > 30.4*   PLT  --   --  118*  --  125*   NA  --   --  143   < > 144   K  --   --  4.3   < > 3.8   BUN  --   --  20   < > 16   CREA  --   --  0.96   < > 1.03   GLU  --   --  97   < > 127*   GLUCPOC 110*   < > 116*   < > 135*   INR  --   --   --   --  1.2*    < > = values in this interval not displayed. Assessment:     Active Problems:    CAD (coronary artery disease) (3/5/2021)      S/P CABG x 5 (2021)      Overview: CABG X 5 LIMA to LAD, RSVG to PDA, Sequential RSVG to Diag, OM1, OM2      LEFT AND RIGHT ENDOSCOPIC SAPHENOUS VEIN HARVEST       Plan/Recommendations/Medical Decision Makin. S/p CABG: on ASA, statin. Hold BB until BP can tolerate  2. Atelectasis, small R effusion: increase IS use and activity, wean O2 to sats > 92%. Hold diuresis today  3. Hypertension: hold meds until BP improves. HR chronically low per pt - in high 40/50's preop  4. Hyperlipidemia: resume statin  5. CVA with visual field deficits in March: Multiple small infarcts seen on MRI. On asa, statin, plavix preop, resume today. Carotids ok. 6. BPH: improved since last visit. Had urolift in past. Had urology FU on , back on Uroxatral, sub flomax until family can bring med in. Keep Gregory 1 more day  7. Thalassemia trait: pt reports hx of and often times Hgb on lower side, repeating CBC today  8. Acute postop anemia: Hgb 8.3, trend and monitor CT ouput. Start iron once diet advanced, also w/ Thalassemia trait per above    Dispo: PT/OT eval. De line and transfer to floor    Update: pt into a-fib, rate 110 - 120's at times. Give amio bolus, increase gtt back up to 1 mg. BP stable.      Signed By: Harper Borden, NP

## 2021-04-27 NOTE — PROGRESS NOTES
Problem: Self Care Deficits Care Plan (Adult)  Goal: *Acute Goals and Plan of Care (Insert Text)  Description: FUNCTIONAL STATUS PRIOR TO ADMISSION: Patient was independent and active without use of DME. Patient was active in his community playing pickle ball and walking several miles with his wife however has not been as active since 3/2021 with CVA and recent admission for CABG workup with CABG postponed due to neuro recommendations and HR recommendation of <100 bpm (per patient's wife). HOME SUPPORT: The patient lived with wife but did not require assist.    Occupational Therapy Goals  Initiated 4/27/2021  1. Patient will perform ADLs standing 5 mins without fatigue or LOB with supervision/set-up within 5 day(s). 2.  Patient will perform lower body ADLs with supervision/set-up within 5 day(s). 3.  Patient will perform gathering ADL items high and low 2/2 with supervision/set-up within 5 day(s). 4.  Patient will perform toilet transfers with supervision/set-up within 5 day(s). 5.  Patient will perform all aspects of toileting with supervision/set-up within 5 day(s). 6.  Patient will participate in cardiac/sternal upper extremity therapeutic exercise/activities to increase independence with ADLs with supervision/set-up for 5 minutes within 5 day(s).        Outcome: Progressing Towards Goal   OCCUPATIONAL THERAPY EVALUATION  Patient: Faye Molina (64 y.o. male)  Date: 4/27/2021  Primary Diagnosis: CAD (coronary artery disease) [I25.10]  Procedure(s) (LRB):  ON PUMP CORONARY ARTERY BYPASS GRAFT X 5  WITH LIMA, LEFT AND RIGHT SAPHENOUS VEIN HARVEST VIA EVH, NICK AND EPIAORTIC ULTRASOUND BY DR CULLEN [facility] (N/A) 1 Day Post-Op   Precautions:  Fall, Sternal(\"move in the tube\")    ASSESSMENT  Based on the objective data described below, the patient presents with generalized weakness, decreased endurance (2L NC O2), decreased activity tolerance, and decreased insight into deficits s/p CABG x 5 POD 1 with newly acquired cardiac precautions. Patient was recently evaluated and discharged by OT 3/8/2021 and CABG postponed due to acute CVA. However PTA, patient was active and independent with all ADL and IADL tasks with support from his wife. Patient receptive to education on 6/6 BUE cardiac exercises, energy conservation strategies, and compensatory strategies to be used for ADLs post-op. Anticipate no OT needs at discharge. Patient is verbalizing and is demonstrating understanding of mindful-based movements (\"move in the tube\") principles of keeping UEs proximal to ribcage to prevent lateral pull on the sternum during load-bearing activities with verbal cues required for compliance. Current Level of Function Impacting Discharge (ADLs/self-care): largely MOD A for LB ADLs    Functional Outcome Measure: The patient scored 40/100 on the Barthel Index outcome measure which is indicative of 60% ADL impairment. Other factors to consider for discharge: O2 needs     Patient will benefit from skilled therapy intervention to address the above noted impairments. PLAN :  Recommendations and Planned Interventions: self care training, functional mobility training, therapeutic exercise, balance training, therapeutic activities, endurance activities, patient education, home safety training, and family training/education    Frequency/Duration: Patient will be followed by occupational therapy 5 times a week to address goals. Recommendation for discharge: (in order for the patient to meet his/her long term goals)  No skilled occupational therapy/ follow up rehabilitation needs identified at this time. This discharge recommendation:  Has not yet been discussed the attending provider and/or case management    IF patient discharges home will need the following DME: none       SUBJECTIVE:   Patient stated I still see a little black paisley and now a black dot in both eyes.     OBJECTIVE DATA SUMMARY:   HISTORY:   Past Medical History:   Diagnosis Date    CAD (coronary artery disease)     Chronic lower back pain     CVA (cerebral vascular accident) ([facility] [address]     HLD (hyperlipidemia)     HTN (hypertension)     Kidney stones     Thalassemia trait      Past Surgical History:   Procedure Laterality Date    HX HERNIA REPAIR Right     HX PROSTATE SURGERY      \"Urolift\"       Expanded or extensive additional review of patient history:     Home Situation  Home Environment: Private residence(in 55+ community)  # Steps to Enter: 1  One/Two Story Residence: Two story, live on 1st floor  Living Alone: No  Support Systems: Spouse/Significant Other/Partner  Patient Expects to be Discharged to[de-identified] Private residence  Current DME Used/Available at Home: Raised toilet seat, Shower chair, Grab bars, Lift chair  Tub or Shower Type: Shower    Hand dominance: Right    EXAMINATION OF PERFORMANCE DEFICITS:  Cognitive/Behavioral Status:  Neurologic State: Alert  Orientation Level: Appropriate for age  Cognition: Appropriate for age attention/concentration  Perception: Appears intact  Perseveration: No perseveration noted  Safety/Judgement: Decreased insight into deficits    Skin: temporary pacer; chest tubes; [place]; mckeon cath; art line; additional lines & leads    Edema: none noted    Hearing: Auditory  Auditory Impairment: None    Vision/Perceptual:                           Acuity: (visual deficits from recent CVA (black spots))         Range of Motion:  BUE  AROM: Generally decreased, functional  PROM: Generally decreased, functional                      Strength:  BUE  Strength: Generally decreased, functional                Coordination:  Coordination: Generally decreased, functional  Fine Motor Skills-Upper: Left Intact; Right Intact    Gross Motor Skills-Upper: Left Intact; Right Intact    Tone & Sensation:  BUE  Tone: Normal  Sensation: Impaired(reports some numbness in fingertips)                      Balance:  Sitting: Intact; Without support  Standing: Impaired; Without support  Standing - Static: Fair  Standing - Dynamic : Fair    Functional Mobility and Transfers for ADLs:  Bed Mobility:  Sit to Supine: Moderate assistance;Assist x1;Additional time  Scooting: Contact guard assistance;Assist x1;Additional time    Transfers:  Sit to Stand: Contact guard assistance;Assist x1;Additional time  Stand to Sit: Contact guard assistance;Assist x1;Additional time    ADL Assessment:  Feeding: Setup;Supervision(infer 2* lines and leads )    Oral Facial Hygiene/Grooming: Contact guard assistance(infer standing at the sink )    Bathing: Moderate assistance(infer A for bilateral lower legs)    Upper Body Dressing: Setup;Supervision(infer cues for bimanual overhead reaching)    Lower Body Dressing: Moderate assistance(infer A for socks/shoes)    Toileting: Moderate assistance(mckeon cath; infer A for pericare)                ADL Intervention and task modifications:                                     Cognitive Retraining  Safety/Judgement: Decreased insight into deficits    Patient instructed and educated on mindful movement principles based on Move in The Tube concept to include maintaining bilateral elbows close to rib cage when performing any load-bearing activity such as getting in/out of bed, pushing up from a chair, opening a door, or lifting a box. Patient was given a handout with diagrams of each correct/incorrect method of performing each of the above tasks. Patient instructed on the ability to utilize upper extremities outside the tube when doing any non-load bearing activity such as washing hair/body, brushing teeth, retrieving clothing items, or scratching your back. Patient encouraged to also perform upper extremity exercises \"outside of the tube\" to prevent scar tissue formation around sternal incision site. Patient instructed in detail about activities to heed with caution, allowing pain to be the guide.  These activities include but are not limited to: mowing the lawn, riding a bike, walking a dog, lifting a child, workshop hobbies, golfing, sexual activity, vacuuming, fishing, scrubbing the floors, and moving furniture. Patient was given the [address] in the [place] handout to describe each of these activities in detail. Patient instructed no asymmetrical reaching over head to ensure B UEs when shoulders >90* i.e. reaching in cabinets and dressing. Instruction on upper body dressing techniques of over head, then arms through to decrease pain and unilateral shoulder flexion >90*. Instruction on the benefits of utilizing B UEs during functional tasks i.e. opening the fridge, stepping into the tub. Instruction if continued pain at home with shoulder IR for BM hygiene can use wet wipes and toilet tongs PRN. Avoid valsalva maneuvers. May have to adjust home setup to increase ease with items closer to waist height to prevent deep bending and the automatic  of asymmetrical UE WB/pushing for stabilization during bending. Benefit to don clothing tailor sitting and don all clothing while sitting prior to standing. Patient demonstrated lower body dressing with Moderate Assistance (will need edu on AE). Instruction and indicated understanding on the benefits of loose clothing throughout to accommodate for post surgical swelling, decreased ROM and increased pain. Instruction and indicated understanding the technique of pull over shirt versus front open clothing. Increase activity tolerance for home, work, and sexual intercourse by pacing self with increasing the arm exercises, sitting duration, frequency OOB, walking, standing, and ADLs. Instructed and indicated understanding of s/s of too much activity, how to respond to s/s safely. Therapeutic Exercises:   Patient instructed on the benefits and demonstrated cardiac exercises while seated/standing with Contact guard assistance.  Instructed and indicated understanding on how to progress reps, sets against gravity, pacing through progressive muscle strengthening standing based on surgeon clearance for more weight in prep for basic and instrumental ADLs. CARDIAC   EXERCISE    Sets    Reps    Active  Active Assist    Passive  Self ROM    Comments    Shoulder flexion  1  5   [x]                            []                             []                             []                                Shoulder abduction  1  5  [x]                             []                             []                             []                                Scapular elevation  1  5  [x]                             []                              []                             []                                Scapular retraction  1  5  [x]                             []                             []                             []                                Trunk rotation  1  5  [x]                             []                             []                             []                                Trunk sidebending  1  5  [x]                             []                              []                             []                                   standing     Functional Measure:  Barthel Index:    Bathin  Bladder: 0  Bowels: 10  Groomin  Dressin  Feedin  Mobility: 0  Stairs: 0  Toilet Use: 5  Transfer (Bed to Chair and Back): 10  Total: 40/100        The Barthel ADL Index: Guidelines  1. The index should be used as a record of what a patient does, not as a record of what a patient could do. 2. The main aim is to establish degree of independence from any help, physical or verbal, however minor and for whatever reason. 3. The need for supervision renders the patient not independent. 4. A patient's performance should be established using the best available evidence.  Asking the patient, friends/relatives and nurses are the usual sources, but direct observation and common sense are also important. However direct testing is not needed. 5. Usually the patient's performance over the preceding 24-48 hours is important, but occasionally longer periods will be relevant. 6. Middle categories imply that the patient supplies over 50 per cent of the effort. 7. Use of aids to be independent is allowed. [place] Peer., Barthel, D.W. (0822). Functional evaluation: the Barthel Index. [facility] (14)2. YUSUF Raymundo, Guy Alegre., Merlin Ates., [place], [address] Measuring the change indisability after inpatient rehabilitation; comparison of the responsiveness of the Barthel Index and Functional [place] Measure. Journal of Neurology, Neurosurgery, and Psychiatry, 66(4), 018-213. ETELVINA Mcneil, NILAM Cabrera, & Benja Cherry M.A. (2004.) Assessment of post-stroke quality of life in cost-effectiveness studies: The usefulness of the Barthel Index and the EuroQoL-5D. Quality of Life Research, 15, 017-94        Occupational Therapy Evaluation Charge Determination   History Examination Decision-Making   LOW Complexity : Brief history review  MEDIUM Complexity : 3-5 performance deficits relating to physical, cognitive , or psychosocial skils that result in activity limitations and / or participation restrictions MEDIUM Complexity : Patient may present with comorbidities that affect occupational performnce.  Miniml to moderate modification of tasks or assistance (eg, physical or verbal ) with assesment(s) is necessary to enable patient to complete evaluation       Based on the above components, the patient evaluation is determined to be of the following complexity level: LOW   Pain Ratin/10 chest pain during shoulder flexion exercise    Activity Tolerance:   Fair, desaturates with exertion and requires oxygen, and requires rest breaks    After treatment patient left in no apparent distress:    Supine in bed, Heels elevated for pressure relief, Call bell within reach, and Caregiver / family present    COMMUNICATION/EDUCATION:   The patients plan of care was discussed with: Physical therapist and Registered nurse. Home safety education was provided and the patient/caregiver indicated understanding., Patient/family have participated as able in goal setting and plan of care. , and Patient/family agree to work toward stated goals and plan of care. This patients plan of care is appropriate for delegation to [facility].     Thank you for this referral.  Trace Rojas  Time Calculation: 51 mins

## 2021-04-27 NOTE — PROGRESS NOTES
Reason for Admission: Patient is post-op day 1 s/p CABG                      RUR Score:  20% risk of re-admission                 PCP: First and Last name:   DMITRY De Jesus     Name of Practice: [facility]   Are you a current patient: Yes/No: Yes   Approximate date of last visit: Within last year   Can you participate in a virtual visit if needed: N/A    Do you (patient/family) have any concerns for transition/discharge? None identified at this time                 Plan for utilizing home health: The patient will need home health upon discharge- CM provided home health list for patient and spouse to review     Current Advanced Directive/Advance Care Plan:  Full Code      Healthcare Decision Maker:   Click here to complete [address] including selection of the Healthcare Decision Maker Relationship (ie \"Primary\") Spouse is legal NOK              Transition of Care Plan:  Home with home health     The CM met with the patient at bedside along with his wife John Guardado in order to introduce the role of CM and assess for patient needs. The patient lives in a two-story home with his wife, however, everything is first floor accessible- patient does not need to go to second floor. The patient verified demographics and insurance information. The patient is independent at baseline with ADLs and mobility, denied use of DME in home- has access to cane, but does not use regularly. The patient utilizes [facility] pharmacy for prescriptions, or [facility] for mail order through his [employer] benefits. The CM provided education on home health, provided home health list for patient and spouse to review. CM will follow for transitions of care needs. Medicare pt has received 1st IM letter informing them of their right to appeal the discharge. Copy has been placed on pt bedside chart.     MAX Jarrett    Care Management Interventions  PCP Verified by CM: Yes(PCP is DMITRY Blancas Ute Rosario  Mode of Transport at Discharge:  Other (see comment)(Family to transport )  Transition of Care Consult (CM Consult): Discharge Planning  MyChart Signup: Yes  Physical Therapy Consult: Yes  Occupational Therapy Consult: Yes  Current Support Network: Own Home, Lives with Spouse  Confirm Follow Up Transport: Family  Discharge Location  Discharge Placement: Home with home health

## 2021-04-27 NOTE — PROGRESS NOTES
Physical Therapy Note:  Attempted to see patient x2. Initially patient had just transferred to chair and went into Afib. Returned ~1 hour later and patient now in supine for MAC line removal. Will defer and follow up tomorrow for progressive mobilization.   Melquiades Krishna, PT, DPT

## 2021-04-27 NOTE — DIABETES MGMT
[address] [facility]    CLINICAL NURSE SPECIALIST CONSULT     INITIAL NOTE    Initial Presentation   Lance Major is a 69 y.o. male admitted s/p CABG x5. Recent CVA s/p cardiac cath 3/2021. HX:   Past Medical History:   Diagnosis Date    CAD (coronary artery disease)     Chronic lower back pain     CVA (cerebral vascular accident) ([address]     HLD (hyperlipidemia)     HTN (hypertension)     Kidney stones     Thalassemia trait         Hospital course   Clinical progress has been uncomplicated post op. Diabetes    Patient has NO diabetes-A1C 5.3%    Consulted by Provider for advanced diabetes nursing assessment and care, specifically related to   [x] Transitioning off Natalie Manner   [x] Inpatient management strategy  [] Home management assessment  [] Survival skill education    Diabetes-related medical history:N/A  Acute complications  NONE  Neurological complications  NONE  Microvascular disease  NONE  Macrovascular disease  CAD and Cerebral vascular accident  Other associated conditions     HTN/HLD    Diabetes medication history:NONE    Subjective   Sitting up at bedside-      Objective   Physical exam  General Alert, oriented and in no acute distress. Conversant and cooperative. Vital Signs   Visit Vitals  BP (!) 146/79 (BP 1 Location: Left arm, BP Patient Position: Sitting)   Pulse 66   Temp 99.7 °F (37.6 °C)   Resp 19   Ht 5' 3.75\" (1.619 m)   Wt 74.8 kg (164 lb 14.4 oz)   SpO2 93%   BMI 28.53 kg/m²     Skin  Warm and dry. Heart   Regular rate and rhythm.  No murmurs, rubs or gallops  Lungs  Clear to auscultation without rales or rhonchi  Extremities No foot wounds    Diabetic foot exam: N/A        Laboratory  BMP:   Lab Results   Component Value Date/Time     04/27/2021 03:11 AM    K 4.3 04/27/2021 03:11 AM     (H) 04/27/2021 03:11 AM    CO2 23 04/27/2021 03:11 AM    AGAP 6 04/27/2021 03:11 AM    GLU 97 04/27/2021 03:11 AM    BUN 20 04/27/2021 03:11 AM    CREA 0.96 04/27/2021 03:11 AM    GFRAA >60 04/27/2021 03:11 AM    GFRNA >60 04/27/2021 03:11 AM      Factors impacting BG management  Factor Dose Comments   Nutrition:  Cardiac regular       Drugs:  Other: antiarrhythmic   amiodarone        Pain Chronic back and post op    Other: glycemic control for post op stress response Insulin infusion 4/26/21: 16.2units     Blood glucose pattern  Insulin infusion      Assessment and Plan   Nursing Diagnosis Risk for unstable blood glucose pattern   Nursing Intervention Domain 5254 Decision-making Support   Nursing Interventions Examined current inpatient diabetes control   Explored factors facilitating and impeding inpatient management  Identified self-management practices impeding diabetes control  Explored corrective strategies with patient and responsible inpatient provider   Informed patient of rational for insulin strategy while hospitalized       Evaluation   This  male, with NO diabetes is s/p CABG x5. Recent A1C 5.3%. In March 2021, after cardiac cath he suffered a CVA so CABG was delayed. Recommendations     1. Insulin infusion per post-operative period    2. Lantus 2 hrs prior to d/c infusion when transitioning off    3. Blood glucose monitoring ACHS once off insulin infusion. If glucose within goal, ok to trend on am labs and d/c correctional insulin. Billing Code(s)   [id number]    Before making these care recommendations, I personally reviewed the hospitalization record, including notes, laboratory & diagnostic data and current medications, and   examined the patient at the bedside (circumstances permitting) before making care recommendations.      Total minutes: 509 PHAM Carnes, CNS  Diabetes Clinical Nurse Specialist  [facility] for Diabetes [facility]  Access via [facility]

## 2021-04-28 ENCOUNTER — APPOINTMENT (OUTPATIENT)
Dept: GENERAL RADIOLOGY | Age: 71
DRG: 236 | End: 2021-04-28
Attending: NURSE PRACTITIONER
Payer: MEDICARE

## 2021-04-28 LAB
ADMINISTERED INITIALS, ADMINIT: NORMAL
ALBUMIN SERPL-MCNC: 3.7 G/DL (ref 3.5–5)
ALBUMIN/GLOB SERPL: 1.7 {RATIO} (ref 1.1–2.2)
ALP SERPL-CCNC: 37 U/L (ref 45–117)
ALT SERPL-CCNC: 29 U/L (ref 12–78)
ANION GAP SERPL CALC-SCNC: 8 MMOL/L (ref 5–15)
AST SERPL-CCNC: 110 U/L (ref 15–37)
ATRIAL RATE: 65 BPM
BILIRUB SERPL-MCNC: 1.1 MG/DL (ref 0.2–1)
BUN SERPL-MCNC: 22 MG/DL (ref 6–20)
BUN/CREAT SERPL: 19 (ref 12–20)
CALCIUM SERPL-MCNC: 8.6 MG/DL (ref 8.5–10.1)
CALCULATED R AXIS, ECG10: 6 DEGREES
CALCULATED T AXIS, ECG11: 18 DEGREES
CARB RATIO, CHOR: 15
CARBOHYDRATE EATEN, CHO: 51 G
CHLORIDE SERPL-SCNC: 101 MMOL/L (ref 97–108)
CO2 SERPL-SCNC: 24 MMOL/L (ref 21–32)
CREAT SERPL-MCNC: 1.13 MG/DL (ref 0.7–1.3)
D50 ADMINISTERED, D50ADM: 0 ML
D50 ORDER, D50ORD: 0 ML
DIAGNOSIS, 93000: NORMAL
ERYTHROCYTE [DISTWIDTH] IN BLOOD BY AUTOMATED COUNT: 15 % (ref 11.5–14.5)
ERYTHROCYTE [DISTWIDTH] IN BLOOD BY AUTOMATED COUNT: 15.8 % (ref 11.5–14.5)
GLOBULIN SER CALC-MCNC: 2.2 G/DL (ref 2–4)
GLSCOM COMMENTS: NORMAL
GLUCOSE BLD STRIP.AUTO-MCNC: 104 MG/DL (ref 65–100)
GLUCOSE BLD STRIP.AUTO-MCNC: 105 MG/DL (ref 65–100)
GLUCOSE BLD STRIP.AUTO-MCNC: 112 MG/DL (ref 65–100)
GLUCOSE BLD STRIP.AUTO-MCNC: 115 MG/DL (ref 65–100)
GLUCOSE BLD STRIP.AUTO-MCNC: 119 MG/DL (ref 65–100)
GLUCOSE BLD STRIP.AUTO-MCNC: 119 MG/DL (ref 65–100)
GLUCOSE BLD STRIP.AUTO-MCNC: 126 MG/DL (ref 65–100)
GLUCOSE BLD STRIP.AUTO-MCNC: 154 MG/DL (ref 65–100)
GLUCOSE BLD STRIP.AUTO-MCNC: 166 MG/DL (ref 65–100)
GLUCOSE BLD STRIP.AUTO-MCNC: 193 MG/DL (ref 65–100)
GLUCOSE BLD STRIP.AUTO-MCNC: 219 MG/DL (ref 65–100)
GLUCOSE BLD STRIP.AUTO-MCNC: 425 MG/DL (ref 65–100)
GLUCOSE BLD STRIP.AUTO-MCNC: 99 MG/DL (ref 65–100)
GLUCOSE BLD STRIP.AUTO-MCNC: 99 MG/DL (ref 65–100)
GLUCOSE BLD STRIP.AUTO-MCNC: >600 MG/DL (ref 65–100)
GLUCOSE SERPL-MCNC: 90 MG/DL (ref 65–100)
GLUCOSE, GLC: 104 MG/DL
GLUCOSE, GLC: 105 MG/DL
GLUCOSE, GLC: 112 MG/DL
GLUCOSE, GLC: 119 MG/DL
GLUCOSE, GLC: 119 MG/DL
GLUCOSE, GLC: 126 MG/DL
GLUCOSE, GLC: 154 MG/DL
GLUCOSE, GLC: 166 MG/DL
GLUCOSE, GLC: 99 MG/DL
GLUCOSE, GLC: 99 MG/DL
HCT VFR BLD AUTO: 22 % (ref 36.6–50.3)
HCT VFR BLD AUTO: 25.5 % (ref 36.6–50.3)
HGB BLD-MCNC: 6.9 G/DL (ref 12.1–17)
HGB BLD-MCNC: 7.9 G/DL (ref 12.1–17)
HIGH TARGET, HITG: 130 MG/DL
INSULIN ADMINSTERED, INSADM: 2.5 UNITS/HOUR
INSULIN ADMINSTERED, INSADM: 2.8 UNITS/HOUR
INSULIN ADMINSTERED, INSADM: 2.9 UNITS/HOUR
INSULIN ADMINSTERED, INSADM: 2.9 UNITS/HOUR
INSULIN ADMINSTERED, INSADM: 3.8 UNITS/HOUR
INSULIN ADMINSTERED, INSADM: 3.8 UNITS/HOUR
INSULIN ADMINSTERED, INSADM: 4.2 UNITS/HOUR
INSULIN ADMINSTERED, INSADM: 4.4 UNITS/HOUR
INSULIN ADMINSTERED, INSADM: 6 UNITS/HOUR
INSULIN ADMINSTERED, INSADM: 7.8 UNITS/HOUR
INSULIN BOLUS ADMINISTERED, INSBOLADM: 3.4 UNITS/HOUR
INSULIN BOLUS ORDERED, INSBOLORD: 3.4 UNITS/HOUR
INSULIN ORDER, INSORD: 2.5 UNITS/HOUR
INSULIN ORDER, INSORD: 2.8 UNITS/HOUR
INSULIN ORDER, INSORD: 2.9 UNITS/HOUR
INSULIN ORDER, INSORD: 2.9 UNITS/HOUR
INSULIN ORDER, INSORD: 3.8 UNITS/HOUR
INSULIN ORDER, INSORD: 3.8 UNITS/HOUR
INSULIN ORDER, INSORD: 4.2 UNITS/HOUR
INSULIN ORDER, INSORD: 4.4 UNITS/HOUR
INSULIN ORDER, INSORD: 6 UNITS/HOUR
INSULIN ORDER, INSORD: 7.8 UNITS/HOUR
LOW TARGET, LOT: 95 MG/DL
MAGNESIUM SERPL-MCNC: 2.3 MG/DL (ref 1.6–2.4)
MCH RBC QN AUTO: 20.8 PG (ref 26–34)
MCH RBC QN AUTO: 21.1 PG (ref 26–34)
MCHC RBC AUTO-ENTMCNC: 31 G/DL (ref 30–36.5)
MCHC RBC AUTO-ENTMCNC: 31.4 G/DL (ref 30–36.5)
MCV RBC AUTO: 67.3 FL (ref 80–99)
MCV RBC AUTO: 67.3 FL (ref 80–99)
MINUTES UNTIL NEXT BG, NBG: 120 MIN
MINUTES UNTIL NEXT BG, NBG: 60 MIN
MULTIPLIER, MUL: 0.06
MULTIPLIER, MUL: 0.07
NRBC # BLD: 0 K/UL (ref 0–0.01)
NRBC # BLD: 0 K/UL (ref 0–0.01)
NRBC BLD-RTO: 0 PER 100 WBC
NRBC BLD-RTO: 0 PER 100 WBC
ORDER INITIALS, ORDINIT: NORMAL
P-R INTERVAL, ECG05: 142 MS
PLATELET # BLD AUTO: 113 K/UL (ref 150–400)
PLATELET # BLD AUTO: 98 K/UL (ref 150–400)
PMV BLD AUTO: 10.9 FL (ref 8.9–12.9)
PMV BLD AUTO: 11.7 FL (ref 8.9–12.9)
POTASSIUM SERPL-SCNC: 3.6 MMOL/L (ref 3.5–5.1)
PROT SERPL-MCNC: 5.9 G/DL (ref 6.4–8.2)
Q-T INTERVAL, ECG07: 422 MS
QRS DURATION, ECG06: 72 MS
QTC CALCULATION (BEZET), ECG08: 442 MS
RBC # BLD AUTO: 3.27 M/UL (ref 4.1–5.7)
RBC # BLD AUTO: 3.79 M/UL (ref 4.1–5.7)
SERVICE CMNT-IMP: ABNORMAL
SERVICE CMNT-IMP: NORMAL
SERVICE CMNT-IMP: NORMAL
SODIUM SERPL-SCNC: 133 MMOL/L (ref 136–145)
VENTRICULAR RATE, ECG03: 66 BPM
WBC # BLD AUTO: 7.7 K/UL (ref 4.1–11.1)
WBC # BLD AUTO: 8.5 K/UL (ref 4.1–11.1)

## 2021-04-28 PROCEDURE — 74011250636 HC RX REV CODE- 250/636: Performed by: NURSE PRACTITIONER

## 2021-04-28 PROCEDURE — 82962 GLUCOSE BLOOD TEST: CPT

## 2021-04-28 PROCEDURE — 65660000001 HC RM ICU INTERMED STEPDOWN

## 2021-04-28 PROCEDURE — 77010033678 HC OXYGEN DAILY

## 2021-04-28 PROCEDURE — 74011250637 HC RX REV CODE- 250/637: Performed by: NURSE PRACTITIONER

## 2021-04-28 PROCEDURE — 74011636637 HC RX REV CODE- 636/637: Performed by: NURSE PRACTITIONER

## 2021-04-28 PROCEDURE — 97535 SELF CARE MNGMENT TRAINING: CPT

## 2021-04-28 PROCEDURE — 36415 COLL VENOUS BLD VENIPUNCTURE: CPT

## 2021-04-28 PROCEDURE — 71045 X-RAY EXAM CHEST 1 VIEW: CPT

## 2021-04-28 PROCEDURE — 97110 THERAPEUTIC EXERCISES: CPT

## 2021-04-28 PROCEDURE — 99231 SBSQ HOSP IP/OBS SF/LOW 25: CPT | Performed by: CLINICAL NURSE SPECIALIST

## 2021-04-28 PROCEDURE — 85027 COMPLETE CBC AUTOMATED: CPT

## 2021-04-28 PROCEDURE — 80053 COMPREHEN METABOLIC PANEL: CPT

## 2021-04-28 PROCEDURE — 97161 PT EVAL LOW COMPLEX 20 MIN: CPT

## 2021-04-28 PROCEDURE — 83735 ASSAY OF MAGNESIUM: CPT

## 2021-04-28 PROCEDURE — 51798 US URINE CAPACITY MEASURE: CPT

## 2021-04-28 PROCEDURE — 74011000258 HC RX REV CODE- 258: Performed by: NURSE PRACTITIONER

## 2021-04-28 PROCEDURE — 97116 GAIT TRAINING THERAPY: CPT

## 2021-04-28 RX ORDER — FUROSEMIDE 10 MG/ML
40 INJECTION INTRAMUSCULAR; INTRAVENOUS ONCE
Status: COMPLETED | OUTPATIENT
Start: 2021-04-28 | End: 2021-04-28

## 2021-04-28 RX ORDER — OXYMETAZOLINE HCL 0.05 %
2 SPRAY, NON-AEROSOL (ML) NASAL
Status: DISCONTINUED | OUTPATIENT
Start: 2021-04-28 | End: 2021-04-30 | Stop reason: HOSPADM

## 2021-04-28 RX ORDER — POTASSIUM CHLORIDE 750 MG/1
20 TABLET, FILM COATED, EXTENDED RELEASE ORAL
Status: COMPLETED | OUTPATIENT
Start: 2021-04-28 | End: 2021-04-28

## 2021-04-28 RX ORDER — INSULIN GLARGINE 100 [IU]/ML
29 INJECTION, SOLUTION SUBCUTANEOUS ONCE
Status: COMPLETED | OUTPATIENT
Start: 2021-04-28 | End: 2021-04-28

## 2021-04-28 RX ADMIN — AMIODARONE HYDROCHLORIDE 400 MG: 200 TABLET ORAL at 20:49

## 2021-04-28 RX ADMIN — FUROSEMIDE 40 MG: 10 INJECTION, SOLUTION INTRAMUSCULAR; INTRAVENOUS at 09:09

## 2021-04-28 RX ADMIN — ACETAMINOPHEN 650 MG: 325 TABLET ORAL at 06:41

## 2021-04-28 RX ADMIN — Medication 400 MG: at 17:56

## 2021-04-28 RX ADMIN — Medication 10 ML: at 14:08

## 2021-04-28 RX ADMIN — INSULIN GLARGINE 29 UNITS: 100 INJECTION, SOLUTION SUBCUTANEOUS at 13:18

## 2021-04-28 RX ADMIN — MUPIROCIN: 20 OINTMENT TOPICAL at 17:56

## 2021-04-28 RX ADMIN — AMIODARONE HYDROCHLORIDE 1 MG/MIN: 50 INJECTION, SOLUTION INTRAVENOUS at 04:39

## 2021-04-28 RX ADMIN — OXYCODONE 10 MG: 5 TABLET ORAL at 12:00

## 2021-04-28 RX ADMIN — FAMOTIDINE 20 MG: 20 TABLET ORAL at 08:52

## 2021-04-28 RX ADMIN — OXYCODONE 5 MG: 5 TABLET ORAL at 08:07

## 2021-04-28 RX ADMIN — MUPIROCIN: 20 OINTMENT TOPICAL at 09:03

## 2021-04-28 RX ADMIN — CHLORHEXIDINE GLUCONATE 10 ML: 1.2 RINSE ORAL at 20:49

## 2021-04-28 RX ADMIN — DOCUSATE SODIUM 50 MG AND SENNOSIDES 8.6 MG 1 TABLET: 8.6; 5 TABLET, FILM COATED ORAL at 08:52

## 2021-04-28 RX ADMIN — INSULIN LISPRO 3 UNITS: 100 INJECTION, SOLUTION INTRAVENOUS; SUBCUTANEOUS at 12:50

## 2021-04-28 RX ADMIN — AMIODARONE HYDROCHLORIDE 400 MG: 200 TABLET ORAL at 08:52

## 2021-04-28 RX ADMIN — ATORVASTATIN CALCIUM 40 MG: 40 TABLET, FILM COATED ORAL at 21:00

## 2021-04-28 RX ADMIN — Medication 400 MG: at 08:52

## 2021-04-28 RX ADMIN — DOCUSATE SODIUM 50 MG AND SENNOSIDES 8.6 MG 1 TABLET: 8.6; 5 TABLET, FILM COATED ORAL at 17:56

## 2021-04-28 RX ADMIN — Medication 3 MG: at 20:49

## 2021-04-28 RX ADMIN — AMIODARONE HYDROCHLORIDE 0.5 MG/MIN: 50 INJECTION, SOLUTION INTRAVENOUS at 21:00

## 2021-04-28 RX ADMIN — ASPIRIN 81 MG: 81 TABLET, CHEWABLE ORAL at 08:52

## 2021-04-28 RX ADMIN — POTASSIUM CHLORIDE 20 MEQ: 750 TABLET, FILM COATED, EXTENDED RELEASE ORAL at 09:09

## 2021-04-28 RX ADMIN — CHLORHEXIDINE GLUCONATE 10 ML: 1.2 RINSE ORAL at 09:03

## 2021-04-28 RX ADMIN — FAMOTIDINE 20 MG: 20 TABLET ORAL at 20:50

## 2021-04-28 RX ADMIN — FERROUS SULFATE TAB 325 MG (65 MG ELEMENTAL FE) 325 MG: 325 (65 FE) TAB at 08:52

## 2021-04-28 RX ADMIN — POLYETHYLENE GLYCOL 3350 17 G: 17 POWDER, FOR SOLUTION ORAL at 08:52

## 2021-04-28 RX ADMIN — Medication 10 ML: at 21:00

## 2021-04-28 RX ADMIN — Medication 10 ML: at 08:08

## 2021-04-28 RX ADMIN — AMIODARONE HYDROCHLORIDE 1 MG/MIN: 50 INJECTION, SOLUTION INTRAVENOUS at 11:48

## 2021-04-28 RX ADMIN — TAMSULOSIN HYDROCHLORIDE 0.4 MG: 0.4 CAPSULE ORAL at 08:52

## 2021-04-28 RX ADMIN — CLOPIDOGREL BISULFATE 75 MG: 75 TABLET ORAL at 08:52

## 2021-04-28 RX ADMIN — ACETAMINOPHEN 650 MG: 325 TABLET ORAL at 17:57

## 2021-04-28 NOTE — DIABETES MGMT
[address] [place] [address]    CLINICAL NURSE SPECIALIST CONSULT     FOLLOW UP  NOTE    Initial Presentation   Arianna Maldonado is a 69 y.o. male admitted s/p CABG x5. Recent CVA s/p cardiac cath 3/2021. HX:   Past Medical History:   Diagnosis Date    CAD (coronary artery disease)     Chronic lower back pain     CVA (cerebral vascular accident) ([address]     HLD (hyperlipidemia)     HTN (hypertension)     Kidney stones     Thalassemia trait         Hospital course   Clinical progress has been uncomplicated post op.     4/28/21: transitioning off insulin infusion-basal given  Diabetes    Patient has NO diabetes-A1C 5.3%    Consulted by Provider for advanced diabetes nursing assessment and care, specifically related to   [x] Transitioning off Meredith Sandra   [x] Inpatient management strategy  [] Home management assessment  [] Survival skill education    Diabetes-related medical history:N/A  Acute complications  NONE  Neurological complications  NONE  Microvascular disease  NONE  Macrovascular disease  CAD and Cerebral vascular accident  Other associated conditions     HTN/HLD    Diabetes medication history:NONE    Subjective   Sitting up at bedside- working with PT/OT     Objective   Physical exam  General Alert, oriented and in no acute distress. Conversant and cooperative. Vital Signs   Visit Vitals  /81   Pulse 76   Temp 98.6 °F (37 °C)   Resp 16   Ht 5' 3.75\" (1.619 m)   Wt 77.4 kg (170 lb 11.2 oz)   SpO2 94%   BMI 29.53 kg/m²     Skin  Warm and dry. Heart   Regular rate and rhythm.  No murmurs, rubs or gallops  Lungs  Clear to auscultation without rales or rhonchi  Extremities No foot wounds    Diabetic foot exam: N/A        Laboratory  BMP:   Lab Results   Component Value Date/Time     (L) 04/28/2021 05:06 AM    K 3.6 04/28/2021 05:06 AM     04/28/2021 05:06 AM    CO2 24 04/28/2021 05:06 AM    AGAP 8 04/28/2021 05:06 AM    GLU 90 04/28/2021 05:06 AM    BUN 22 (H) 04/28/2021 05:06 AM    CREA 1.13 04/28/2021 05:06 AM    GFRAA >60 04/28/2021 05:06 AM    GFRNA >60 04/28/2021 05:06 AM      Factors impacting BG management  Factor Dose Comments   Nutrition:  Cardiac regular       Drugs:  Other: antiarrhythmic   amiodarone        Pain Chronic back and post op    Other: glycemic control for post op stress response Insulin infusion-transitioning off 4/28/21 4/26/21: 16.2units     Blood glucose pattern  Insulin infusion      Assessment and Plan   Nursing Diagnosis Risk for unstable blood glucose pattern   Nursing Intervention Domain 5252 Decision-making Support   Nursing Interventions Examined current inpatient diabetes control   Explored factors facilitating and impeding inpatient management  Identified self-management practices impeding diabetes control  Explored corrective strategies with patient and responsible inpatient provider   Informed patient of rational for insulin strategy while hospitalized       Evaluation   This  male, with NO diabetes is s/p CABG x5. Recent A1C 5.3%. In March 2021, after cardiac cath he suffered a CVA so CABG was delayed. Since patient is NOT diabetic anticipate patient will not require additional insulin other than correctional after insulin infusion discontinued. Recommendations     1. Insulin infusion per post-operative period    2. Lantus 2 hrs prior to d/c infusion when transitioning off    3. Blood glucose monitoring ACHS once off insulin infusion. If glucose within goal, ok to trend on am labs and d/c correctional insulin. Billing Code(s)   04916    Before making these care recommendations, I personally reviewed the hospitalization record, including notes, laboratory & diagnostic data and current medications, and   examined the patient at the bedside (circumstances permitting) before making care recommendations.      Total minutes: [address] [facility] Joycie Phalen, CNS  Diabetes Clinical Nurse Specialist  [facility]  Access via [email or web address]

## 2021-04-28 NOTE — PROGRESS NOTES
[facility] ICU Progress Note    Admit Date: 2021  POD:  2 Day Post-Op    Procedure:  Procedure(s):  ON PUMP CORONARY ARTERY BYPASS GRAFT X 5  WITH LIMA, LEFT AND RIGHT SAPHENOUS VEIN HARVEST VIA EVH, NICK AND EPIAORTIC ULTRASOUND BY DR CULLEN [facility]        Subjective:   Pt seen with Dr. James Almeida. Pt up in chair, pain improving. Afebrile, NC 2L. Had a-fib yesterday afternoon, converted with amio bolus, now having short bursts, converts on own. Cont amio gtt for now, on insulin      Objective:   Vitals:  Blood pressure 117/80, pulse 84, temperature 99.8 °F (37.7 °C), resp. rate 20, height 5' 3.75\" (1.619 m), weight 170 lb 11.2 oz (77.4 kg), SpO2 95 %. Temp (24hrs), Av.2 °F (37.3 °C), Min:98.6 °F (37 °C), Max:99.8 °F (37.7 °C)    EKG/Rhythm: SR     CT Output: +290 ml     Oxygen Therapy:  Oxygen Therapy  O2 Sat (%): 95 % (21 0900)  Pulse via Oximetry: 53 beats per minute (21 0615)  O2 Device: Nasal cannula (21 0800)  Skin Assessment: Clean, dry, & intact (21 0800)  O2 Flow Rate (L/min): 2 l/min (21 0800)  FIO2 (%): 80 % (21 1400)    CXR:   CXR Results  (Last 48 hours)               21 0528  XR CHEST PORT Final result    Impression:  Interval removal of pulmonary artery catheter. Otherwise no change. Narrative:  Clinical history: Chest tube   INDICATION:   Chest tube   COMPARISON: 2021       FINDINGS:   AP portable upright view of the chest demonstrates a stable  cardiopericardial   silhouette. Mediastinal pleural drains remain. Pulmonary artery catheter has   been removed. Stable minimal basilar atelectasis and effusion. .There is no focal   consolidation. .There is no pneumothorax. . Patient is on a cardiac monitor. 21 0456  XR CHEST PORT Final result    Impression:  Interval diminished lung volumes with right basilar atelectasis and effusion. ET tube and NG tube have been removed.                 Narrative:  Clinical history: postop heart INDICATION:   postop heart   COMPARISON: 4/26/2021       FINDINGS:   AP portable upright view of the chest demonstrates a stable  cardiopericardial   silhouette. ET tube and NG tube have been removed. Mediastinal pleural drains   remain. Pulmonary artery catheter is in place. Interval right basilar   atelectasis and effusion. Dominga Salm Dominga Salm There is no pneumothorax. . Patient is on a cardiac   monitor. 04/26/21 1417  XR CHEST PORT Final result    Impression:  Life-support lines and tubes as described above. Bilateral lower   lobe atelectasis following CABG procedure. Narrative: Indication: Status post CABG. Comparison to 4/20/2021. Portable exam obtained at 1312 demonstrates placement   of a right IJ Austin-Nilda catheter with the tip projecting over the proximal right   pulmonary artery. ET tube has been placed with the tip projecting above the   thoracic inlet. NG tube has been placed with the tip projecting over the fundus. Mediastinal drain and left-sided chest tube project in satisfactory position. The patient is status post CABG procedure. Bilateral lower lobe atelectasis is   noted. There is no pneumothorax. Admission Weight: Last Weight   Weight: 160 lb 15 oz (73 kg) Weight: 170 lb 11.2 oz (77.4 kg)     Intake / Output / Drain:  Current Shift: 04/28 0701 - 04/28 1900  In: -   Out: 160 [Urine:80; Drains:80]  Last 24 hrs.:     Intake/Output Summary (Last 24 hours) at 4/28/2021 0936  Last data filed at 4/28/2021 0900  Gross per 24 hour   Intake 2162.71 ml   Output 1640 ml   Net 522.71 ml       EXAM:  General:  Pleasant, no distress                                                                                            Lungs:   Crackles in bases bilat   Incision:  Dsg C/D/I   Heart:  Regular rate and rhythm, S1, S2 normal, no murmur, click, or gallop. +rub but improved today   Abdomen:   Soft, non-tender. Bowel sounds hypoactive. No masses,  No organomegaly. Extremities:  No edema. Palpable pulses bilat    Neurologic:  Gross motor and sensory apparatus intact. Labs:   Recent Labs     21  0732 21  0506 21  0506 21  1442 21  1442   WBC  --   --  8.5   < > 7.6   HGB  --   --  7.9*   < > 9.4*   HCT  --   --  25.5*   < > 30.4*   PLT  --   --  113*   < > 125*   NA  --   --  133*   < > 144   K  --   --  3.6   < > 3.8   BUN  --   --  22*   < > 16   CREA  --   --  1.13   < > 1.03   GLU  --   --  90   < > 127*   GLUCPOC 119*   < >  --    < > 135*   INR  --   --   --   --  1.2*    < > = values in this interval not displayed. Assessment:     Active Problems:    CAD (coronary artery disease) (3/5/2021)      S/P CABG x 5 (2021)      Overview: CABG X 5 LIMA to LAD, RSVG to PDA, Sequential RSVG to Diag, OM1, OM2      LEFT AND RIGHT ENDOSCOPIC SAPHENOUS VEIN HARVEST       Plan/Recommendations/Medical Decision Makin. S/p CABG: on ASA, statin. Hold BB until BP can tolerate  2. Atelectasis, small R effusion: increase IS use and activity, wean O2 to sats > 92%. Crackles increased today, give lasix 40 mg IV, fluid balance+  3. Hypertension: hold meds until BP improves, monitor after diuresis. HR chronically low per pt - in high 40/50's preop  4. Hyperlipidemia: resumed statin  5. CVA with visual field deficits in March: Multiple small infarcts seen on MRI. On asa, statin, plavix preop, resumed. Carotids ok. 6. BPH: improved since last visit. Had urolift in past. Had urology FU on , back on Uroxatral, sub flomax until family can bring med in. D/c Gregory today  7. Thalassemia trait: pt reports hx of and often times Hgb on lower side, repeating CBC today  8. Acute postop anemia: Hgb 7.9, trendt. Start iron today, also w/ Thalassemia trait per above    Dispo: PT/OT. Transfer orders in to [facility], waiting on bed.  D/c CT's today     Signed By: Yonatan Velasquez NP

## 2021-04-28 NOTE — PROGRESS NOTES
Transitions of Care: 17% risk of re-admission      -Anticipate discharge home with spouse and home health, referral placed to [facility]    -Family to transport   -Cardiac Surgery follow-up    The CM met with the patient and his wife at bedside, discussed home health choice- first choice is [facility], if they cannot accept, will be agreeable for [facility], [facility], or [facility]. CM will send referral to [facility]. Patient is doing well with PT/OT, monitor any home health PT needs. The CM called [facility]. with [facility] ([phone], f: [phone]). [facility]. confirmed they service the patient's area, the CM will fax referral directly for review.      10:49 a.m.- [facility] has accepted for home health services, information on AVS.     MAX Ni

## 2021-04-28 NOTE — PROGRESS NOTES
2000: Received report from Lisa Bryant, RN and Viv RN. Gtts dual verified. 0500: Redrawing labs (CBC, CMP) to ensure accuracy. Disregard 0400 CBC results. 0700: Pt OOB to chair x2 assist without difficulty. Pt in Afib rate 100-120, BP stable, pt asymptomatic.     0730: Pt self converted to NSR. Dr. Hector Wolfe and team at bedside for AM rounds, plan to remove chest tubes and mckeon today. 0800: Bedside and Verbal shift change report given to [address] [place] [address] and Ericka RN (oncoming nurse) by [facility] (offgoing nurse). Report included the following information SBAR, OR Summary, MAR, Med Rec Status and Cardiac Rhythm NSR.

## 2021-04-28 NOTE — PROGRESS NOTES
Problem: Self Care Deficits Care Plan (Adult)  Goal: *Acute Goals and Plan of Care (Insert Text)  Description: FUNCTIONAL STATUS PRIOR TO ADMISSION: Patient was independent and active without use of DME. Patient was active in his community playing pickle ball and walking several miles with his wife however has not been as active since 3/2021 with CVA and recent admission for CABG workup with CABG postponed due to neuro recommendations and HR recommendation of <100 bpm (per patient's wife). HOME SUPPORT: The patient lived with wife but did not require assist.    Occupational Therapy Goals  Initiated 4/27/2021  1. Patient will perform ADLs standing 5 mins without fatigue or LOB with supervision/set-up within 5 day(s). 2.  Patient will perform lower body ADLs with supervision/set-up within 5 day(s). 3.  Patient will perform gathering ADL items high and low 2/2 with supervision/set-up within 5 day(s). 4.  Patient will perform toilet transfers with supervision/set-up within 5 day(s). 5.  Patient will perform all aspects of toileting with supervision/set-up within 5 day(s). 6.  Patient will participate in cardiac/sternal upper extremity therapeutic exercise/activities to increase independence with ADLs with supervision/set-up for 5 minutes within 5 day(s). 4/28/2021 1012 by Deena Dahl  Outcome: Progressing Towards Goal   OCCUPATIONAL THERAPY TREATMENT  Patient: Aislinn Vivas (64 y.o. male)  Date: 4/28/2021  Diagnosis: CAD (coronary artery disease) [I25.10] <principal problem not specified>  Procedure(s) (LRB):  ON PUMP CORONARY ARTERY BYPASS GRAFT X 5  WITH LIMA, LEFT AND RIGHT SAPHENOUS VEIN HARVEST VIA EVH, NICK AND EPIAORTIC ULTRASOUND BY DR CULLEN [facility] (N/A) 2 Days Post-Op  Precautions: Fall, Sternal(\"move in the tube\")  Chart, occupational therapy assessment, plan of care, and goals were reviewed. ASSESSMENT  Patient continues with skilled OT services and is progressing towards goals.   Patient limited by generalized weakness, decreased endurance, decreased activity tolerance, slightly impaired standing balance, and with elevated HR with activity 100-145 bpm and O2 sats 85-90% on 2L NC O2 and patient receptive to verbal cues for utilizing energy conservation strategies such as PLB. Patient completed 3/6 exercises seated and 3/6 exercises standing today. Patient also completed standing grooming tasks at the sink x CGA. Recommend HHOT vs no needs pending medical and functional progression this hospital stay. Patient is verbalizing and is demonstrating understanding of mindful-based movements (\"move in the tube\") principles of keeping UEs proximal to ribcage to prevent lateral pull on the sternum during load-bearing activities with visual, verbal, and tactile cues required for compliance. Current Level of Function Impacting Discharge (ADLs): up to MOD A    Other factors to consider for discharge: O2 needs; tachycardia/Afib this session; mckeon cath; chest tubes         PLAN :  Patient continues to benefit from skilled intervention to address the above impairments. Continue treatment per established plan of care to address goals. Recommend with staff: OOB x 3 to chair; BUE cardiac exercises x 2    Recommend next OT session: LB ADLs; functional reach high/low    Recommendation for discharge: (in order for the patient to meet his/her long term goals)  Occupational therapy at least 2 days/week in the home vs no needs pending progression     This discharge recommendation:  Has not yet been discussed the attending provider and/or case management    IF patient discharges home will need the following DME: TBD       SUBJECTIVE:   Patient stated I am ready for these to come out (regarding chest tubes).     OBJECTIVE DATA SUMMARY:   Cognitive/Behavioral Status:  Neurologic State: Alert  Orientation Level: Oriented X4  Cognition: Appropriate for age attention/concentration  Perception: Appears intact  Perseveration: No perseveration noted  Safety/Judgement: Decreased insight into deficits    Functional Mobility and Transfers for ADLs:  Bed Mobility:  Sit to Supine: Moderate assistance    Transfers:  Sit to Stand: Contact guard assistance          Balance:  Sitting: Intact; Without support  Standing: Impaired; Without support  Standing - Static: Fair;Occasional  Standing - Dynamic : Fair;Occasional    ADL Intervention:       Grooming  Position Performed: Standing  Brushing Teeth: Contact guard assistance  Cues: Verbal cues provided                             Cognitive Retraining  Safety/Judgement: Decreased insight into deficits    Patient instructed and educated on mindful movement principles based on Move in The Tube concept to include maintaining bilateral elbows close to rib cage when performing any load-bearing activity such as getting in/out of bed, pushing up from a chair, opening a door, or lifting a box. Patient was given a handout with diagrams of each correct/incorrect method of performing each of the above tasks. Patient instructed on the ability to utilize upper extremities outside the tube when doing any non-load bearing activity such as washing hair/body, brushing teeth, retrieving clothing items, or scratching your back. Patient encouraged to also perform upper extremity exercises \"outside of the tube\" to prevent scar tissue formation around sternal incision site. Therapeutic Exercises:   Patient instructed on the benefits and demonstrated cardiac exercises while seated/standing with Stand-by assistance. Instructed and indicated understanding on how to progress reps, sets against gravity, pacing through progressive muscle strengthening standing based on surgeon clearance for more weight in prep for basic and instrumental ADLs. Instruction on the use of household items in place of weights as needed.     CARDIAC   EXERCISE    Sets    Reps    Active  Active Assist    Passive  Self ROM Comments    Shoulder flexion  1  5   [x]                            []                             []                             []                             standing   Shoulder abduction  1  5  [x]                             []                             []                             []                             standing   Scapular elevation  1  5  [x]                             []                              []                             []                                Scapular retraction  1  5  [x]                             []                             []                             []                                Trunk rotation  1  5  [x]                             []                             []                             []                                Trunk sidebending  1  5  [x]                             []                              []                             []                                   standing       Pain:  No complaints    Activity Tolerance:   Fair, requires rest breaks, observed SOB with activity, and HR 100s-140s with activity (Afib; tachy)    After treatment patient left in no apparent distress:   Supine in bed, Call bell within reach, and Caregiver / family present    COMMUNICATION/COLLABORATION:   The patients plan of care was discussed with: Physical therapist and Registered nurse.      Lizbeth Villa  Time Calculation: 25 mins

## 2021-04-28 NOTE — PROGRESS NOTES
Problem: Mobility Impaired (Adult and Pediatric)  Goal: *Acute Goals and Plan of Care (Insert Text)  Description: FUNCTIONAL STATUS PRIOR TO ADMISSION: Patient was independent and active without use of DME.     HOME SUPPORT PRIOR TO ADMISSION: The patient lived with his wife but did not require assist.    Physical Therapy Goals  Initiated 4/28/2021  1. Patient will move from supine to sit and sit to supine  in bed with independence within 5 days. 2.  Patient will perform sit to/from stand with supervision/set-up within 5 days. 3.  Patient will ambulate 150 feet with least restrictive assistive device and supervision/set-up within 5 days. 4.  Patient will ascend/descend 1 stair without handrail(s) with supervision/set-up within 5 days. 5.  Patient will perform cardiac exercises per protocol with independence within 5 days. 6.  Patient will verbally recall and functionally demonstrate mindful-based movements (\"move in the tube\") principles without cues within 5 days. Outcome: Progressing Towards Goal     PHYSICAL THERAPY EVALUATION  Patient: Jose Fernández (24 y.o. male)  Date: 4/28/2021  Primary Diagnosis: CAD (coronary artery disease) [I25.10]  Procedure(s) (LRB):  ON PUMP CORONARY ARTERY BYPASS GRAFT X 5  WITH LIMA, LEFT AND RIGHT SAPHENOUS VEIN HARVEST VIA EVH, NICK AND EPIAORTIC ULTRASOUND BY DR CULLEN [facility] (N/A) 2 Days Post-Op   Precautions: Fall, Sternal(\"move in the tube\")      ASSESSMENT  Based on the objective data described below, the patient presents with pain, decreased endurance, and decreased dynamic balance during ambulation. Patient began supine in bed on 2L/O2 and HR ~85 BPM. Log roll performed with verbal cues and CGA. SpO2 dropped to 88% on sitting and patient verbally cued to performed PLB. Gross assessment revealed functional strength in BLE. Sit <> stand performed w/minor forward translation, but static standing balance maintained.  Ambulation performed for 70ft w/portable O2 delivering 2L/min. Patient displayed slow shuffled gait and minimal arm swing. VC to relax posture to allow appropriate arm swing in BUE. During gait training noted HR variability and elevation to ~130 BPM. 1 standing rest break performed at 35 ft d/t patient reports of fatigue and HR returned to 89 BPM. SpO2 between 90 - 95% during ambulation and PLB performed. D/t presentation during evaluation, the patient would benefit from discharge with [facility] vs none pending progress. Patient demonstrated good functional mobility during the current evaluation with decreased endurance and balance noted. Patient reports slow/shuffled gait was not consistent with his baseline. Anticipate positive progress with skilled physical therapy intervention. Patient is verbalizing and is demonstrating understanding of mindful-based movements (\"move in the tube\") principles of keeping UEs proximal to ribcage to prevent lateral pull on the sternum during load-bearing activities with verbal cues required for compliance. Current Level of Function Impacting Discharge (mobility/balance): CGA for ambulation    Other factors to consider for discharge: desats on 2L/O2, CAD, independent PLOF     Patient will benefit from skilled therapy intervention to address the above noted impairments. PLAN :  Recommendations and Planned Interventions: bed mobility training, transfer training, gait training, therapeutic exercises, patient and family training/education, and therapeutic activities      Frequency/Duration: Patient will be followed by physical therapy:  daily to address goals.     Recommendation for discharge: (in order for the patient to meet his/her long term goals)  Physical therapy at least 2 days/week in the home vs none pending progress    This discharge recommendation:  Has been made in collaboration with the attending provider and/or case management    IF patient discharges home will need the following DME: none SUBJECTIVE:   Patient stated that's where they took my vein.     OBJECTIVE DATA SUMMARY:   Patient mobilized on continuous portable monitor/telemetry. HISTORY:    Past Medical History:   Diagnosis Date    CAD (coronary artery disease)     Chronic lower back pain     CVA (cerebral vascular accident) ([address]     HLD (hyperlipidemia)     HTN (hypertension)     Kidney stones     Thalassemia trait      Past Surgical History:   Procedure Laterality Date    HX HERNIA REPAIR Right     HX PROSTATE SURGERY      \"Urolift\"       Home Situation  Home Environment: Private residence  # Steps to Enter: (0)  One/Two Story Residence: Two story(Lives on the 1st floor, rarely goes upstairs)  Living Alone: No  Support Systems: Spouse/Significant Other/Partner  Patient Expects to be Discharged to[de-identified] Private residence  Current DME Used/Available at Home: Cane, straight  Tub or Shower Type: Shower    EXAMINATION/PRESENTATION/DECISION MAKING:     Critical Behavior:  Neurologic State: Drowsy  Orientation Level: Oriented X4  Cognition: Appropriate decision making, Appropriate for age attention/concentration  Safety/Judgement: Decreased insight into deficits    Range Of Motion:  AROM: Within functional limits     Strength:    Strength: Within functional limits     Tone & Sensation:   Sensation: Intact    Functional Mobility:  Bed Mobility:  Rolling: Contact guard assistance  Supine to Sit: Contact guard assistance  Sit to Supine: Moderate assistance  Scooting: Minimum assistance    Transfers:  Sit to Stand: Contact guard assistance  Stand to Sit: Contact guard assistance     Balance:   Sitting: Intact; Without support  Standing: Impaired; Without support  Standing - Static: Fair;Unsupported  Standing - Dynamic : Fair;Unsupported    Ambulation/Gait Training:  Distance (ft): 70 Feet (ft)  Assistive Device: Gait belt  Ambulation - Level of Assistance: Contact guard assistance  Gait Abnormalities: Altered arm swing;Shuffling gait  Base of Support: Widened  Speed/Caitlin: Shuffled; Slow  Step Length: Left shortened;Right shortened    Cardiac diagnosis intervention:  Patient instructed and educated on mindful movement principles based on Move in The Tube concept to include maintaining bilateral elbows close to rib cage when performing any load-bearing activity such as getting in/out of bed, pushing up from a chair, opening a door, or lifting a box. Patient was given a handout with diagrams of each correct/incorrect method of performing each of the above tasks. Therapeutic Exercises:   Patient instructed on the benefits. Instructed and indicated understanding on how to progress reps, sets against gravity, pacing through progressive muscle strengthening standing based on surgeon clearance for more weight in prep for basic and instrumental ADLs. Instruction on the use of household items in place of weights as needed. Physical Therapy Evaluation Charge Determination   History Examination Presentation Decision-Making   MEDIUM  Complexity : 1-2 comorbidities / personal factors will impact the outcome/ POC  MEDIUM Complexity : 3 Standardized tests and measures addressing body structure, function, activity limitation and / or participation in recreation  MEDIUM Complexity : Evolving with changing characteristics  MEDIUM Complexity : FOTO score of 26-74      Based on the above components, the patient evaluation is determined to be of the following complexity level: MEDIUM    Activity Tolerance:   Fair, desaturates with exertion and requires oxygen, and requires rest breaks    After treatment patient left in no apparent distress:   Sitting in chair, Call bell within reach, and Caregiver / family present    COMMUNICATION/EDUCATION:   The patients plan of care was discussed with: Physical therapist and Registered nurse. Patient/family agree to work toward stated goals and plan of care.     Thank you for this referral.  Smiley Shay Darryl, SPT   Time Calculation: 30 mins

## 2021-04-28 NOTE — PROGRESS NOTES
I have reviewed and agree with Terrie Whittington RN assessment, charting, and medication adminstration

## 2021-04-28 NOTE — PROGRESS NOTES
0800- Report received from ROYA Trinidad. Assumed care of patient. 0900-DMITRY Em at bedside. 0903-Patient in Afib in 120s with movement-patient was getting back to bed. Patient is asymptomatic.  0916-Patient converted to NSR in 70s-80s. 0935-Shikha NP at bedside. 0941-Patient in Afib 110s with movement-OT at bedside. 1000-DMITRY Em at bedside to pull chest tubes and was notified of increased CT output this morning. Will reassess this afternoon. 1012-Patient converted back to NSR in 70s. 1310-DMITRY Em at bedisde to remove chest tubes. 1330-Gregory catheter removed. Urinal and call bell provided if patient has to void. 1900-Patient assisted with using urinal-100ml output. 1910-Scant amount of drainage found from right SVG site. Bandage removed and periwound site cleaned. Incision clean,dry,intact. 2000-Bedside and Verbal shift change report given to Ken (oncoming nurse) by Mary Noland and ROYA Nam (offgoing nurse). Report included the following information SBAR, Kardex, OR Summary, Procedure Summary, Intake/Output, MAR, Accordion, Recent Results, Cardiac Rhythm NSR and Quality Measures.

## 2021-04-28 NOTE — PROGRESS NOTES
I have reviewed and agree with Gertrude Mclean RN charting, assessment, and medication adminstration

## 2021-04-29 ENCOUNTER — APPOINTMENT (OUTPATIENT)
Dept: GENERAL RADIOLOGY | Age: 71
DRG: 236 | End: 2021-04-29
Attending: NURSE PRACTITIONER
Payer: MEDICARE

## 2021-04-29 LAB
ALBUMIN SERPL-MCNC: 3.4 G/DL (ref 3.5–5)
ALBUMIN/GLOB SERPL: 1.4 {RATIO} (ref 1.1–2.2)
ALP SERPL-CCNC: 42 U/L (ref 45–117)
ALT SERPL-CCNC: 23 U/L (ref 12–78)
ANION GAP SERPL CALC-SCNC: 8 MMOL/L (ref 5–15)
AST SERPL-CCNC: 59 U/L (ref 15–37)
BILIRUB SERPL-MCNC: 1.4 MG/DL (ref 0.2–1)
BUN SERPL-MCNC: 20 MG/DL (ref 6–20)
BUN/CREAT SERPL: 19 (ref 12–20)
CALCIUM SERPL-MCNC: 8.5 MG/DL (ref 8.5–10.1)
CHLORIDE SERPL-SCNC: 97 MMOL/L (ref 97–108)
CO2 SERPL-SCNC: 25 MMOL/L (ref 21–32)
CREAT SERPL-MCNC: 1.08 MG/DL (ref 0.7–1.3)
ERYTHROCYTE [DISTWIDTH] IN BLOOD BY AUTOMATED COUNT: 15.6 % (ref 11.5–14.5)
GLOBULIN SER CALC-MCNC: 2.5 G/DL (ref 2–4)
GLUCOSE BLD STRIP.AUTO-MCNC: 160 MG/DL (ref 65–100)
GLUCOSE BLD STRIP.AUTO-MCNC: 165 MG/DL (ref 65–100)
GLUCOSE BLD STRIP.AUTO-MCNC: 196 MG/DL (ref 65–100)
GLUCOSE BLD STRIP.AUTO-MCNC: 205 MG/DL (ref 65–100)
GLUCOSE SERPL-MCNC: 156 MG/DL (ref 65–100)
HCT VFR BLD AUTO: 25.4 % (ref 36.6–50.3)
HGB BLD-MCNC: 8 G/DL (ref 12.1–17)
MAGNESIUM SERPL-MCNC: 2.2 MG/DL (ref 1.6–2.4)
MCH RBC QN AUTO: 20.8 PG (ref 26–34)
MCHC RBC AUTO-ENTMCNC: 31.5 G/DL (ref 30–36.5)
MCV RBC AUTO: 66 FL (ref 80–99)
NRBC # BLD: 0 K/UL (ref 0–0.01)
NRBC BLD-RTO: 0 PER 100 WBC
PLATELET # BLD AUTO: 122 K/UL (ref 150–400)
PMV BLD AUTO: 10.8 FL (ref 8.9–12.9)
POTASSIUM SERPL-SCNC: 3.8 MMOL/L (ref 3.5–5.1)
PROT SERPL-MCNC: 5.9 G/DL (ref 6.4–8.2)
RBC # BLD AUTO: 3.85 M/UL (ref 4.1–5.7)
SERVICE CMNT-IMP: ABNORMAL
SODIUM SERPL-SCNC: 130 MMOL/L (ref 136–145)
WBC # BLD AUTO: 7.6 K/UL (ref 4.1–11.1)

## 2021-04-29 PROCEDURE — 74011250637 HC RX REV CODE- 250/637: Performed by: NURSE PRACTITIONER

## 2021-04-29 PROCEDURE — 74011250637 HC RX REV CODE- 250/637: Performed by: INTERNAL MEDICINE

## 2021-04-29 PROCEDURE — 99232 SBSQ HOSP IP/OBS MODERATE 35: CPT | Performed by: CLINICAL NURSE SPECIALIST

## 2021-04-29 PROCEDURE — 74011250636 HC RX REV CODE- 250/636: Performed by: INTERNAL MEDICINE

## 2021-04-29 PROCEDURE — 74011636637 HC RX REV CODE- 636/637: Performed by: NURSE PRACTITIONER

## 2021-04-29 PROCEDURE — 97530 THERAPEUTIC ACTIVITIES: CPT

## 2021-04-29 PROCEDURE — 80053 COMPREHEN METABOLIC PANEL: CPT

## 2021-04-29 PROCEDURE — 83735 ASSAY OF MAGNESIUM: CPT

## 2021-04-29 PROCEDURE — 82962 GLUCOSE BLOOD TEST: CPT

## 2021-04-29 PROCEDURE — 74011000258 HC RX REV CODE- 258: Performed by: INTERNAL MEDICINE

## 2021-04-29 PROCEDURE — 74011250636 HC RX REV CODE- 250/636: Performed by: NURSE PRACTITIONER

## 2021-04-29 PROCEDURE — 74011000258 HC RX REV CODE- 258: Performed by: NURSE PRACTITIONER

## 2021-04-29 PROCEDURE — 97116 GAIT TRAINING THERAPY: CPT

## 2021-04-29 PROCEDURE — 36415 COLL VENOUS BLD VENIPUNCTURE: CPT

## 2021-04-29 PROCEDURE — 71045 X-RAY EXAM CHEST 1 VIEW: CPT

## 2021-04-29 PROCEDURE — 65660000001 HC RM ICU INTERMED STEPDOWN

## 2021-04-29 PROCEDURE — 85027 COMPLETE CBC AUTOMATED: CPT

## 2021-04-29 RX ORDER — POTASSIUM CHLORIDE 750 MG/1
20 TABLET, FILM COATED, EXTENDED RELEASE ORAL
Status: COMPLETED | OUTPATIENT
Start: 2021-04-29 | End: 2021-04-29

## 2021-04-29 RX ORDER — FUROSEMIDE 10 MG/ML
40 INJECTION INTRAMUSCULAR; INTRAVENOUS ONCE
Status: COMPLETED | OUTPATIENT
Start: 2021-04-29 | End: 2021-04-29

## 2021-04-29 RX ORDER — INSULIN GLARGINE 100 [IU]/ML
15 INJECTION, SOLUTION SUBCUTANEOUS DAILY
Status: DISCONTINUED | OUTPATIENT
Start: 2021-04-29 | End: 2021-04-30 | Stop reason: HOSPADM

## 2021-04-29 RX ORDER — METOPROLOL TARTRATE 25 MG/1
25 TABLET, FILM COATED ORAL EVERY 12 HOURS
Status: DISCONTINUED | OUTPATIENT
Start: 2021-04-29 | End: 2021-04-30

## 2021-04-29 RX ORDER — ALFUZOSIN HYDROCHLORIDE 10 MG/1
10 TABLET, EXTENDED RELEASE ORAL
Status: DISCONTINUED | OUTPATIENT
Start: 2021-04-30 | End: 2021-04-30 | Stop reason: HOSPADM

## 2021-04-29 RX ORDER — POTASSIUM CHLORIDE 750 MG/1
40 TABLET, FILM COATED, EXTENDED RELEASE ORAL
Status: COMPLETED | OUTPATIENT
Start: 2021-04-29 | End: 2021-04-29

## 2021-04-29 RX ADMIN — INSULIN LISPRO 2 UNITS: 100 INJECTION, SOLUTION INTRAVENOUS; SUBCUTANEOUS at 08:49

## 2021-04-29 RX ADMIN — FERROUS SULFATE TAB 325 MG (65 MG ELEMENTAL FE) 325 MG: 325 (65 FE) TAB at 08:51

## 2021-04-29 RX ADMIN — POTASSIUM CHLORIDE 40 MEQ: 750 TABLET, FILM COATED, EXTENDED RELEASE ORAL at 10:46

## 2021-04-29 RX ADMIN — FUROSEMIDE 40 MG: 10 INJECTION, SOLUTION INTRAMUSCULAR; INTRAVENOUS at 08:52

## 2021-04-29 RX ADMIN — DOCUSATE SODIUM 50 MG AND SENNOSIDES 8.6 MG 1 TABLET: 8.6; 5 TABLET, FILM COATED ORAL at 08:52

## 2021-04-29 RX ADMIN — FAMOTIDINE 20 MG: 20 TABLET ORAL at 08:51

## 2021-04-29 RX ADMIN — OXYCODONE 5 MG: 5 TABLET ORAL at 08:05

## 2021-04-29 RX ADMIN — ASPIRIN 81 MG: 81 TABLET, CHEWABLE ORAL at 09:03

## 2021-04-29 RX ADMIN — CHLORHEXIDINE GLUCONATE 10 ML: 1.2 RINSE ORAL at 08:49

## 2021-04-29 RX ADMIN — ACETAMINOPHEN 650 MG: 325 TABLET ORAL at 05:49

## 2021-04-29 RX ADMIN — METOPROLOL TARTRATE 25 MG: 25 TABLET, FILM COATED ORAL at 20:53

## 2021-04-29 RX ADMIN — AMIODARONE HYDROCHLORIDE 1 MG/MIN: 50 INJECTION, SOLUTION INTRAVENOUS at 08:59

## 2021-04-29 RX ADMIN — Medication 10 ML: at 21:00

## 2021-04-29 RX ADMIN — POLYETHYLENE GLYCOL 3350 17 G: 17 POWDER, FOR SOLUTION ORAL at 08:51

## 2021-04-29 RX ADMIN — AMIODARONE HYDROCHLORIDE 400 MG: 200 TABLET ORAL at 08:52

## 2021-04-29 RX ADMIN — Medication 400 MG: at 08:52

## 2021-04-29 RX ADMIN — MUPIROCIN: 20 OINTMENT TOPICAL at 08:48

## 2021-04-29 RX ADMIN — ACETAMINOPHEN 650 MG: 325 TABLET ORAL at 17:39

## 2021-04-29 RX ADMIN — POTASSIUM CHLORIDE 20 MEQ: 750 TABLET, FILM COATED, EXTENDED RELEASE ORAL at 06:37

## 2021-04-29 RX ADMIN — Medication 400 MG: at 17:39

## 2021-04-29 RX ADMIN — FAMOTIDINE 20 MG: 20 TABLET ORAL at 20:53

## 2021-04-29 RX ADMIN — Medication 10 ML: at 15:01

## 2021-04-29 RX ADMIN — Medication 10 ML: at 06:43

## 2021-04-29 RX ADMIN — INSULIN LISPRO 3 UNITS: 100 INJECTION, SOLUTION INTRAVENOUS; SUBCUTANEOUS at 12:35

## 2021-04-29 RX ADMIN — APIXABAN 5 MG: 5 TABLET, FILM COATED ORAL at 10:46

## 2021-04-29 RX ADMIN — METOPROLOL TARTRATE 25 MG: 25 TABLET, FILM COATED ORAL at 08:53

## 2021-04-29 RX ADMIN — AMIODARONE HYDROCHLORIDE 400 MG: 200 TABLET ORAL at 20:53

## 2021-04-29 RX ADMIN — DOCUSATE SODIUM 50 MG AND SENNOSIDES 8.6 MG 1 TABLET: 8.6; 5 TABLET, FILM COATED ORAL at 17:39

## 2021-04-29 RX ADMIN — AMIODARONE HYDROCHLORIDE 0.5 MG/MIN: 50 INJECTION, SOLUTION INTRAVENOUS at 16:33

## 2021-04-29 RX ADMIN — INSULIN GLARGINE 15 UNITS: 100 INJECTION, SOLUTION SUBCUTANEOUS at 15:05

## 2021-04-29 RX ADMIN — APIXABAN 5 MG: 5 TABLET, FILM COATED ORAL at 20:53

## 2021-04-29 RX ADMIN — ATORVASTATIN CALCIUM 40 MG: 40 TABLET, FILM COATED ORAL at 21:00

## 2021-04-29 RX ADMIN — MUPIROCIN: 20 OINTMENT TOPICAL at 17:40

## 2021-04-29 RX ADMIN — DEXTROSE 150 MG: 50 INJECTION, SOLUTION INTRAVENOUS at 06:37

## 2021-04-29 NOTE — PROGRESS NOTES
[facility] care of patient from [facility] 99    [phone] -130 afib post voiding in urinal. /64. On Amiodarone on 0.5mg IV. Self converted back into NSR rate 80s but did not maintain. Rate increased to 120-140. Dr. Mark Graham made aware and gave telephone order to give amiodarone bolus x1 now if patient dose not convert or rate does not go down increase drip to 1 after bolus. Give potassium 20meq PO x1 now    0800 Bedside and Verbal shift change report given to Viv PRYOR and Lee Reis (oncoming nurse) by Nataliya Mclean RN (offgoing nurse). Report included the following information SBAR, Kardex, MAR, Recent Results and Cardiac Rhythm afib.

## 2021-04-29 NOTE — PROGRESS NOTES
Physician Progress Note      PATIENT:               Felicity Muse  CSN #:                  [id number]  :                       1950  ADMIT DATE:       2021 5:20 AM  DISCH DATE:  RESPONDING  PROVIDER #:        Gisele Hurst NP          QUERY TEXT:    Patient admitted with  CAD . Noted documentation of CVA not noted if current or jut PMH . Please document the status of the CVA. Lysbeth Carrel The medical record reflects the following:  Risk Factors: HX of CVA  Clinical Indicators:    H/P  Acute CVA with visual field deficits: Multiple small infarcts seen on MRI. On asa, statin, plavix. Carotids ok. -PN  CVA with visual field deficits in March: Multiple small infarcts seen on MRI. 3/6 MRI  1. Small acute infarcts in the right corona radiata and right occipital lobe. Few punctate acute infarcts in the left occipital lobe. These are favored to be  embolic given distribution. 2. Minimal chronic microvascular ischemic disease. 3. Small area of encephalomalacia in the left inferior frontal lobe      Treatment:  asa, statin, plavix preop, resumed    Thank you,  Mahesh Veloz RN, CDI, CCDS  Certified Clinical   [phone]  Options provided:  -- CVA is PMH only  -- CVA present as evidenced by, Please document evidence. -- Other - I will add my own diagnosis  -- Disagree - Not applicable / Not valid  -- Disagree - Clinically unable to determine / Unknown  -- Refer to Clinical Documentation Reviewer    PROVIDER RESPONSE TEXT:    CVA is PMH only.     Query created by: Zoraida Leach on 2021 11:30 AM      Electronically signed by:  Gisele Hurst NP 2021 11:46 AM

## 2021-04-29 NOTE — PROGRESS NOTES
1545: Tabatha Chinchilla NP notified of decreased HR 56-60 with smaller pwaves than previous strips, orders received to decrease Amio gtt to 0.5mg/min. hold PM dose of metoprolol for HR <45    1930: pt agitated and stating \"I hear a party that's been going on for 2 hours. I'm pissed and this is unacceptable. I hear children and everyone's  is here. \" pt reoriented. Wife called by RN to notify and address concerns    2000: Bedside shift change report given to Vriginia Pastor (oncoming nurse) by Lazaro Uribe RN (offgoing nurse). Report included the following information SBAR, Intake/Output, MAR, Recent Results, Cardiac Rhythm NSR and Alarm Parameters . 2015: Dr. Jhoan Almeida notified of pts agitation/confusion. Pt assessed by MD, no new orders at this time    2045: pts daughter called unit, updated by RN.  Daughter states there may be some underlying undiagnosed Alzheimers and that he can be forgetful intermittently at bedside    I have reviewed and agree with Edel Robertson RN charting, assessment, and medication adminstration

## 2021-04-29 NOTE — PROGRESS NOTES
1945: Report received from Ozzy Combs, ROYA & Sharona Ronquillo RN. Amio (0.5) drip verified. Pt sitting up in chair, AOx4, and on 2L NC sating mid 90s. [facility] orders, awaiting bed assignment. 2015: Pt voided 125 mL in urinal clear w/ some small red flecks, yellow, and odorless urine. 2045: Blad scan (PVR) 109 mL.    2100: Pt audibly wheezing, RT called for PRN breathing treatment. 2200: Bedside and Verbal shift change report given to Josette Devries RN. Report included the following information SBAR, Intake/Output, MAR, Recent Results, Cardiac Rhythm NSR and Alarm Parameters .

## 2021-04-29 NOTE — PROGRESS NOTES
0800-Report received from Esther. Assumed care of patient. 0850-Patient states his wife will be bring his afluzosin this morning and would like to hold the flomax for now. Patient educated that we have to get the medication and approved and verified with our pharmacy before we can give the medication. 0930-Patient informed RN that he took the medication when it was brought this morning. 1030- Medication brought to our pharmacy to be verified and approved to be started tomorrow evening. 1140-Patient standing up with RN to do exercises given by OT. Patient tolerated well. 1400-PT at bedside. 1600-Patient returned to bed.

## 2021-04-29 NOTE — PROGRESS NOTES
[facility] ICU Progress Note    Admit Date: 2021  POD:  3 Day Post-Op    Procedure:  Procedure(s):  ON PUMP CORONARY ARTERY BYPASS GRAFT X 5  WITH LIMA, LEFT AND RIGHT SAPHENOUS VEIN HARVEST VIA EVH, NICK AND EPIAORTIC ULTRASOUND BY DR CULLEN [facility]        Subjective:   Pt seen with Dr. Rosalia Wallace. Pt up in chair. Afebrile, NC 2L. Cont amio gtt, in RVR 120s this morning - getting additional bolus      Objective:   Vitals:  Blood pressure 125/77, pulse 82, temperature 98 °F (36.7 °C), resp. rate 17, height 5' 3.75\" (1.619 m), weight 172 lb 1.6 oz (78.1 kg), SpO2 94 %. Temp (24hrs), Av.9 °F (37.2 °C), Min:98 °F (36.7 °C), Max:99.8 °F (37.7 °C)    EKG/Rhythm: SR     Oxygen Therapy:  Oxygen Therapy  O2 Sat (%): 94 % (21 0400)  Pulse via Oximetry: 83 beats per minute (21 2211)  O2 Device: Nasal cannula (21 0400)  Skin Assessment: Clean, dry, & intact (21 0800)  O2 Flow Rate (L/min): 2 l/min (21 0400)  FIO2 (%): 80 % (21 1400)    CXR:   CXR Results  (Last 48 hours)               21 0528  XR CHEST PORT Final result    Impression:  Interval removal of pulmonary artery catheter. Otherwise no change. Narrative:  Clinical history: Chest tube   INDICATION:   Chest tube   COMPARISON: 2021       FINDINGS:   AP portable upright view of the chest demonstrates a stable  cardiopericardial   silhouette. Mediastinal pleural drains remain. Pulmonary artery catheter has   been removed. Stable minimal basilar atelectasis and effusion. .There is no focal   consolidation. .There is no pneumothorax. . Patient is on a cardiac monitor. Admission Weight: Last Weight   Weight: 160 lb 15 oz (73 kg) Weight: 172 lb 1.6 oz (78.1 kg)     Intake / Output / Drain:  Current Shift: No intake/output data recorded.   Last 24 hrs.:     Intake/Output Summary (Last 24 hours) at 2021 0744  Last data filed at 2021 0600  Gross per 24 hour   Intake 1339.6 ml   Output 2550 ml   Net -1210.4 ml       EXAM:  General:  Pleasant, no distress                                                                                            Lungs:   Crackles in bases bilat   Incision:  Dsg C/D/I   Heart:  Regular rate and rhythm, S1, S2 normal, no murmur, click, or gallop. +rub but improved today   Abdomen:   Soft, non-tender. Bowel sounds hypoactive. No masses,  No organomegaly. Extremities:  No edema. Palpable pulses bilat    Neurologic:  Gross motor and sensory apparatus intact. Labs:   Recent Labs     21  0323 21  2105 21  1442 21  1442   WBC 7.6  --    < > 7.6   HGB 8.0*  --    < > 9.4*   HCT 25.4*  --    < > 30.4*   *  --    < > 125*   *  --    < > 144   K 3.8  --    < > 3.8   BUN 20  --    < > 16   CREA 1.08  --    < > 1.03   *  --    < > 127*   GLUCPOC  --  193*   < > 135*   INR  --   --   --  1.2*    < > = values in this interval not displayed. Assessment:     Active Problems:    CAD (coronary artery disease) (3/5/2021)      S/P CABG x 5 (2021)      Overview: CABG X 5 LIMA to LAD, RSVG to PDA, Sequential RSVG to Diag, OM1, OM2      LEFT AND RIGHT ENDOSCOPIC SAPHENOUS VEIN HARVEST       Plan/Recommendations/Medical Decision Makin. S/p CABG: on ASA, statin. Start low dose BB. 2. Atelectasis, small R effusion: increase IS use and activity, wean O2 to sats > 92%. give lasix 40 mg IV     3. Hypertension: Start low dose BB. Juan Dirk HR chronically low per pt - in high 40/50's preop    4. Hyperlipidemia: resumed statin    5. CVA with visual field deficits in March: Multiple small infarcts seen on MRI. On asa, statin. Carotids ok. D/c plavix, as will be on eliquis/asa for A fib. 6. BPH: improved since last visit. Had urolift in past. Had urology FU on , back on Uroxatral, sub flomax until family can bring med in.     7. Thalassemia trait: pt reports hx of and often times Hgb on lower side,     8. Acute postop anemia: Hgb 8.0, trend. Cont iron, also w/ Thalassemia trait per above    9. Thrombocytopenia:  Platelets up to 781E, cont asa. 10. Postop A fib w/ RVR:  On PO amio, on amio gtt at 1. Start metoprolol at 25 mg PO BID. Start eliquis 5 mg PO BID, & asa. Stop plavix. 11. Hyponatremia:  , cont diuresis. Weight up     12. Elevated Tbili: up to 1.4. Monitor. 13. Hyperglycemia:  HgbA1c 5.3. Stress response likely, cont BS ACHS, SSI per orders. Add lantus 15 units daily. Adjust supplements     Dispo: PT/OT. Transfer orders in to [facility], waiting on bed. Has AV Wires.      Signed By: Fortino Van NP

## 2021-04-29 NOTE — DIABETES MGMT
[facility]    CLINICAL NURSE SPECIALIST CONSULT     FOLLOW UP  NOTE    Initial Presentation   Lance Major is a 69 y.o. male admitted s/p CABG x5. Recent CVA s/p cardiac cath 3/2021. HX:   Past Medical History:   Diagnosis Date    CAD (coronary artery disease)     Chronic lower back pain     CVA (cerebral vascular accident) ([address]     HLD (hyperlipidemia)     HTN (hypertension)     Kidney stones     Thalassemia trait         Hospital course   Clinical progress has been uncomplicated post op.     4/28/21: transitioning off insulin infusion-basal given  4/29/21: hyperglycemia noted this AM; 196 @0833 (fasting) noted Ensure Enlive added to diet which could be contributing to higher blood glucose. Diabetes    Patient has NO diabetes-A1C 5.3%    Consulted by Provider for advanced diabetes nursing assessment and care, specifically related to   [x] Transitioning off Natalie Manner   [x] Inpatient management strategy  [] Home management assessment  [] Survival skill education    Diabetes-related medical history:N/A  Acute complications  NONE  Neurological complications  NONE  Microvascular disease  NONE  Macrovascular disease  CAD and Cerebral vascular accident  Other associated conditions     HTN/HLD    Diabetes medication history:NONE    Subjective   Sitting up at bedside- family member at bedside. Objective   Physical exam  General Alert, oriented and in no acute distress. Conversant and cooperative. Vital Signs   Visit Vitals  /79   Pulse (!) 127   Temp 98.5 °F (36.9 °C)   Resp 20   Ht 5' 3.75\" (1.619 m)   Wt 78.1 kg (172 lb 1.6 oz)   SpO2 96%   BMI 29.77 kg/m²     Skin  Warm and dry. Heart   Regular rate and rhythm.  No murmurs, rubs or gallops  Lungs  Clear to auscultation without rales or rhonchi  Extremities No foot wounds    Diabetic foot exam: N/A        Laboratory  BMP:   Lab Results   Component Value Date/Time     (L) 04/29/2021 03:23 AM    K 3.8 04/29/2021 03:23 AM    CL 97 04/29/2021 03:23 AM    CO2 25 04/29/2021 03:23 AM    AGAP 8 04/29/2021 03:23 AM     (H) 04/29/2021 03:23 AM    BUN 20 04/29/2021 03:23 AM    CREA 1.08 04/29/2021 03:23 AM    GFRAA >60 04/29/2021 03:23 AM    GFRNA >60 04/29/2021 03:23 AM      Factors impacting BG management  Factor Dose Comments   Nutrition:  Cardiac regular  Ensure Enlive suppletment        Drugs:  Other: antiarrhythmic-having afib w/ RVR   amiodarone        Pain Chronic back and post op    Other: glycemic control for post op stress response Insulin infusion-transitioning off 4/28/21 4/26/21: 16.2units     Blood glucose pattern        Assessment and Plan   Nursing Diagnosis Risk for unstable blood glucose pattern   Nursing Intervention Domain 5250 Decision-making Support   Nursing Interventions Examined current inpatient diabetes control   Explored factors facilitating and impeding inpatient management  Identified self-management practices impeding diabetes control  Explored corrective strategies with patient and responsible inpatient provider   Informed patient of rational for insulin strategy while hospitalized       Evaluation   This  male, with NO diabetes is s/p CABG x5. Recent A1C 5.3%. In March 2021, after cardiac cath he suffered a CVA so CABG was delayed. Since patient is NOT diabetic anticipate patient will not require additional insulin other than correctional after insulin infusion discontinued. Noted fasting hyperglycemia this .  4/28/21 PM  BG also 193. Noted supplement of Ensure Enlive added to diet orders which could be contributing to the hyperglycemia. Post op stress response from surgery could also be contributing to hyperglycemia. Would recommend adding low dose basal insulin if BG continue to trend >200mg/dl. PO intake adequate and patient stated he tried the supplement but did not drink it all. Recommendations   1.  Blood glucose monitoring ACHS once off insulin infusion. 2. Will reach out to Nutrition to switch him to Ensure High Protein supplement. 3. Since hyperglycemic on 2 POC checks, consider adding low dose basal -10units daily. Billing Code(s)   55586    Before making these care recommendations, I personally reviewed the hospitalization record, including notes, laboratory & diagnostic data and current medications, and   examined the patient at the bedside (circumstances permitting) before making care recommendations.      Total minutes: 7400 LIONEL Baumann  Diabetes Clinical Nurse Specialist  [facility]  Access via [employer]

## 2021-04-29 NOTE — PROGRESS NOTES
Problem: Self Care Deficits Care Plan (Adult)  Goal: *Acute Goals and Plan of Care (Insert Text)  Description: FUNCTIONAL STATUS PRIOR TO ADMISSION: Patient was independent and active without use of DME. Patient was active in his community playing pickle ball and walking several miles with his wife however has not been as active since 3/2021 with CVA and recent admission for CABG workup with CABG postponed due to neuro recommendations and HR recommendation of <100 bpm (per patient's wife). HOME SUPPORT: The patient lived with wife but did not require assist.    Occupational Therapy Goals  Initiated 4/27/2021  1. Patient will perform ADLs standing 5 mins without fatigue or LOB with supervision/set-up within 5 day(s). 2.  Patient will perform lower body ADLs with supervision/set-up within 5 day(s). 3.  Patient will perform gathering ADL items high and low 2/2 with supervision/set-up within 5 day(s). 4.  Patient will perform toilet transfers with supervision/set-up within 5 day(s). 5.  Patient will perform all aspects of toileting with supervision/set-up within 5 day(s). 6.  Patient will participate in cardiac/sternal upper extremity therapeutic exercise/activities to increase independence with ADLs with supervision/set-up for 5 minutes within 5 day(s). Outcome: Progressing Towards Goal   OCCUPATIONAL THERAPY TREATMENT  Patient: Natacha Holliday (34 y.o. male)  Date: 4/29/2021  Diagnosis: CAD (coronary artery disease) [I25.10] <principal problem not specified>  Procedure(s) (LRB):  ON PUMP CORONARY ARTERY BYPASS GRAFT X 5  WITH LIMA, LEFT AND RIGHT SAPHENOUS VEIN HARVEST VIA EVH, NICK AND EPIAORTIC ULTRASOUND BY DR eBto [place] (N/A) 3 Days Post-Op  Precautions: Fall, Sternal(\"move in the tube\")  Chart, occupational therapy assessment, plan of care, and goals were reviewed. ASSESSMENT  Patient continues with skilled OT services and is progressing towards goals.   Patient continues to be limited by generalized weakness, decreased endurance (2L NC O2), decreased activity tolerance, and decreased insight into deficits requiring verbal cues for maintaining sternal precautions. Patient requires CGA for functional transfers however is largely supervision for seated ADLs at this time. Continue to recommend HHOT to maximize patient safety. Patient is verbalizing and is demonstrating understanding of mindful-based movements (\"move in the tube\") principles of keeping UEs proximal to ribcage to prevent lateral pull on the sternum during load-bearing activities with verbal and tactile cues required for compliance. Current Level of Function Impacting Discharge (ADLs): CGA-supervision    Other factors to consider for discharge: O2 needs; insight into deficits; HR (tachy at times-no irregular HR this session)         PLAN :  Patient continues to benefit from skilled intervention to address the above impairments. Continue treatment per established plan of care to address goals. Recommend with staff: OOB x 3 to chair; BUE cardiac exercises x 2    Recommend next OT session: toilet transfers/toileting tasks     Recommendation for discharge: (in order for the patient to meet his/her long term goals)  Occupational therapy at least 2 days/week in the home     This discharge recommendation:  Has been made in collaboration with the attending provider and/or case management    IF patient discharges home will need the following DME: TBD       SUBJECTIVE:   Patient stated I need to remember not to use my elbows on the arm rests.     OBJECTIVE DATA SUMMARY:   Cognitive/Behavioral Status:  Neurologic State: Alert  Orientation Level: Oriented X4  Cognition: Appropriate for age attention/concentration  Perception: Appears intact  Perseveration: No perseveration noted  Safety/Judgement: Decreased insight into deficits    Functional Mobility and Transfers for ADLs:  Bed Mobility:   Not observed.      Transfers:  Sit to Stand: Contact guard assistance          Balance:  Sitting: Intact; Without support  Standing: Impaired; Without support  Standing - Static: Fair;Occasional  Standing - Dynamic : Fair;Occasional    ADL Intervention:                           Lower Body Dressing Assistance  Socks: Supervision; Compensatory technique training  Position Performed: Seated in chair         Cognitive Retraining  Safety/Judgement: Decreased insight into deficits    Patient instructed and educated on mindful movement principles based on Move in The Tube concept to include maintaining bilateral elbows close to rib cage when performing any load-bearing activity such as getting in/out of bed, pushing up from a chair, opening a door, or lifting a box. Patient was given a handout with diagrams of each correct/incorrect method of performing each of the above tasks. Patient instructed on the ability to utilize upper extremities outside the tube when doing any non-load bearing activity such as washing hair/body, brushing teeth, retrieving clothing items, or scratching your back. Patient encouraged to also perform upper extremity exercises \"outside of the tube\" to prevent scar tissue formation around sternal incision site. Patient instructed no asymmetrical reaching over head to ensure B UEs when shoulders >90* i.e. reaching in cabinets and dressing. Instruction on upper body dressing techniques of over head, then arms through to decrease pain and unilateral shoulder flexion >90*. Instruction on the benefits of utilizing B UEs during functional tasks i.e. opening the fridge, stepping into the tub. Instruction if continued pain at home with shoulder IR for BM hygiene can use wet wipes and toilet tongs PRN. Avoid valsalva maneuvers. May have to adjust home setup to increase ease with items closer to waist height to prevent deep bending and the automatic  of asymmetrical UE WB/pushing for stabilization during bending.   Benefit to don clothing tailor sitting and don all clothing while sitting prior to standing. Patient demonstrated lower body dressing with Supervision. Instruction and indicated understanding on the benefits of loose clothing throughout to accommodate for post surgical swelling, decreased ROM and increased pain. Instruction and indicated understanding the technique of pull over shirt versus front open clothing. Pain:  No complaints    Activity Tolerance:   Fair    After treatment patient left in no apparent distress:   Sitting in chair and Call bell within reach    COMMUNICATION/COLLABORATION:   The patients plan of care was discussed with: Physical therapist and Registered nurse.      Samuel Villa  Time Calculation: 18 mins

## 2021-04-29 NOTE — PROGRESS NOTES
Problem: Mobility Impaired (Adult and Pediatric)  Goal: *Acute Goals and Plan of Care (Insert Text)  Description: FUNCTIONAL STATUS PRIOR TO ADMISSION: Patient was independent and active without use of DME.     HOME SUPPORT PRIOR TO ADMISSION: The patient lived with his wife but did not require assist.    Physical Therapy Goals  Initiated 4/28/2021  1. Patient will move from supine to sit and sit to supine in bed with independence within 5 days. 2.  Patient will perform sit to/from stand with supervision/set-up within 5 days. 3.  Patient will ambulate 150 feet with least restrictive assistive device and supervision/set-up within 5 days. 4.  Patient will ascend/descend 1 stair without handrail(s) with supervision/set-up within 5 days. 5.  Patient will perform cardiac exercises per protocol with independence within 5 days. 6.  Patient will verbally recall and functionally demonstrate mindful-based movements (\"move in the tube\") principles without cues within 5 days. Outcome: Progressing Towards Goal     PHYSICAL THERAPY TREATMENT  Patient: Cb Yang (18 y.o. male)  Date: 4/29/2021  Diagnosis: CAD (coronary artery disease) [I25.10] <principal problem not specified>  Procedure(s) (LRB):  ON PUMP CORONARY ARTERY BYPASS GRAFT X 5  WITH LIMA, LEFT AND RIGHT SAPHENOUS VEIN HARVEST VIA EVH, NICK AND EPIAORTIC ULTRASOUND BY DR CULLEN [facility] (N/A) 3 Days Post-Op  Precautions: Fall, Sternal(\"move in the tube\")  Chart, physical therapy assessment, plan of care and goals were reviewed. ASSESSMENT  Patient continues with skilled PT services and is progressing towards goals. Patient presents to physical therapy w/decreased dynamic balance during gait. Patient started seated in a chair and excited to ambulate with PT. Sit <> stand CGA. Ambulation of 100 ft performed w/SBA. VS remained stable during gait training w/HR from 75 - 85 BPM, and O2 sats remained stable above 95% on 2L/O2.  Gait continues to appear slow and shuffled demonstrating a deviation from the patient's baseline. Progress noted during the current session with increased distance ambulated. D/t deviation from baseline, HHPT recommended on discharge to address impaired balance during gait. Patient is verbalizing and is demonstrating understanding of mindful-based movements (\"move in the tube\") principles of keeping UEs proximal to ribcage to prevent lateral pull on the sternum during load-bearing activities with verbal cues required for compliance. Current Level of Function Impacting Discharge (mobility/balance): CGA sit <> stand, SBA for gait         PLAN :  Patient continues to benefit from skilled intervention to address the above impairments. Continue treatment per established plan of care. to address goals. Recommendation for discharge: (in order for the patient to meet his/her long term goals)  Physical therapy at least 2 days/week in the home     This discharge recommendation:  Has been made in collaboration with the attending provider and/or case management    IF patient discharges home will need the following DME: none       SUBJECTIVE:   Patient stated I have two marine pillows.     OBJECTIVE DATA SUMMARY:   Patient mobilized on continuous portable monitor/telemetry. Critical Behavior:  Neurologic State: Alert  Orientation Level: Oriented X4  Cognition: Appropriate for age attention/concentration  Safety/Judgement: Decreased insight into deficits    Functional Mobility Training:    Transfers:  Sit to Stand: Contact guard assistance  Stand to Sit: Contact guard assistance    Balance:  Sitting: Intact  Standing: Impaired; Without support  Standing - Static: Fair  Standing - Dynamic : Fair    Ambulation/Gait Training:  Distance (ft): 100 Feet (ft)  Assistive Device: Gait belt  Ambulation - Level of Assistance: Stand-by assistance  Gait Abnormalities: Shuffling gait  Base of Support: Widened  Speed/Caitlin: Shuffled; Slow  Step Length: Left shortened;Right shortened    Cardiac diagnosis intervention:  Patient instructed and educated on mindful movement principles based on Move in The Tube concept to include maintaining bilateral elbows close to rib cage when performing any load-bearing activity such as getting in/out of bed, pushing up from a chair, opening a door, or lifting a box. Patient was given a handout with diagrams of each correct/incorrect method of performing each of the above tasks. Therapeutic Exercises:   Patient instructed on the benefits of cardiac exercises. Instructed and indicated understanding on how to progress reps, sets against gravity, pacing through progressive muscle strengthening standing based on surgeon clearance for more weight in prep for basic and instrumental ADLs. Instruction on the use of household items in place of weights as needed. Activity Tolerance:   Good, desaturates with exertion and requires oxygen, and requires rest breaks    After treatment patient left in no apparent distress:   Sitting in chair, Call bell within reach, and Caregiver / family present    COMMUNICATION/COLLABORATION:   The patients plan of care was discussed with: Physical therapist and Registered nurse.      Sandra Huddleston, SPT   Time Calculation: 24 mins

## 2021-04-29 NOTE — PROGRESS NOTES
Transitions of Care: 15% risk of re-admission      -The patient is anticipated to discharge home with [facility], have accepted for skilled nursing. May need HH PT orders   -Family to transport   -Cardiac Surgery follow-up     The CM spoke with the Cardiac Surgery NP, the patient may discharge over-the-weekend, patient in afib. The RN mentioned that the patient started on Eliquis- CM placed free 30-day Eliquis card on patient's hard chart and notified RN. The patient will need 2nd IM letter prior to discharge, will update [facility] on Friday regarding possible weekend discharge.      MAX Dumont

## 2021-04-30 ENCOUNTER — APPOINTMENT (OUTPATIENT)
Dept: GENERAL RADIOLOGY | Age: 71
DRG: 236 | End: 2021-04-30
Attending: NURSE PRACTITIONER
Payer: MEDICARE

## 2021-04-30 VITALS
DIASTOLIC BLOOD PRESSURE: 90 MMHG | WEIGHT: 167.4 LBS | HEIGHT: 64 IN | TEMPERATURE: 97.7 F | SYSTOLIC BLOOD PRESSURE: 113 MMHG | OXYGEN SATURATION: 93 % | HEART RATE: 75 BPM | BODY MASS INDEX: 28.58 KG/M2 | RESPIRATION RATE: 23 BRPM

## 2021-04-30 LAB
ALBUMIN SERPL-MCNC: 3.2 G/DL (ref 3.5–5)
ALBUMIN/GLOB SERPL: 1.2 {RATIO} (ref 1.1–2.2)
ALP SERPL-CCNC: 88 U/L (ref 45–117)
ALT SERPL-CCNC: 43 U/L (ref 12–78)
ANION GAP SERPL CALC-SCNC: 7 MMOL/L (ref 5–15)
AST SERPL-CCNC: 62 U/L (ref 15–37)
BILIRUB SERPL-MCNC: 1.2 MG/DL (ref 0.2–1)
BUN SERPL-MCNC: 20 MG/DL (ref 6–20)
BUN/CREAT SERPL: 20 (ref 12–20)
CALCIUM SERPL-MCNC: 8.6 MG/DL (ref 8.5–10.1)
CHLORIDE SERPL-SCNC: 101 MMOL/L (ref 97–108)
CO2 SERPL-SCNC: 25 MMOL/L (ref 21–32)
CREAT SERPL-MCNC: 1.02 MG/DL (ref 0.7–1.3)
ERYTHROCYTE [DISTWIDTH] IN BLOOD BY AUTOMATED COUNT: 15.6 % (ref 11.5–14.5)
GLOBULIN SER CALC-MCNC: 2.7 G/DL (ref 2–4)
GLUCOSE BLD STRIP.AUTO-MCNC: 115 MG/DL (ref 65–100)
GLUCOSE BLD STRIP.AUTO-MCNC: 157 MG/DL (ref 65–100)
GLUCOSE SERPL-MCNC: 129 MG/DL (ref 65–100)
HCT VFR BLD AUTO: 23.5 % (ref 36.6–50.3)
HGB BLD-MCNC: 7.5 G/DL (ref 12.1–17)
MAGNESIUM SERPL-MCNC: 2.3 MG/DL (ref 1.6–2.4)
MCH RBC QN AUTO: 21.1 PG (ref 26–34)
MCHC RBC AUTO-ENTMCNC: 31.9 G/DL (ref 30–36.5)
MCV RBC AUTO: 66.2 FL (ref 80–99)
NRBC # BLD: 0 K/UL (ref 0–0.01)
NRBC BLD-RTO: 0 PER 100 WBC
PLATELET # BLD AUTO: 125 K/UL (ref 150–400)
PMV BLD AUTO: 11.2 FL (ref 8.9–12.9)
POTASSIUM SERPL-SCNC: 4.5 MMOL/L (ref 3.5–5.1)
PROT SERPL-MCNC: 5.9 G/DL (ref 6.4–8.2)
RBC # BLD AUTO: 3.55 M/UL (ref 4.1–5.7)
SERVICE CMNT-IMP: ABNORMAL
SERVICE CMNT-IMP: ABNORMAL
SODIUM SERPL-SCNC: 133 MMOL/L (ref 136–145)
WBC # BLD AUTO: 5.2 K/UL (ref 4.1–11.1)

## 2021-04-30 PROCEDURE — 97110 THERAPEUTIC EXERCISES: CPT

## 2021-04-30 PROCEDURE — 97116 GAIT TRAINING THERAPY: CPT

## 2021-04-30 PROCEDURE — 97535 SELF CARE MNGMENT TRAINING: CPT

## 2021-04-30 PROCEDURE — 36415 COLL VENOUS BLD VENIPUNCTURE: CPT

## 2021-04-30 PROCEDURE — 85027 COMPLETE CBC AUTOMATED: CPT

## 2021-04-30 PROCEDURE — 82962 GLUCOSE BLOOD TEST: CPT

## 2021-04-30 PROCEDURE — 74011636637 HC RX REV CODE- 636/637: Performed by: NURSE PRACTITIONER

## 2021-04-30 PROCEDURE — 80053 COMPREHEN METABOLIC PANEL: CPT

## 2021-04-30 PROCEDURE — 74011250637 HC RX REV CODE- 250/637: Performed by: NURSE PRACTITIONER

## 2021-04-30 PROCEDURE — 71046 X-RAY EXAM CHEST 2 VIEWS: CPT

## 2021-04-30 PROCEDURE — 83735 ASSAY OF MAGNESIUM: CPT

## 2021-04-30 RX ORDER — AMIODARONE HYDROCHLORIDE 200 MG/1
400 TABLET ORAL EVERY 12 HOURS
Qty: 84 TAB | Refills: 0 | Status: SHIPPED | OUTPATIENT
Start: 2021-04-30 | End: 2021-06-01

## 2021-04-30 RX ORDER — POLYETHYLENE GLYCOL 3350 17 G/17G
17 POWDER, FOR SOLUTION ORAL
Qty: 14 PACKET | Refills: 0 | Status: SHIPPED | OUTPATIENT
Start: 2021-04-30 | End: 2021-05-14

## 2021-04-30 RX ORDER — METOPROLOL TARTRATE 25 MG/1
12.5 TABLET, FILM COATED ORAL 2 TIMES DAILY
Qty: 30 TAB | Refills: 1 | Status: SHIPPED | OUTPATIENT
Start: 2021-04-30 | End: 2021-06-29

## 2021-04-30 RX ORDER — METOPROLOL TARTRATE 25 MG/1
12.5 TABLET, FILM COATED ORAL EVERY 12 HOURS
Status: DISCONTINUED | OUTPATIENT
Start: 2021-04-30 | End: 2021-04-30 | Stop reason: HOSPADM

## 2021-04-30 RX ORDER — OXYCODONE HYDROCHLORIDE 5 MG/1
5 TABLET ORAL
Qty: 20 TAB | Refills: 0 | Status: SHIPPED | OUTPATIENT
Start: 2021-04-30 | End: 2021-05-06 | Stop reason: ALTCHOICE

## 2021-04-30 RX ORDER — LANOLIN ALCOHOL/MO/W.PET/CERES
6 CREAM (GRAM) TOPICAL
Status: DISCONTINUED | OUTPATIENT
Start: 2021-04-30 | End: 2021-04-30 | Stop reason: HOSPADM

## 2021-04-30 RX ORDER — ADHESIVE BANDAGE
30 BANDAGE TOPICAL DAILY PRN
Status: DISCONTINUED | OUTPATIENT
Start: 2021-04-30 | End: 2021-04-30 | Stop reason: HOSPADM

## 2021-04-30 RX ORDER — AMOXICILLIN 250 MG
1 CAPSULE ORAL
Qty: 28 TAB | Refills: 0 | Status: SHIPPED | OUTPATIENT
Start: 2021-04-30 | End: 2021-05-14

## 2021-04-30 RX ORDER — ACETAMINOPHEN 325 MG/1
650 TABLET ORAL
Qty: 30 TAB | Refills: 0 | Status: SHIPPED
Start: 2021-04-30

## 2021-04-30 RX ORDER — LANOLIN ALCOHOL/MO/W.PET/CERES
325 CREAM (GRAM) TOPICAL
Qty: 30 TAB | Refills: 0 | Status: SHIPPED | OUTPATIENT
Start: 2021-05-01 | End: 2021-05-31

## 2021-04-30 RX ADMIN — INSULIN LISPRO 2 UNITS: 100 INJECTION, SOLUTION INTRAVENOUS; SUBCUTANEOUS at 13:37

## 2021-04-30 RX ADMIN — MUPIROCIN: 20 OINTMENT TOPICAL at 09:00

## 2021-04-30 RX ADMIN — APIXABAN 5 MG: 5 TABLET, FILM COATED ORAL at 09:02

## 2021-04-30 RX ADMIN — DOCUSATE SODIUM 50 MG AND SENNOSIDES 8.6 MG 1 TABLET: 8.6; 5 TABLET, FILM COATED ORAL at 09:02

## 2021-04-30 RX ADMIN — METOPROLOL TARTRATE 12.5 MG: 25 TABLET, FILM COATED ORAL at 09:03

## 2021-04-30 RX ADMIN — AMIODARONE HYDROCHLORIDE 400 MG: 200 TABLET ORAL at 09:02

## 2021-04-30 RX ADMIN — FAMOTIDINE 20 MG: 20 TABLET ORAL at 09:02

## 2021-04-30 RX ADMIN — FERROUS SULFATE TAB 325 MG (65 MG ELEMENTAL FE) 325 MG: 325 (65 FE) TAB at 09:03

## 2021-04-30 RX ADMIN — POLYETHYLENE GLYCOL 3350 17 G: 17 POWDER, FOR SOLUTION ORAL at 09:03

## 2021-04-30 RX ADMIN — Medication 400 MG: at 09:03

## 2021-04-30 RX ADMIN — INSULIN GLARGINE 15 UNITS: 100 INJECTION, SOLUTION SUBCUTANEOUS at 09:03

## 2021-04-30 RX ADMIN — ASPIRIN 81 MG: 81 TABLET, CHEWABLE ORAL at 09:03

## 2021-04-30 NOTE — PROGRESS NOTES
PHYSICAL THERAPY TREATMENT/DISCHARGE  Patient: Christy Fontan (89 y.o. male)  Date: 4/30/2021  Diagnosis: CAD (coronary artery disease) [I25.10] <principal problem not specified>  Procedure(s) (LRB):  ON PUMP CORONARY ARTERY BYPASS GRAFT X 5  WITH LIMA, LEFT AND RIGHT SAPHENOUS VEIN HARVEST VIA EVH, NICK AND EPIAORTIC ULTRASOUND BY DR Caren Taveras (N/A) 4 Days Post-Op  Precautions: Fall, Sternal(\"move in the tube\")  Chart, physical therapy assessment, plan of care and goals were reviewed. ASSESSMENT  Patient continues with skilled PT services and has progressed towards goals. Patient presents to physical therapy with decreased dynamic balance during ambulation. Patient received seated in a chair and excited to work with PT. Functional mobility performed independently and supervision for ambulation of 150 ft. Positive progress noted with ambulation d/t increased gait speed, decreased MCCLAIN, and stable HR at 84 BPM.     D/t current presentation, recommend HHPT on discharge to address decreased balance during ambulation. Patient is verbalizing and is demonstrating understanding of mindful-based movements (\"move in the tube\") principles of keeping UEs proximal to ribcage to prevent lateral pull on the sternum during load-bearing activities with verbal cues required for compliance. Other factors to consider for discharge: CAD         PLAN :  Patient will be discharged from acute skilled physical therapy at this time. Rationale for discharge:  Goals achieved    Recommendation for discharge: (in order for the patient to meet his/her long term goals)  Physical therapy at least 2 days/week in the home     This discharge recommendation:  Has been made in collaboration with the attending provider and/or case management    IF patient discharges home will need the following DME: none       SUBJECTIVE:   Patient stated I think I'm going home either today or tomorrow.     OBJECTIVE DATA SUMMARY:   Critical Behavior:  Neurologic State: Alert  Orientation Level: Oriented X4  Cognition: Appropriate for age attention/concentration  Safety/Judgement: Decreased insight into deficits, Decreased awareness of need for safety  Functional Mobility Training:  Transfers:  Sit to Stand: Independent  Stand to Sit: Independent     Balance:  Sitting: Intact  Standing: Impaired  Standing - Static: Good  Standing - Dynamic : Fair     Ambulation/Gait Training:  Distance (ft): 150 Feet (ft)  Assistive Device: Gait belt  Ambulation - Level of Assistance: Supervision  Gait Abnormalities: Shuffling gait  Base of Support: Widened  Speed/Caitlin: Shuffled; Slow  Step Length: Left shortened;Right shortened    Cardiac diagnosis intervention:  Patient instructed and educated on mindful movement principles based on Move in The Tube concept to include maintaining bilateral elbows close to rib cage when performing any load-bearing activity such as getting in/out of bed, pushing up from a chair, opening a door, or lifting a box. Patient was given a handout with diagrams of each correct/incorrect method of performing each of the above tasks. Therapeutic Exercises:   Patient instructed on the benefits and demonstrated cardiac exercises. Instructed and indicated understanding on how to progress reps, sets against gravity, pacing through progressive muscle strengthening standing based on surgeon clearance for more weight in prep for basic and instrumental ADLs. Instruction on the use of household items in place of weights as needed. Activity Tolerance:   Good and requires rest breaks    After treatment patient left in no apparent distress:   Sitting in chair, Call bell within reach and Caregiver / family present    COMMUNICATION/COLLABORATION:   The patients plan of care was discussed with: Physical therapist and Registered nurse.      Terrell Kussmaul, SPT   Time Calculation: 11 mins

## 2021-04-30 NOTE — PROGRESS NOTES
[facility] ICU Progress Note    Admit Date: 2021  POD:  4 Day Post-Op    Procedure:  Procedure(s):  ON PUMP CORONARY ARTERY BYPASS GRAFT X 5  WITH LIMA, LEFT AND RIGHT SAPHENOUS VEIN HARVEST VIA EVH, NICK AND EPIAORTIC ULTRASOUND BY DR CULLEN [facility]        Subjective:   Pt seen with Dr. Hector Wolfe. Pt up in chair. Confused/agitated yesterday evening and overnight, not sleeping well but back to his baseline this morning. Afebrile, RA. +BM this morning     Objective:   Vitals:  Blood pressure 104/65, pulse (!) 55, temperature 98.3 °F (36.8 °C), resp. rate 15, height 5' 3.75\" (1.619 m), weight 167 lb 6.4 oz (75.9 kg), SpO2 (!) 89 %. Temp (24hrs), Av.1 °F (36.7 °C), Min:97.6 °F (36.4 °C), Max:98.3 °F (36.8 °C)    EKG/Rhythm: SR vs SB, PAF      Oxygen Therapy:  Oxygen Therapy  O2 Sat (%): (!) 89 % (21 0600)  Pulse via Oximetry: 83 beats per minute (21 2211)  O2 Device: None (Room air) (21 0600)  Skin Assessment: Clean, dry, & intact (21 0800)  O2 Flow Rate (L/min): 2 l/min (21 0000)  FIO2 (%): 80 % (21 1400)    CXR:   CXR Results  (Last 48 hours)               21 0844  XR CHEST PA LAT Final result    Impression:      Decreased diffuse interstitial opacities. Stable mild bibasilar opacities. Small   right greater than left pleural effusions. Narrative:  EXAM:  XR CHEST PA LAT       INDICATION: Mild diffuse interstitial opacities and bibasilar opacities. COMPARISON: 2021 at 0434 hours       TECHNIQUE: PA and lateral chest views       FINDINGS: The cardiomediastinal contours are stable. Mild diffuse interstitial opacities are decreased. There are stable mild   bibasilar opacities. There are small right greater than left pleural effusions. There is no pneumothorax. The bones and upper abdomen are stable. 21 0501  XR CHEST PORT Final result    Impression:      Stable mild diffuse interstitial opacities and bibasilar atelectasis. Narrative:  EXAM:  XR CHEST PORT       INDICATION: Bibasilar opacities. COMPARISON: 2021 at 0450 hours       TECHNIQUE: Portable AP semiupright chest view at 0434 hours       FINDINGS: The mediastinal drains and left chest tube have been removed. The   cardiomediastinal contours are stable. There is stable mild diffuse interstitial opacities and bibasilar atelectasis. There is no pleural effusion or pneumothorax. The bones and upper abdomen are   stable. Admission Weight: Last Weight   Weight: 160 lb 15 oz (73 kg) Weight: 167 lb 6.4 oz (75.9 kg)     Intake / Output / Drain:  Current Shift: No intake/output data recorded. Last 24 hrs.:     Intake/Output Summary (Last 24 hours) at 2021 0924  Last data filed at 2021 0648  Gross per 24 hour   Intake 646.66 ml   Output 5 ml   Net -1378.34 ml       EXAM:  General:  Pleasant, no distress                                                                                            Lungs:   Crackles resolved, slightly diminished in R base otherwise clear   Incision:  No erythema, drainage or swelling   Heart:  Regular rate and rhythm, S1, S2 normal, no murmur, rub, click, or gallop. Abdomen:   Soft, non-tender. Bowel sounds hypoactive. No masses,  No organomegaly. Extremities:  No edema. Palpable pulses bilat    Neurologic:  Gross motor and sensory apparatus intact. Labs:   Recent Labs     21  0910 21  0239   WBC  --  5.2   HGB  --  7.5*   HCT  --  23.5*   PLT  --  125*   NA  --  133*   K  --  4.5   BUN  --  20   CREA  --  1.02   GLU  --  129*   GLUCPOC 115*  --         Assessment:     Active Problems:    CAD (coronary artery disease) (3/5/2021)      S/P CABG x 5 (2021)      Overview: CABG X 5 LIMA to LAD, RSVG to PDA, Sequential RSVG to Diag, OM1, OM2      LEFT AND RIGHT ENDOSCOPIC SAPHENOUS VEIN HARVEST       Plan/Recommendations/Medical Decision Makin. S/p CABG: on ASA, statin.  Cont metoprolol but decrease dose - BP low overnight    2. Atelectasis, small R effusion: increase IS use and activity, weaned to RA. Xray improved, hold diuretics today due to lower BP, diurese prn     3. Hypertension: Cont low dose BB. HR chronically low per pt - in high 40/50's preop    4. Hyperlipidemia: resumed statin    5. CVA with visual field deficits in March: Multiple small infarcts seen on MRI. On asa, statin. Carotids ok. D/c plavix, as will be on eliquis/asa for A fib. 6. BPH: improved since last visit. Had urolift in past. Had urology FU on 4/20, back on Uroxatral    7. Thalassemia trait: pt reports hx of and often times Hgb on lower side,     8. Acute postop anemia: Hgb 7.5, trend. Cont iron, also w/ Thalassemia trait per above    9. Thrombocytopenia: Platelets up to 006P, cont asa/Eliquis. 10. Postop A fib w/ RVR: On PO amio, gtt dc'd last night due to bradycardia. Decrease metoprolol to 12.5 mg PO BID. Cont eliquis 5 mg PO BID, & asa.     11. Hyponatremia: , cont diuresis prn. Weight improved    12. Elevated Tbili: up to 1.2. Monitor. 13. Hyperglycemia: HgbA1c 5.3. Stress response likely, cont BS ACHS, SSI per orders. Cont lantus 15 units daily. Adjust supplements/consistent carb diet      Dispo: PT/OT. Transfer orders in to [facility], but likely d/c home this afternoon if HR remains stable.  D/c AV wires     Signed By: Jacklyn Rich NP

## 2021-04-30 NOTE — PROGRESS NOTES
Bedside and Verbal shift change report given to [facility] and  (oncoming nurse) by Karan Thurman RN (offgoing nurse). Report included the following information SBAR, Kardex, [facility], Recent Results and Cardiac Rhythm afib / [place].     2010 Dr. Bisi Ng bedside. 2100 RN able to decompress patient. Appears very anxious. RN discussed his concerns and was able to answer all questions. Patient appeared much more calm post     2200 Post metoprolol pressures have dropped to 81/54 (64) with rates of 46-50, during sleep. 2240 Patient pressure is 81/64. RN to recheck and patient refused. He pulled cuff off and said \"go away\" and \"not to touch him\". HR was 35. Stopped Amio and placed patient on DDD 60/10/10. Informed Dr. Bisi Ng.    2250 Turned pacer up to 74/10/10. Patient would not allow me to get a pulse oximetry on him.    0100 Patient awake. Agreed to BP and pulse ox.     0344 RN found patient ambulating to bathroom without assistance. Patient instructed to not get up without RN assistance. Patient educated importance, I/e lines and still attached to pacemaker. Bed alarm placed. 0355 HR 90 and competing with pacemaker. Turned pacing down to VVI 50/10    0400 Patient back into afib with rate of 100, ? Straining while having BM?    0448 Patient converted to NSR with Rates in the 50-60's    0800 Bedside and Verbal shift change report given to Jose Ortega 89 and  (oncoming nurse) by Taqueria Juan (offgoing nurse).  Report included the following information SBAR, Kardex, MAR, Recent Results and Cardiac Rhythm afib / NSR.

## 2021-04-30 NOTE — PROGRESS NOTES
Problem: Self Care Deficits Care Plan (Adult)  Goal: *Acute Goals and Plan of Care (Insert Text)  Description: FUNCTIONAL STATUS PRIOR TO ADMISSION: Patient was independent and active without use of DME. Patient was active in his community playing pickle ball and walking several miles with his wife however has not been as active since 3/2021 with CVA and recent admission for CABG workup with CABG postponed due to neuro recommendations and HR recommendation of <100 bpm (per patient's wife). HOME SUPPORT: The patient lived with wife but did not require assist.    Occupational Therapy Goals  Initiated 4/27/2021  1. Patient will perform ADLs standing 5 mins without fatigue or LOB with supervision/set-up within 5 day(s). 2.  Patient will perform lower body ADLs with supervision/set-up within 5 day(s). 3.  Patient will perform gathering ADL items high and low 2/2 with supervision/set-up within 5 day(s). 4.  Patient will perform toilet transfers with supervision/set-up within 5 day(s). 5.  Patient will perform all aspects of toileting with supervision/set-up within 5 day(s). 6.  Patient will participate in cardiac/sternal upper extremity therapeutic exercise/activities to increase independence with ADLs with supervision/set-up for 5 minutes within 5 day(s). Outcome: Resolved/Met   OCCUPATIONAL THERAPY TREATMENT/DISCHARGE  Patient: Juan Diego Betancourt (67 y.o. male)  Date: 4/30/2021  Diagnosis: CAD (coronary artery disease) [I25.10] <principal problem not specified>  Procedure(s) (LRB):  ON PUMP CORONARY ARTERY BYPASS GRAFT X 5  WITH LIMA, LEFT AND RIGHT SAPHENOUS VEIN HARVEST VIA EVH, NICK AND EPIAORTIC ULTRASOUND BY DR CULLEN [facility] (N/A) 4 Days Post-Op  Precautions: Fall, Sternal(\"move in the tube\")  Chart, occupational therapy assessment, plan of care, and goals were reviewed. ASSESSMENT  Patient continues with skilled OT services and is progressing towards goals.   Patient completed 6/6 BUE cardiac exercises standing and receptive to review of all education regarding compensatory strategies to be used while performing ADLs post discharge. Patient's wife present and also receptive and asking appropriate questions as needed. Patient continues to be limited by intermittent confusion, generalized weakness, Afib, and decreased endurance. Continue to recommend [address] post discharge. Patient is verbalizing and is demonstrating understanding of mindful-based movements (\"move in the tube\") principles of keeping UEs proximal to ribcage to prevent lateral pull on the sternum during load-bearing activities with verbal cues required for compliance. Current Level of Function (ADLs/self-care): largely supervision    Other factors to consider for discharge: may be underlying dementia per chart         PLAN :  Rationale for discharge: Goals achieved  Recommend with staff: OOB x 3 to chair; BUE cardiac exercises x 2  Recommendation for discharge: (in order for the patient to meet his/her long term goals)  Occupational therapy at least 2 days/week in the home     This discharge recommendation:  Has been made in collaboration with the attending provider and/or case management    IF patient discharges home will need the following DME:none       SUBJECTIVE:   Patient stated Stoney Dulce Maria will be sleeping in a chair for awhile.     OBJECTIVE DATA SUMMARY:   Cognitive/Behavioral Status:  Neurologic State: Alert  Orientation Level: Oriented X4  Cognition: Appropriate for age attention/concentration  Perception: Appears intact  Perseveration: No perseveration noted  Safety/Judgement: Decreased insight into deficits; Decreased awareness of need for safety    Functional Mobility and Transfers for ADLs:  Bed Mobility:   Not observed. Transfers:  Sit to Stand: Supervision(cues for \"move in the tube\")          Balance:  Sitting: Intact  Standing: Impaired; Without support  Standing - Static: Good  Standing - Dynamic : Fair    ADL Intervention: Cognitive Retraining  Safety/Judgement: Decreased insight into deficits; Decreased awareness of need for safety    Patient instructed and educated on mindful movement principles based on Move in The Tube concept to include maintaining bilateral elbows close to rib cage when performing any load-bearing activity such as getting in/out of bed, pushing up from a chair, opening a door, or lifting a box. Patient was given a handout with diagrams of each correct/incorrect method of performing each of the above tasks. Patient instructed on the ability to utilize upper extremities outside the tube when doing any non-load bearing activity such as washing hair/body, brushing teeth, retrieving clothing items, or scratching your back. Patient encouraged to also perform upper extremity exercises \"outside of the tube\" to prevent scar tissue formation around sternal incision site. Patient instructed in detail about activities to heed with caution, allowing pain to be the guide. These activities include but are not limited to: mowing the lawn, riding a bike, walking a dog, lifting a child, workshop hobbies, golfing, sexual activity, vacuuming, fishing, scrubbing the floors, and moving furniture. Patient was given the [address] in the [place] handout to describe each of these activities in detail. Patient instructed no asymmetrical reaching over head to ensure B UEs when shoulders >90* i.e. reaching in cabinets and dressing. Instruction on upper body dressing techniques of over head, then arms through to decrease pain and unilateral shoulder flexion >90*. Instruction on the benefits of utilizing B UEs during functional tasks i.e. opening the fridge, stepping into the tub. Instruction if continued pain at home with shoulder IR for BM hygiene can use wet wipes and toilet tongs PRN. Avoid valsalva maneuvers.   Instruction and indicated understanding on the benefits of loose clothing throughout to accommodate for post surgical swelling, decreased ROM and increased pain. Instruction and indicated understanding the technique of pull over shirt versus front open clothing. Increase activity tolerance for home, work, and sexual intercourse by pacing self with increasing the arm exercises, sitting duration, frequency OOB, walking, standing, and ADLs. Instructed and indicated understanding of s/s of too much activity, how to respond to s/s safely. Therapeutic Exercises:   Patient instructed on the benefits and demonstrated cardiac exercises while standing with Stand-by assistance. Instructed and indicated understanding on how to progress reps, sets against gravity, pacing through progressive muscle strengthening standing based on surgeon clearance for more weight in prep for basic and instrumental ADLs.     CARDIAC   EXERCISE    Sets    Reps    Active  Active Assist    Passive  Self ROM    Comments    Shoulder flexion  1  5   [x]                            []                             []                             []                                Shoulder abduction  1  5  [x]                             []                             []                             []                                Scapular elevation  1  5  [x]                             []                              []                             []                                Scapular retraction  1  5  [x]                             []                             []                             []                                Trunk rotation  1  5  [x]                             []                             []                             []                                Trunk sidebending  1  5  [x]                             []                              []                             []                                          Pain:  No complaints    Activity Tolerance:   Good    After treatment patient left in no apparent distress:   Sitting in chair, Call bell within reach, and Caregiver / family present    COMMUNICATION/COLLABORATION:   The patients plan of care was discussed with: Physical therapist and Registered nurse.      Ozzie Villa  Time Calculation: 26 mins

## 2021-04-30 NOTE — DISCHARGE INSTRUCTIONS
[facility]    [address]  [address]                                                           [address], [address] [facility]  Office- [phone]  Fax- [phone]        Office- [phone]  Fax- [phone]  _______________________________________________________________  Dr. Kei Mittal, NP Jose Juárez, [address]  Dr. Ritesh Wiley, NP  Regino Alonso, DMITRY Ding, NP Yael Barry, [address]              Abhilash Gallagher, ONUR Finney, [address]      Candice Raines NP                                        Name:[address]     Surgery & Date: Procedure(s):  ON PUMP CORONARY ARTERY BYPASS GRAFT X 5  WITH LIMA, LEFT AND RIGHT SAPHENOUS VEIN HARVEST VIA EVH, NICK AND EPIAORTIC ULTRASOUND BY DR CULLEN [facility]    Discharge Date: 4/30/21    MEDICATIONS:  Please refer to your After Visit Summary for your medication list. If you do not have a prescription for a new medication, you may purchase the medication over the counter. DO NOT TAKE ANY MEDICATIONS THAT ARE NOT ON THIS LIST    INSTRUCTIONS:  1. NO SMOKING OR TOBACCO PRODUCTS  2. Follow all the instructions in your discharge book  3. You may shower. Wash all incisions twice daily with mild soap and water. No lotions, ointments or powder. 4. Call the office immediately for any redness, swelling, or drainage from your incision. 5. Take your temperature daily and call for a temperature of 101 degrees or higher or for any symptoms that make you think you have and infection. 6. Weigh yourself each morning. Call if you gain more than 5 pounds in 48 hours. 7. Use the incentive spirometer 6-8 times a day-10 breaths each time. Use a pillow or your bear to splint your breastbone when coughing or sneezing. 8. If you feel your breast bone clicking or popping, notify the office immediately. 9. Walk several hundred feet several times daily. DIET  Eat an American Heart Association diet. If you are having trouble with your appetite, eat what you can. Try eating small, frequent meals throughout the day. ACTIVITY  1. NO DRIVING--you will be evaluated to drive at your follow up visit. 2. Increase your activity by walking several times a day. Stay out of bed most of the day. 3. When sitting, keep your legs elevated. 4. You may ride in a car, but you must get out every hour and walk around. If you ride in a car with an airbag that can not be switched off, put the seat ALL the way back or ride in the back seat. 5. Any load bearing activity can be performed as long as it can be performed \"in the tube\". You can reach \"out of the tube\" when performing activities that don't require heavy lifting. Let pain be your guide, your pain level will keep you from doing anything extreme. FOLLOW UP **Your first appointment will be by phone/telehealth unless otherwise specified   1. Your first follow up appointment will be on 5/6/21 at 11:30 am by phone. Your second follow up appointment will be in four weeks, on 5/24/21 at 1:00 pm. Please call our office at [phone] if you are unable to make either one of these appointments. Our office is located in [address] [place] [address] on the 4th floor, [address].   2. You will be receiving a call following discharge to set up outpatient cardiac rehab located at one of the following locations: [facility], [facility] [place] or [facility]. Please refer to your postop cardiac surgery handbook for contact information. 3. We will make an appointment with your cardiologist at your last appointment. 4. Consult you primary care physician regarding your influenza &   pneumovax vaccines.         5.   Please bring all medications with you to your appointment.     Signature:___________________________________________________

## 2021-04-30 NOTE — DISCHARGE SUMMARY
\[facility]\"" Discharge Summary     Patient ID:  Karna Nissen  [id number]  84 y.o.  1950    Admit date: 4/26/2021    Discharge date: 4/30/2021     Admitting Physician: Renee Joel MD     Referring Cardiologist:  Sherren Atkinson, MD    PCP:  Patricio Boast, [address]    Admitting Diagnoses:   1. CAD    Discharge Diagnoses:     Hospital Problems  Date Reviewed: 4/26/2021          Codes Class Noted POA    CAD (coronary artery disease) ICD-10-CM: I25.10  ICD-9-CM: 414.00  3/5/2021 Yes        S/P CABG x 5 ICD-10-CM: Z95.1  ICD-9-CM: V45.81  4/26/2021 Unknown    Overview Signed 4/26/2021  1:30 PM by Cheyenne Pereira PA-C     CABG X 5 LIMA to LAD, RSVG to PDA, Sequential RSVG to Diag, OM1, OM2  LEFT AND RIGHT ENDOSCOPIC SAPHENOUS VEIN HARVEST                   Discharged Condition: improved    Disposition: home, see patient instructions for treatment and plan    Procedures for this admission:  Procedure(s):  ON PUMP CORONARY ARTERY BYPASS GRAFT X 5  WITH LIMA, LEFT AND RIGHT SAPHENOUS VEIN HARVEST VIA EVH, NICK AND EPIAORTIC ULTRASOUND BY DR CULLEN [facility]    Discharge Medications:      My Medications      START taking these medications      Instructions Each Dose to Equal Morning Noon Evening Bedtime   acetaminophen 325 mg tablet  Commonly known as: TYLENOL    Your last dose was: Your next dose is: Take 2 Tabs by mouth every six (6) hours as needed for Pain. 650 mg                 amiodarone 200 mg tablet  Commonly known as: CORDARONE    Your last dose was: Your next dose is: Take 2 Tabs by mouth every twelve (12) hours. Take 2 tabs twice a day for 2 weeks, then decrease to 2 tabs daily. 400 mg                 apixaban 5 mg tablet  Commonly known as: ELIQUIS    Your last dose was: Your next dose is: Take 1 Tab by mouth two (2) times a day for 60 days. 5 mg                 ferrous sulfate 325 mg (65 mg iron) tablet  Start taking on: May 1, 2021    Your last dose was:      Your next dose is:         Take 1 Tab by mouth daily (with breakfast) for 30 days. 325 mg                 oxyCODONE IR 5 mg immediate release tablet  Commonly known as: ROXICODONE    Your last dose was: Your next dose is: Take 1 Tab by mouth every six (6) hours as needed for Pain for up to 5 days. Max Daily Amount: 20 mg.   5 mg                 polyethylene glycol 17 gram packet  Commonly known as: MIRALAX    Your last dose was: Your next dose is: Take 1 Packet by mouth daily as needed for Constipation for up to 14 days. 17 g                 senna-docusate 8.6-50 mg per tablet  Commonly known as: PERICOLACE    Your last dose was: Your next dose is: Take 1 Tab by mouth two (2) times daily as needed for Constipation for up to 14 days. 1 Tab                    CHANGE how you take these medications      Instructions Each Dose to Equal Morning Noon Evening Bedtime   metoprolol tartrate 25 mg tablet  Commonly known as: LOPRESSOR  What changed:   · how much to take  · additional instructions    Your last dose was: Your next dose is: Take 0.5 Tabs by mouth two (2) times a day for 60 days. Different dose than you were taking before surgery! 12.5 mg                    CONTINUE taking these medications      Instructions Each Dose to Equal Morning Noon Evening Bedtime   alfuzosin SR 10 mg SR tablet  Commonly known as: UROXATRAL    Your last dose was: Your next dose is: Take 1 Tab by mouth nightly. Indications: enlarged prostate with urination problem   10 mg                 aspirin delayed-release 81 mg tablet    Your last dose was: Your next dose is: Take 81 mg by mouth daily. 81 mg                 atorvastatin 40 mg tablet  Commonly known as: LIPITOR    Your last dose was: Your next dose is: Take 1 Tab by mouth daily. 40 mg                 nitroglycerin 0.4 mg SL tablet  Commonly known as: NITROSTAT    Your last dose was:      Your next dose is: 1 Tab by SubLINGual route every five (5) minutes as needed for Chest Pain. Up to 3 doses. Indications: acute attack of angina   0.4 mg                    STOP taking these medications    chlorhexidine 0.12 % solution  Commonly known as: Peridex        clopidogreL 75 mg Tab  Commonly known as: PLAVIX        isosorbide mononitrate ER 30 mg tablet  Commonly known as: IMDUR        mupirocin 2 % ointment  Commonly known as: BACTROBAN              Where to Get Your Medications      These medications were sent to [address] [place] [address], [address] [place] [address]    Phone: [phone]   · amiodarone 200 mg tablet  · apixaban 5 mg tablet  · ferrous sulfate 325 mg (65 mg iron) tablet  · metoprolol tartrate 25 mg tablet  · oxyCODONE IR 5 mg immediate release tablet  · polyethylene glycol 17 gram packet  · senna-docusate 8.6-50 mg per tablet     Information on where to get these meds will be given to you by the nurse or doctor. Ask your nurse or doctor about these medications  · acetaminophen 325 mg tablet       HPI:  Raymundo Llamas is a 69 y.o.  male who was referred for evaluation of CAD by Dr. Erasmo Laughlin. Pt was first seen when transferred to [facility] [place] from [facility] for CABG workup, CABG put on hold due to acute CVA post cath. Other PMH significant for newly diagnosed HTN, HLD, previous smoker (18 packyear-quit 2002), kidney stones, chronic lower back pain, Thalassemia trait and BPH.      Since last seen, pt reports no changes. Thinks his vision is improving somewhat. He also initially did not tolerate Imdur due to HA but then cut his pill in half and has been doing well on 15 mg daily. No recent CP or fluttering in chest.      **Following information obtained from prior visit.      He reports a 2-3 week history of chest tightness with activity.  He is active and noticed the pain when riding his bicycle, playing pickleball, and walking up the hill at [place]. He describes this as a tightness located substernal. Pain improves with rest and is variable in duration. His pain is associated with shortness of breath. He denies associated nausea, vomiting, syncope, diaphoresis, syncope, or radiation of pain.      He is retired from the [employer]. He lives in a home in [place] with his wife who is able to help him following surgery. He is a previous smoker and quit in 2002.     Hospital Course:   On 4/26/21 pt underwent CABG x 5 by Dr. Yokasta Gonzalez. Please see operative report for full details. He was transferred to the ICU in stable condition on amiodarone, insulin, precedex and neosynephrine gtts. He was extubated on 4/26/21 at 1815. Pt developed a-fib postop which prolonged his hospital course. He was otherwise stable and HR improved for discharge on POD#4. Current plans as follows:     1. S/p CABG: on ASA, statin. Cont metoprolol but decrease dose - BP low overnight     2. Atelectasis, small R effusion: increase IS use and activity, weaned to RA. Xray improved, hold diuretics today due to lower BP, diurese prn      3. Hypertension: Cont low dose BB. HR chronically low per pt - in high 40/50's preop     4. Hyperlipidemia: resumed statin     5. CVA with visual field deficits in March: Multiple small infarcts seen on MRI. On asa, statin. Carotids ok. D/c plavix, as will be on eliquis/asa for A fib.      6. BPH: improved since last visit. Had urolift in past. Had urology FU on 4/20, back on Uroxatral     7. Thalassemia trait: pt reports hx of and often times Hgb on lower side,      8. Acute postop anemia: Hgb 7.5, trend. Cont iron, also w/ Thalassemia trait per above     9. Thrombocytopenia: Platelets up to 060X, cont asa/Eliquis.     10. Postop A fib w/ RVR: On PO amio, gtt dc'd last night due to bradycardia. Decrease metoprolol to 12.5 mg PO BID. Cont eliquis 5 mg PO BID, & asa.      11. Hyponatremia: , cont diuresis prn. Weight improved     12.  Elevated Tbili: up to 1.2. Monitor.      13. Hyperglycemia: HgbA1c 5.3. Stress response likely, cont BS ACHS, SSI per orders. Cont lantus 15 units daily. Adjust supplements/consistent carb diet       Dispo: PT/OT. Transfer orders in to [facility], but likely d/c home this afternoon if HR remains stable. D/c AV wires     Referral to outpatient cardiac rehab made. Discharge Vital Signs:   Visit Vitals  BP (!) 113/90   Pulse 75   Temp 97.7 °F (36.5 °C)   Resp 23   Ht 5' 3.75\" (1.619 m)   Wt 167 lb 6.4 oz (75.9 kg)   SpO2 93%   BMI 28.96 kg/m²       Labs:   Recent Labs     04/30/21  1220 04/30/21  0239 04/30/21  0239   WBC  --   --  5.2   HGB  --   --  7.5*   HCT  --   --  23.5*   PLT  --   --  125*   NA  --   --  133*   K  --   --  4.5   BUN  --   --  20   CREA  --   --  1.02   GLU  --   --  129*   GLUCPOC 157*   < >  --     < > = values in this interval not displayed. Diagnostics:   PA/lateral:   CXR Results  (Last 48 hours)               04/30/21 0844  XR CHEST PA LAT Final result    Impression:      Decreased diffuse interstitial opacities. Stable mild bibasilar opacities. Small   right greater than left pleural effusions. Narrative:  EXAM:  XR CHEST PA LAT       INDICATION: Mild diffuse interstitial opacities and bibasilar opacities. COMPARISON: 4/29/2021 at 0434 hours       TECHNIQUE: PA and lateral chest views       FINDINGS: The cardiomediastinal contours are stable. Mild diffuse interstitial opacities are decreased. There are stable mild   bibasilar opacities. There are small right greater than left pleural effusions. There is no pneumothorax. The bones and upper abdomen are stable. 04/29/21 0501  XR CHEST PORT Final result    Impression:      Stable mild diffuse interstitial opacities and bibasilar atelectasis. Narrative:  EXAM:  XR CHEST PORT       INDICATION: Bibasilar opacities.        COMPARISON: 4/28/2021 at 0450 hours       TECHNIQUE: Portable AP semiupright chest view at 0434 hours       FINDINGS: The mediastinal drains and left chest tube have been removed. The   cardiomediastinal contours are stable. There is stable mild diffuse interstitial opacities and bibasilar atelectasis. There is no pleural effusion or pneumothorax. The bones and upper abdomen are   stable. Patient Instructions/Follow Up Care:  Discharge instructions were reviewed with the patient and family present. Questions were also answered at this time. Prescriptions and medications were reviewed. The patient has a follow up appointment with the Nurse Practitioner or Physician's Assistant on 5/6/21 at 11:30 am and with Dr. Rolando Bbo on 5/24/21 at 1:00 pm. The patient was also instructed to follow up with his primary care physician as needed. The patient and family were encouraged to call with any questions or concerns.        Signed:  Bc Luna NP  4/30/2021  10:07 AM

## 2021-04-30 NOTE — PROGRESS NOTES
Transitions of Care: 15% risk of re-admission         -Patient will discharge home with spouse and [facility] RN, PT/OT   -Family to transport   -Cardiac Surgery follow-up    The CM spoke with CSS NP, anticipate d/c home this afternoon. The CM called Marylu Saucedo with [facility]- made her aware of discharge today, [facility] tomorrow 5/1. PT/OT HH orders faxed directly to agency, CM will fax discharge summary once available. The patient has free 30-day Eliquis coupon. The CM met with patient at bedside, spouse supportive- to discharge home today, wife will transport. Medicare pt has received 2nd IM letter informing them of their right to appeal the discharge. Copy has been placed on pt bedside chart.     MAX Stewart

## 2021-05-06 ENCOUNTER — OFFICE VISIT (OUTPATIENT)
Dept: CARDIOLOGY CLINIC | Age: 71
End: 2021-05-06
Payer: MEDICARE

## 2021-05-06 DIAGNOSIS — Z95.1 S/P CABG X 5: Primary | ICD-10-CM

## 2021-05-06 PROCEDURE — 99024 POSTOP FOLLOW-UP VISIT: CPT | Performed by: NURSE PRACTITIONER

## 2021-05-06 NOTE — PROGRESS NOTES
Patient: Libby Johns   Age: 69 y.o. Patient Care Team:  DMITRY Moreno as PCP - General (Physician Assistant)  DMITRY Moreno as PCP - [facility]  Gregoria Velasco MD (Cardiology)  Noelle Sanchez MD (Cardiothoracic Surgery)    Diagnosis: The encounter diagnosis was S/P CABG x 5. Problem List:   Patient Active Problem List   Diagnosis Code    CAD (coronary artery disease) I25.10    Unstable angina (HCC) I20.0    Acute CVA (cerebrovascular accident) ([facility]) I63.9    S/P CABG x 5 Z95.1        This service was provided thru telehealth, between Tamika Long NP at Cardiac Surgery Specialist's office and Libby Andreas at their home. Date of Surgery: 4/26/21    Surgery: CABG x5    HPI:  Pt was called for his 5 day post op call. He reports he has been doing very well at home. He is sleeping better. He is eating well and having normal BMs. Reports some numbness on the left side of his tongue and lack of taste on that side, reporting that it began in the hospital. Peder Ritchie site is draining slightly, but states he hasn't at all today. Midsternal is healing well, denies swelling, drainage, or erythema. BPs are running 120s. Reports HH says is he doing great. His weight is down 7lbs. Pt has not felt himself in a-fib since being home. His daughter also got him the new apple watch which is able to assess rhythm if he feels like is in a-fib. Current Medications:   Current Outpatient Medications   Medication Sig Dispense Refill    metoprolol tartrate (LOPRESSOR) 25 mg tablet Take 0.5 Tabs by mouth two (2) times a day for 60 days. Different dose than you were taking before surgery! 30 Tab 1    acetaminophen (TYLENOL) 325 mg tablet Take 2 Tabs by mouth every six (6) hours as needed for Pain. 30 Tab 0    amiodarone (CORDARONE) 200 mg tablet Take 2 Tabs by mouth every twelve (12) hours. Take 2 tabs twice a day for 2 weeks, then decrease to 2 tabs daily.  84 Tab 0    apixaban (ELIQUIS) 5 mg tablet Take 1 Tab by mouth two (2) times a day for 60 days. 60 Tab 1    ferrous sulfate 325 mg (65 mg iron) tablet Take 1 Tab by mouth daily (with breakfast) for 30 days. 30 Tab 0    polyethylene glycol (MIRALAX) 17 gram packet Take 1 Packet by mouth daily as needed for Constipation for up to 14 days. 14 Packet 0    senna-docusate (PERICOLACE) 8.6-50 mg per tablet Take 1 Tab by mouth two (2) times daily as needed for Constipation for up to 14 days. 28 Tab 0    alfuzosin SR (UROXATRAL) 10 mg SR tablet Take 1 Tab by mouth nightly. Indications: enlarged prostate with urination problem 30 Tab 1    atorvastatin (LIPITOR) 40 mg tablet Take 1 Tab by mouth daily. 30 Tab 1    aspirin delayed-release 81 mg tablet Take 81 mg by mouth daily. Vitals: There were no vitals taken for this visit. Allergies: has No Known Allergies. Physical Exam:  telehealth visit    Assessment/Plan:   1. S/p CABG: on ASA, statin. Cont metoprolol      2. Hypertension: Cont low dose BB. HR chronically low per pt - in high 40/50's preop     3. Hyperlipidemia: resumed statin     4. CVA with visual field deficits in March: Multiple small infarcts seen on MRI. On asa, statin. Carotids ok. D/c plavix, as will be on eliquis/asa for A fib.      5. BPH: improved since last visit. Had urolift in past. Had urology FU on 4/20, back on Uroxatral - pt would like to trial stopping this.      6. Thalassemia trait: pt reports hx of and often times Hgb on lower side,      7. Postop A fib w/ RVR: On PO amio, gtt dc'd last night due to bradycardia. Decrease metoprolol to 12.5 mg PO BID. Cont eliquis 5 mg PO BID, & asa. Telephone Time: 15minutes    Pt is ready to start cardiac rehab.      Rehab - after [facility]  Walking: yes

## 2021-05-11 ENCOUNTER — TRANSCRIBE ORDER (OUTPATIENT)
Dept: CARDIAC REHAB | Age: 71
End: 2021-05-11

## 2021-05-11 DIAGNOSIS — Z95.1 POSTSURGICAL AORTOCORONARY BYPASS STATUS: Primary | ICD-10-CM

## 2021-05-20 ENCOUNTER — HOSPITAL ENCOUNTER (OUTPATIENT)
Dept: CARDIAC REHAB | Age: 71
Discharge: HOME | End: 2021-05-20
Payer: MEDICARE

## 2021-05-20 VITALS — WEIGHT: 155.6 LBS | BODY MASS INDEX: 26.56 KG/M2 | HEIGHT: 64 IN

## 2021-05-20 PROCEDURE — 93798 PHYS/QHP OP CAR RHAB W/ECG: CPT

## 2021-05-20 NOTE — CARDIO/PULMONARY
Gage Collier 69 y.o.  
 
presented to cardiac rehab for orientation and exercise tolerance test today with a primary diagnosis of CABG X5 on 4/26/21. EF is 55%-60% per echo done 3/8/21. Gage Collier had no significant cardiac hx other then HTN, HLD prior to surgery. Cardiac risk factors include: age, gender, remote smoking (quit 2002), HTN, HLD, +family hx. Gage Collier is   and lives with wife, Yung No, in [place], [place]. He has 3 adult dgts. He is retired  ([employer]) and also retired from [employer] spot. PHQ9, depression score, is 0 and this is considered normal. The result was discussed with patient who affirms score to be accurate Patient denied chest pain or SOB during 6 minute exercise tolerance test and was in SB/SR with isolated PAC. Exercise prescription developed around patient stated goals, to be supplemented with home exercise recommendations. Education manual given to patient and reviewed. Patient will attend cardiac rehab 2-3 times/week and education

## 2021-05-24 ENCOUNTER — OFFICE VISIT (OUTPATIENT)
Dept: CARDIOLOGY CLINIC | Age: 71
End: 2021-05-24
Payer: MEDICARE

## 2021-05-24 ENCOUNTER — HOSPITAL ENCOUNTER (OUTPATIENT)
Dept: CARDIAC REHAB | Age: 71
Discharge: HOME | End: 2021-05-24
Payer: MEDICARE

## 2021-05-24 VITALS
OXYGEN SATURATION: 98 % | RESPIRATION RATE: 12 BRPM | SYSTOLIC BLOOD PRESSURE: 110 MMHG | BODY MASS INDEX: 25.61 KG/M2 | DIASTOLIC BLOOD PRESSURE: 70 MMHG | TEMPERATURE: 98.6 F | HEART RATE: 54 BPM | WEIGHT: 150 LBS | HEIGHT: 64 IN

## 2021-05-24 VITALS — WEIGHT: 150 LBS | BODY MASS INDEX: 25.75 KG/M2

## 2021-05-24 DIAGNOSIS — Z95.1 S/P CABG X 5: Primary | ICD-10-CM

## 2021-05-24 PROCEDURE — 93798 PHYS/QHP OP CAR RHAB W/ECG: CPT

## 2021-05-24 PROCEDURE — 99024 POSTOP FOLLOW-UP VISIT: CPT | Performed by: PHYSICIAN ASSISTANT

## 2021-05-24 RX ORDER — ALFUZOSIN HYDROCHLORIDE 10 MG/1
10 TABLET, EXTENDED RELEASE ORAL
Qty: 30 TABLET | Refills: 1 | Status: SHIPPED | OUTPATIENT
Start: 2021-05-24

## 2021-05-24 NOTE — PROGRESS NOTES
Patient: Cb Yang   Age: 69 y.o. Patient Care Team:  DMITRY Miranda as PCP - General (Physician Assistant)  DMITRY Miranda as PCP - [address] [place] [address]  [facility] Provider  Jenny Ferro MD (Cardiology)  Bhavya Baker MD (Cardiothoracic Surgery)    Diagnosis: The encounter diagnosis was S/P CABG x 5. Problem List:   Patient Active Problem List   Diagnosis Code    CAD (coronary artery disease) I25.10    Unstable angina (HCC) I20.0    Acute CVA (cerebrovascular accident) ([facility] [address] I63.9    S/P CABG x 5 Z95.1      Date of Surgery: 4/26/21    Surgery: CABG x5    HPI:  Pt presented for routine post-operative care. He reports he has still been doing very well at home. He is eating well and having normal BMs. He is walking daily and eager to walk more. He did have a ground level fall 4 days ago, but denied any popping/clicking from his sternum. Current Medications:   Current Outpatient Medications   Medication Sig Dispense Refill    psyllium (METAMUCIL) packet Take 1 Packet by mouth daily.  metoprolol tartrate (LOPRESSOR) 25 mg tablet Take 0.5 Tabs by mouth two (2) times a day for 60 days. Different dose than you were taking before surgery! 30 Tab 1    acetaminophen (TYLENOL) 325 mg tablet Take 2 Tabs by mouth every six (6) hours as needed for Pain. 30 Tab 0    amiodarone (CORDARONE) 200 mg tablet Take 2 Tabs by mouth every twelve (12) hours. Take 2 tabs twice a day for 2 weeks, then decrease to 2 tabs daily. 84 Tab 0    apixaban (ELIQUIS) 5 mg tablet Take 1 Tab by mouth two (2) times a day for 60 days. 60 Tab 1    ferrous sulfate 325 mg (65 mg iron) tablet Take 1 Tab by mouth daily (with breakfast) for 30 days. 30 Tab 0    alfuzosin SR (UROXATRAL) 10 mg SR tablet Take 1 Tab by mouth nightly. Indications: enlarged prostate with urination problem 30 Tab 1    atorvastatin (LIPITOR) 40 mg tablet Take 1 Tab by mouth daily.  30 Tab 1    aspirin delayed-release 81 mg tablet Take 81 mg by mouth daily. Vitals: Blood pressure 110/70, pulse (!) 54, temperature 98.6 °F (37 °C), resp. rate 12, height 5' 4\" (1.626 m), weight 150 lb (68 kg), SpO2 98 %. Allergies: has No Known Allergies. Physical Exam:  Physical Exam  Constitutional:       Appearance: Normal appearance. HENT:      Head: Normocephalic and atraumatic. Cardiovascular:      Rate and Rhythm: Normal rate and regular rhythm. Pulmonary:      Effort: Pulmonary effort is normal.   Skin:     General: Skin is warm and dry. Comments: Incisions clean/dry/intact   Neurological:      Mental Status: He is alert. Assessment/Plan:   1. S/p CABG: on ASA, statin. Cont metoprolol      2. Hypertension: Cont low dose BB.     3. Hyperlipidemia: resumed statin     4. Postop A fib w/ RVR: In NSR. On PO amio, metoprolol, eliquis     Pt is ready to start cardiac rehab. Follow up PRN.     Rehab - after [facility]  Walking: yes   Glucometer: N/A  Antibiotic card for valves: N/A

## 2021-05-26 ENCOUNTER — HOSPITAL ENCOUNTER (OUTPATIENT)
Dept: CARDIAC REHAB | Age: 71
Discharge: HOME | End: 2021-05-26
Payer: MEDICARE

## 2021-05-26 VITALS — BODY MASS INDEX: 25.92 KG/M2 | WEIGHT: 151 LBS

## 2021-05-26 PROCEDURE — 93798 PHYS/QHP OP CAR RHAB W/ECG: CPT

## 2021-05-28 ENCOUNTER — HOSPITAL ENCOUNTER (OUTPATIENT)
Dept: CARDIAC REHAB | Age: 71
Discharge: HOME | End: 2021-05-28
Payer: MEDICARE

## 2021-05-28 VITALS — BODY MASS INDEX: 25.75 KG/M2 | WEIGHT: 150 LBS

## 2021-05-28 PROCEDURE — 93798 PHYS/QHP OP CAR RHAB W/ECG: CPT

## 2021-06-01 ENCOUNTER — HOSPITAL ENCOUNTER (OUTPATIENT)
Dept: CARDIAC REHAB | Age: 71
Discharge: HOME | End: 2021-06-01
Payer: MEDICARE

## 2021-06-01 VITALS — BODY MASS INDEX: 26.09 KG/M2 | WEIGHT: 152 LBS

## 2021-06-01 PROCEDURE — 93798 PHYS/QHP OP CAR RHAB W/ECG: CPT

## 2021-06-02 ENCOUNTER — HOSPITAL ENCOUNTER (OUTPATIENT)
Dept: CARDIAC REHAB | Age: 71
Discharge: HOME | End: 2021-06-02
Payer: MEDICARE

## 2021-06-02 VITALS — BODY MASS INDEX: 25.92 KG/M2 | WEIGHT: 151 LBS

## 2021-06-02 PROCEDURE — 93798 PHYS/QHP OP CAR RHAB W/ECG: CPT

## 2021-06-02 PROCEDURE — 93797 PHYS/QHP OP CAR RHAB WO ECG: CPT

## 2021-06-04 ENCOUNTER — HOSPITAL ENCOUNTER (OUTPATIENT)
Dept: CARDIAC REHAB | Age: 71
Discharge: HOME | End: 2021-06-04
Payer: MEDICARE

## 2021-06-04 VITALS — WEIGHT: 150 LBS | BODY MASS INDEX: 25.75 KG/M2

## 2021-06-04 PROCEDURE — 93798 PHYS/QHP OP CAR RHAB W/ECG: CPT

## 2021-06-07 ENCOUNTER — HOSPITAL ENCOUNTER (OUTPATIENT)
Dept: CARDIAC REHAB | Age: 71
Discharge: HOME | End: 2021-06-07
Payer: MEDICARE

## 2021-06-07 VITALS — WEIGHT: 151 LBS | BODY MASS INDEX: 25.92 KG/M2

## 2021-06-07 PROCEDURE — 93798 PHYS/QHP OP CAR RHAB W/ECG: CPT

## 2021-06-09 ENCOUNTER — HOSPITAL ENCOUNTER (OUTPATIENT)
Dept: CARDIAC REHAB | Age: 71
Discharge: HOME | End: 2021-06-09
Payer: MEDICARE

## 2021-06-09 VITALS — WEIGHT: 151 LBS | BODY MASS INDEX: 25.92 KG/M2

## 2021-06-09 PROCEDURE — 93798 PHYS/QHP OP CAR RHAB W/ECG: CPT

## 2021-06-09 PROCEDURE — 93797 PHYS/QHP OP CAR RHAB WO ECG: CPT

## 2021-06-11 ENCOUNTER — HOSPITAL ENCOUNTER (OUTPATIENT)
Dept: CARDIAC REHAB | Age: 71
Discharge: HOME | End: 2021-06-11
Payer: MEDICARE

## 2021-06-11 VITALS — WEIGHT: 148 LBS | BODY MASS INDEX: 25.4 KG/M2

## 2021-06-11 PROCEDURE — 93798 PHYS/QHP OP CAR RHAB W/ECG: CPT

## 2021-06-14 ENCOUNTER — HOSPITAL ENCOUNTER (OUTPATIENT)
Dept: CARDIAC REHAB | Age: 71
Discharge: HOME | End: 2021-06-14
Payer: MEDICARE

## 2021-06-14 VITALS — BODY MASS INDEX: 25.4 KG/M2 | WEIGHT: 148 LBS

## 2021-06-14 PROCEDURE — 93798 PHYS/QHP OP CAR RHAB W/ECG: CPT

## 2021-06-15 ENCOUNTER — HOSPITAL ENCOUNTER (OUTPATIENT)
Dept: CARDIAC REHAB | Age: 71
Discharge: HOME | End: 2021-06-15
Payer: MEDICARE

## 2021-06-15 VITALS — BODY MASS INDEX: 25.4 KG/M2 | WEIGHT: 148 LBS

## 2021-06-15 PROCEDURE — 93798 PHYS/QHP OP CAR RHAB W/ECG: CPT

## 2021-06-18 ENCOUNTER — HOSPITAL ENCOUNTER (OUTPATIENT)
Dept: CARDIAC REHAB | Age: 71
Discharge: HOME | End: 2021-06-18
Payer: MEDICARE

## 2021-06-18 VITALS — BODY MASS INDEX: 25.23 KG/M2 | WEIGHT: 147 LBS

## 2021-06-18 PROCEDURE — 93798 PHYS/QHP OP CAR RHAB W/ECG: CPT

## 2021-06-21 ENCOUNTER — HOSPITAL ENCOUNTER (OUTPATIENT)
Dept: CARDIAC REHAB | Age: 71
Discharge: HOME | End: 2021-06-21
Payer: MEDICARE

## 2021-06-21 VITALS — WEIGHT: 148 LBS | BODY MASS INDEX: 25.4 KG/M2

## 2021-06-21 PROCEDURE — 93798 PHYS/QHP OP CAR RHAB W/ECG: CPT

## 2021-06-23 ENCOUNTER — HOSPITAL ENCOUNTER (OUTPATIENT)
Dept: CARDIAC REHAB | Age: 71
Discharge: HOME | End: 2021-06-23
Payer: MEDICARE

## 2021-06-23 VITALS — WEIGHT: 149 LBS | BODY MASS INDEX: 25.58 KG/M2

## 2021-06-23 PROCEDURE — 93797 PHYS/QHP OP CAR RHAB WO ECG: CPT

## 2021-06-23 PROCEDURE — 93798 PHYS/QHP OP CAR RHAB W/ECG: CPT

## 2021-06-25 ENCOUNTER — HOSPITAL ENCOUNTER (OUTPATIENT)
Dept: CARDIAC REHAB | Age: 71
Discharge: HOME | End: 2021-06-25
Payer: MEDICARE

## 2021-06-25 VITALS — BODY MASS INDEX: 25.58 KG/M2 | WEIGHT: 149 LBS

## 2021-06-25 PROCEDURE — 93798 PHYS/QHP OP CAR RHAB W/ECG: CPT

## 2021-06-28 ENCOUNTER — HOSPITAL ENCOUNTER (OUTPATIENT)
Dept: CARDIAC REHAB | Age: 71
Discharge: HOME | End: 2021-06-28
Payer: MEDICARE

## 2021-06-28 VITALS — WEIGHT: 149 LBS | BODY MASS INDEX: 25.58 KG/M2

## 2021-06-28 PROCEDURE — 93798 PHYS/QHP OP CAR RHAB W/ECG: CPT

## 2021-06-30 ENCOUNTER — HOSPITAL ENCOUNTER (OUTPATIENT)
Dept: CARDIAC REHAB | Age: 71
Discharge: HOME | End: 2021-06-30
Payer: MEDICARE

## 2021-06-30 VITALS — WEIGHT: 147 LBS | BODY MASS INDEX: 25.23 KG/M2

## 2021-06-30 PROCEDURE — 93797 PHYS/QHP OP CAR RHAB WO ECG: CPT

## 2021-06-30 PROCEDURE — 93798 PHYS/QHP OP CAR RHAB W/ECG: CPT

## 2021-07-02 ENCOUNTER — HOSPITAL ENCOUNTER (OUTPATIENT)
Dept: CARDIAC REHAB | Age: 71
Discharge: HOME | End: 2021-07-02
Payer: MEDICARE

## 2021-07-02 VITALS — WEIGHT: 148 LBS | BODY MASS INDEX: 25.4 KG/M2

## 2021-07-02 PROCEDURE — 93798 PHYS/QHP OP CAR RHAB W/ECG: CPT

## 2021-07-06 ENCOUNTER — HOSPITAL ENCOUNTER (OUTPATIENT)
Dept: CARDIAC REHAB | Age: 71
Discharge: HOME | End: 2021-07-06
Payer: MEDICARE

## 2021-07-06 VITALS — WEIGHT: 148 LBS | BODY MASS INDEX: 25.4 KG/M2

## 2021-07-06 PROCEDURE — 93798 PHYS/QHP OP CAR RHAB W/ECG: CPT

## 2021-07-07 ENCOUNTER — HOSPITAL ENCOUNTER (OUTPATIENT)
Dept: CARDIAC REHAB | Age: 71
Discharge: HOME | End: 2021-07-07
Payer: MEDICARE

## 2021-07-07 ENCOUNTER — APPOINTMENT (OUTPATIENT)
Dept: CARDIAC REHAB | Age: 71
End: 2021-07-07
Payer: MEDICARE

## 2021-07-07 VITALS — BODY MASS INDEX: 25.4 KG/M2 | WEIGHT: 148 LBS

## 2021-07-07 PROCEDURE — 93798 PHYS/QHP OP CAR RHAB W/ECG: CPT

## 2021-07-09 ENCOUNTER — HOSPITAL ENCOUNTER (OUTPATIENT)
Dept: CARDIAC REHAB | Age: 71
Discharge: HOME | End: 2021-07-09
Payer: MEDICARE

## 2021-07-09 VITALS — BODY MASS INDEX: 25.23 KG/M2 | WEIGHT: 147 LBS

## 2021-07-09 PROCEDURE — 93798 PHYS/QHP OP CAR RHAB W/ECG: CPT

## 2021-07-14 ENCOUNTER — HOSPITAL ENCOUNTER (OUTPATIENT)
Dept: CARDIAC REHAB | Age: 71
Discharge: HOME | End: 2021-07-14
Payer: MEDICARE

## 2021-07-14 VITALS — WEIGHT: 148 LBS | BODY MASS INDEX: 25.4 KG/M2

## 2021-07-14 PROCEDURE — 93798 PHYS/QHP OP CAR RHAB W/ECG: CPT

## 2021-07-15 ENCOUNTER — HOSPITAL ENCOUNTER (OUTPATIENT)
Dept: CARDIAC REHAB | Age: 71
Discharge: HOME | End: 2021-07-15
Payer: MEDICARE

## 2021-07-15 VITALS — BODY MASS INDEX: 25.23 KG/M2 | WEIGHT: 147 LBS

## 2021-07-15 PROCEDURE — 93798 PHYS/QHP OP CAR RHAB W/ECG: CPT

## 2021-07-19 ENCOUNTER — HOSPITAL ENCOUNTER (OUTPATIENT)
Dept: CARDIAC REHAB | Age: 71
Discharge: HOME | End: 2021-07-19
Payer: MEDICARE

## 2021-07-19 VITALS — WEIGHT: 148 LBS | BODY MASS INDEX: 25.4 KG/M2

## 2021-07-19 PROCEDURE — 93798 PHYS/QHP OP CAR RHAB W/ECG: CPT

## 2021-07-21 ENCOUNTER — HOSPITAL ENCOUNTER (OUTPATIENT)
Dept: CARDIAC REHAB | Age: 71
Discharge: HOME | End: 2021-07-21
Payer: MEDICARE

## 2021-07-21 VITALS — BODY MASS INDEX: 24.89 KG/M2 | WEIGHT: 145 LBS

## 2021-07-21 PROCEDURE — 93797 PHYS/QHP OP CAR RHAB WO ECG: CPT

## 2021-07-21 PROCEDURE — 93798 PHYS/QHP OP CAR RHAB W/ECG: CPT

## 2021-07-23 ENCOUNTER — HOSPITAL ENCOUNTER (OUTPATIENT)
Dept: CARDIAC REHAB | Age: 71
Discharge: HOME | End: 2021-07-23
Payer: MEDICARE

## 2021-07-23 VITALS — BODY MASS INDEX: 24.89 KG/M2 | WEIGHT: 145 LBS

## 2021-07-23 PROCEDURE — 93798 PHYS/QHP OP CAR RHAB W/ECG: CPT

## 2021-07-27 ENCOUNTER — HOSPITAL ENCOUNTER (OUTPATIENT)
Dept: CARDIAC REHAB | Age: 71
Discharge: HOME | End: 2021-07-27
Payer: MEDICARE

## 2021-07-27 VITALS — BODY MASS INDEX: 25.06 KG/M2 | WEIGHT: 146 LBS

## 2021-07-27 PROCEDURE — 93798 PHYS/QHP OP CAR RHAB W/ECG: CPT

## 2021-07-29 ENCOUNTER — HOSPITAL ENCOUNTER (OUTPATIENT)
Dept: CARDIAC REHAB | Age: 71
Discharge: HOME | End: 2021-07-29
Payer: MEDICARE

## 2021-07-29 VITALS — BODY MASS INDEX: 24.89 KG/M2 | WEIGHT: 145 LBS

## 2021-07-29 PROCEDURE — 93798 PHYS/QHP OP CAR RHAB W/ECG: CPT

## 2021-08-05 ENCOUNTER — HOSPITAL ENCOUNTER (OUTPATIENT)
Dept: CARDIAC REHAB | Age: 71
Discharge: HOME | End: 2021-08-05
Payer: MEDICARE

## 2021-08-05 VITALS — WEIGHT: 145 LBS | BODY MASS INDEX: 24.89 KG/M2

## 2021-08-05 PROCEDURE — 93798 PHYS/QHP OP CAR RHAB W/ECG: CPT

## 2021-08-05 PROCEDURE — 93797 PHYS/QHP OP CAR RHAB WO ECG: CPT | Performed by: DIETITIAN, REGISTERED

## 2021-08-05 NOTE — CARDIO/PULMONARY
Cardiac Rehab Nutrition Assessment - 1:1 Evaluation           NAME: Mark Mcfarland : 1950 AGE: 69 y.o. GENDER: male  CARDIAC REHAB ADMITTING DIAGNOSIS: CABGx5 (21)    PROBLEM LIST:  Patient Active Problem List   Diagnosis Code    CAD (coronary artery disease) I25.10    Unstable angina (HCC) I20.0    Acute CVA (cerebrovascular accident) ([facility] [address] [address] I63.9    S/P CABG x 5 Z95.1     HTN, HLD      LABS:   Lab Results   Component Value Date/Time    Hemoglobin A1c 5.3 2021 12:32 PM     Lab Results   Component Value Date/Time    Cholesterol, total 208 (H) 2021 12:32 PM    HDL Cholesterol 40 2021 12:32 PM    LDL, calculated 131 (H) 2021 12:32 PM    VLDL, calculated 37 2021 12:32 PM    Triglyceride 185 (H) 2021 12:32 PM    CHOL/HDL Ratio 5.2 (H) 2021 12:32 PM         MEDICATIONS/SUPPLEMENTS:   [unfilled]  Prior to Admission medications    Medication Sig Start Date End Date Taking? Authorizing Provider   alfuzosin SR (UROXATRAL) 10 mg SR tablet Take 1 Tablet by mouth nightly. Indications: enlarged prostate with urination problem 21   DMITRY Delong   psyllium (METAMUCIL) packet Take 1 Packet by mouth daily. Patient not taking: Reported on 2021    Provider, Historical   acetaminophen (TYLENOL) 325 mg tablet Take 2 Tabs by mouth every six (6) hours as needed for Pain. Patient not taking: Reported on 2021   Reid Jean NP   atorvastatin (LIPITOR) 40 mg tablet Take 1 Tab by mouth daily. 3/8/21   Liliam Fine NP   aspirin delayed-release 81 mg tablet Take 81 mg by mouth daily.     Provider, Historical           ANTHROPOMETRICS:    Ht Readings from Last 1 Encounters:   21 5' 4\" (1.626 m)      Wt Readings from Last 10 Encounters:   21 65.8 kg (145 lb)   21 66.2 kg (146 lb)   21 65.8 kg (145 lb)   21 65.8 kg (145 lb)   21 67.1 kg (148 lb)   07/15/21 66.7 kg (147 lb)   21 67.1 kg (148 lb)   21 66.7 kg (147 lb)   07/07/21 67.1 kg (148 lb)   07/06/21 67.1 kg (148 lb)      IBW:130 # +/- 10%  %IBW: 112 % +/- 10%    BMI: 24.9 kg/M2 Category: Normal  Waist: 36 inches at intake as measured by RN; 37.5 inches at discharge    Reported Wt Hx: 162 lbs at time of surgery, weight loss has been intentional as well as fluid shifts    Reported Diet Hx:    Rate Your Plate Score: 62  (Score 58-72: Making many healthy choices; 41-57: Some choices need improving 24-40: many choices need improving)    24 HOUR DIET RECALL  Breakfast Smoothie (berries, whey protein, green powder, spinach, banana, almond milk)   Lunch Chicken breast (in instant pot) with sundried tomato, pasta, low sodium chicken broth, olive oil; canteloupe   Dinner Lettuce & tomato salad with nuts, blueberries   Snacks Mini blueberry muffin, 3-4 brazil nuts, handful of almonds   Beverages Green tea (unsweetened decaf), water     Ronda Blancas states is more aware of sodium, but otherwise how he eats is the same as before surgery. He and his wife emphasize whole foods, homemade goods. Lunch is the main meal of the day. Has been off alcohol while on Eliquis. Environmental/Social: Deangelo Helms lives with his supportive wife, she does the cooking and is present for today's consult; Deangelo Helms is a regular exerciser - walked 3 miles today and 4 games of pickle ball and will exercise at rehab today. NUTRITION INTERVENTION:  Nutrition 60 minute one-on-one education & goal setting with Ronda Blancas    Reviewed with Ronda Blancas relevant labs compared to ideals. Reviewed weight history and patient's verbalized weight goal as well as any real or perceived barriers to obtaining the goal. Collaborated with patient to set a specific short and long term weight goal.     Reviewed Rate Your Plate and conducted a verbal diet recall.   Assessed for environmental, financial, psychosocial, physical and comorbidities that may impact the food and eating patterns / behaviors of Jae Bartholomew    Collaborated with patient to set specific nutrient goals as well as specific food / behavior changes that will help patient meet the overall goal of following a heart healthy eating pattern (using guidelines as set forth by the American Heart Association and modeled after healthful eating patterns as recognized by the USDA Dietary Guidelines such as DASH, Mediterranean or plant-based). Briefly reviewed with Jae Bartholomew the nutrition information in the Cardiac Rehab patient education book and encouraged Jae Bartholomew to read thoroughly, ask questions as needed, and use for future reference for heart healthy nutrition information. Jae Bartholomew has participated in Cardiac Rehab group nutrition classes. PATIENT GOALS:    Weight Goals:  Short Term Weight Goal:145 lbs (met)  Long Term Weight Goal:140-145 lbs    Nutrition Goals:  Daily Recommendations:  Calories: 1900 /day  (using Wen Ruiz with AF 1.4)    Saturated Fat: no more than 12 g/day  Trans Fat: 0 g/day  Sodium: no more than 7972-8218 mg/day  Fruit: 2.5 cups / day  Vegetables: 2.5 or more cups/day    Other:  - read and compare food labels  - continue the heart healthy diet regiment  - limit added sugars (9 tsp or 36 g/day per AHA)  - continue to limit alcohol                  Questions addressed. Follow-up plans discussed. Jae Bartholomew verbalized understanding.             Edwige Lugo RD

## 2021-08-09 NOTE — CARDIO/PULMONARY
Claudia Vaca  Completed phase II cardiac rehab and attended  36 sessions from 5/20/21 to 8/5/21 . Claudia Vaca is interested in maintaining optimal health and will work with Criss Martinez. Claudia Vaca has improved his endurance and stamina through regular exercise, lost 10 lbs. Blood pressure is 115/67 and is WNL. He has also improved his nutrition, [employer] and depression scores and these were reviewed with patient. Claudia Vaca plans to continue exercising at home, by walking daily. He  has set revised goals that include using cardio equipment, light weights and walking, 3 to 5 times a week for at least 30 minutes.   Leola Marino RN  8/9/2021

## 2022-03-18 PROBLEM — I25.10 CAD (CORONARY ARTERY DISEASE): Status: ACTIVE | Noted: 2021-03-05

## 2022-03-19 PROBLEM — I20.0 UNSTABLE ANGINA (HCC): Status: ACTIVE | Noted: 2021-03-05

## 2022-03-19 PROBLEM — I63.9 ACUTE CVA (CEREBROVASCULAR ACCIDENT) (HCC): Status: ACTIVE | Noted: 2021-03-06

## 2022-03-20 PROBLEM — Z95.1 S/P CABG X 5: Status: ACTIVE | Noted: 2021-04-26

## 2023-05-15 RX ORDER — ATORVASTATIN CALCIUM 40 MG/1
40 TABLET, FILM COATED ORAL DAILY
COMMUNITY
Start: 2021-03-08

## 2023-05-15 RX ORDER — ALFUZOSIN HYDROCHLORIDE 10 MG/1
10 TABLET, EXTENDED RELEASE ORAL
COMMUNITY
Start: 2021-05-24

## 2023-05-15 RX ORDER — ACETAMINOPHEN 325 MG/1
650 TABLET ORAL EVERY 6 HOURS PRN
COMMUNITY
Start: 2021-04-30

## 2023-05-15 RX ORDER — ASPIRIN 81 MG/1
81 TABLET ORAL DAILY
COMMUNITY

## 2024-01-01 NOTE — PROGRESS NOTES
1200- bedside report received, patient in chair without complaints. [address], NP at bedside to remove pacer wires. [facility] stated patient can discharge in 2 hours after pacer wire removal.    1515- I have reviewed discharge instructions with the patient and spouse. The patient and spouse verbalized understanding. Discharge medications reviewed with patient and spouse and appropriate educational materials and side effects teaching were provided. All questions and concerns were addressed. IV's removed without difficulty. All personal belongings with patient on discharge. [New Patient/Consultation] : a new patient/consultation for [Sickle Cell Disease] : sickle cell disease [Mother] : mother [Medical Records] : medical records

## 2024-10-30 ENCOUNTER — HOSPITAL ENCOUNTER (OUTPATIENT)
Facility: HOSPITAL | Age: 74
Discharge: HOME | End: 2024-11-02
Attending: STUDENT IN AN ORGANIZED HEALTH CARE EDUCATION/TRAINING PROGRAM
Payer: MEDICARE

## 2024-10-30 DIAGNOSIS — M54.16 LUMBAR RADICULOPATHY: ICD-10-CM

## 2024-10-30 PROCEDURE — 72148 MRI LUMBAR SPINE W/O DYE: CPT

## (undated) DEVICE — SUT PROL 6-0 24IN BV1 DA BLU --

## (undated) DEVICE — LUER-LOK 360°: Brand: CONNECTA, LUER-LOK

## (undated) DEVICE — SUT PROL 4-0 36IN RB1 DA BLU --

## (undated) DEVICE — STERILE POLYISOPRENE POWDER-FREE SURGICAL GLOVES WITH EMOLLIENT COATING: Brand: PROTEXIS

## (undated) DEVICE — Z INACTIVE USE 2384703 CLIP INT SM WIDE RED TI TRNSVRS GRV CHEVRON SHP W PRECIS

## (undated) DEVICE — INTENDED FOR TISSUE SEPARATION, AND OTHER PROCEDURES THAT REQUIRE A SHARP SURGICAL BLADE TO PUNCTURE OR CUT.: Brand: BARD-PARKER ® CARBON RIB-BACK BLADES

## (undated) DEVICE — SPONGE LAP 18X18IN STRL -- 5/PK

## (undated) DEVICE — WRAP SURG W1.31XL1.34M CARD FOR PT 165-172CM THERMOWRP

## (undated) DEVICE — SOLUTION IV 1000ML PH 7.4 INJ NRMSOL R

## (undated) DEVICE — DERMABOND SKIN ADH 0.7ML -- DERMABOND ADVANCED 12/BX

## (undated) DEVICE — PACK PROCEDURE SURG HRT CATH

## (undated) DEVICE — SOL IRR STRL H2O 1000ML BTL --

## (undated) DEVICE — BAG RED 3PLY 2MIL 30X40 IN

## (undated) DEVICE — CANNULA SUCT L8.5IN DIA20FR VENT CONN 3/8IN ART MTL CRV TIP

## (undated) DEVICE — NEEDLE HYPO 18GA L1.5IN PNK S STL HUB POLYPR SHLD REG BVL

## (undated) DEVICE — CATH DIAG-D 6F MULTI PIG 155 5 -- IMPULSE 16599-302

## (undated) DEVICE — 3M™ TEGADERM™ TRANSPARENT FILM DRESSING FRAME STYLE, 1626W, 4 IN X 4-3/4 IN (10 CM X 12 CM), 50/CT 4CT/CASE: Brand: 3M™ TEGADERM™

## (undated) DEVICE — INTENDED TO STANDARDIZE OR CAMERAS TO ALLOW FOR THE USE OF THE OR CAMERA COVER: Brand: ASPEN® O.R. CAMERA COVER

## (undated) DEVICE — SOL INJ SOD CL 0.9% 500ML BG --

## (undated) DEVICE — PLEDGET SUT SFT OVL 3 8X5 16IN

## (undated) DEVICE — STERNUM BLADE, OFFSET (31.7 X 0.64 X 6.3MM)

## (undated) DEVICE — 6 FOOT DISPOSABLE EXTENSION CABLE WITH SAFE CONNECT / SCREW-DOWN

## (undated) DEVICE — 40418 TRENDELENBURG ONE-STEP ARM PROTECTORS LARGE (1 PAIR): Brand: 40418 TRENDELENBURG ONE-STEP ARM PROTECTORS LARGE (1 PAIR)

## (undated) DEVICE — SYR 50ML LR LCK 1ML GRAD NSAF --

## (undated) DEVICE — SUT PROL 3-0 30IN SH1 DA BLU --

## (undated) DEVICE — Device

## (undated) DEVICE — PROCEDURE KIT FLUID MGMT CUST MAINFOLD STRL

## (undated) DEVICE — PRESSURE MONITORING SET: Brand: TRUWAVE

## (undated) DEVICE — DRAPE FLD WRM W44XL66IN C6L FOR INTRATEMP SYS THERMABASIN

## (undated) DEVICE — SUTURE S STL SZ 6 L18IN NONABSORBABLE SIL L48MM V-40 1/2 M649G

## (undated) DEVICE — BLADE OPHTH W1.5MM 15DEG ORNG HNDL SHRP SHRP DEL FOR CATRCT

## (undated) DEVICE — DESTINATION PERIPHERAL GUIDING SHEATH: Brand: DESTINATION

## (undated) DEVICE — REM POLYHESIVE ADULT PATIENT RETURN ELECTRODE: Brand: VALLEYLAB

## (undated) DEVICE — BANDAGE COMPR ELASTIC 5 YDX6 IN

## (undated) DEVICE — CATHETER IV 14GA L2IN POLYUR STR ORNG HUB SFTY RADPQ DISP

## (undated) DEVICE — DRSG BORDR MPLX HEEL 8.7X9.1IN --

## (undated) DEVICE — CLIP LIG M BLU TI HRT SHP WIRE HORZ 600 PER BX

## (undated) DEVICE — DRAIN,WOUND,ROUND,24FR,5/16",FULL-FLUTED: Brand: MEDLINE

## (undated) DEVICE — SUTURE N ABSRB MONOFILAMENT 4-0 SH1 36 IN BLU PROLENE D3986

## (undated) DEVICE — PINNACLE INTRODUCER SHEATH: Brand: PINNACLE

## (undated) DEVICE — DRESSING SIL W4XL5IN ANTIBACT GELLING FBR CYTOFORM

## (undated) DEVICE — COVER LT HNDL PLAS RIG 1 PER PK

## (undated) DEVICE — GOWN,SIRUS,NONRNF,SETINSLV,XL,20/CS: Brand: MEDLINE

## (undated) DEVICE — PREP SKN CHLRAPRP APL 26ML STR --

## (undated) DEVICE — SUTURE MCRYL SZ 3-0 L27IN ABSRB UD L24MM PS-1 3/8 CIR PRIM Y936H

## (undated) DEVICE — SUTURE VCRL SZ 0 L18IN ABSRB VLT L40MM CT 1/2 CIR J752D

## (undated) DEVICE — TIDISHIELD TRANSPORT, CONTAINMENT COVER FOR BACK TABLE 4'6" (1.37M) TO 8' (2.43M) IN LENGTH: Brand: TIDISHIELD

## (undated) DEVICE — SYR 3ML LL TIP 1/10ML GRAD --

## (undated) DEVICE — CANNULA PERF 15FR L12.5IN RG STPCOCK WIREWOUND BODY

## (undated) DEVICE — DRAPE PRB US TRNSDCR 6X96IN --

## (undated) DEVICE — TUBING, SUCTION, 1/4" X 12', STRAIGHT: Brand: MEDLINE

## (undated) DEVICE — STRAP,POSITIONING,KNEE/BODY,FOAM,4X60": Brand: MEDLINE

## (undated) DEVICE — SYS VSL HARV HEMOPRO2 VASOVIEW -- HARV SYS MINIMUM ORDER 5/EA

## (undated) DEVICE — TRANSFER PK BLD PROD 300ML --

## (undated) DEVICE — SUTURE PROL SZ 7-0 L24IN NONABSORBABLE BLU L8MM BV175-6 3/8 8735H

## (undated) DEVICE — SPONGE GZ W4XL4IN COT 12 PLY TYP VII WVN C FLD DSGN

## (undated) DEVICE — 72" ARTERIAL PRESSURE TUBING: Brand: ICU MEDICAL

## (undated) DEVICE — 1000ML,PRESSURE INFUSER W/STOPCOCK: Brand: MEDLINE

## (undated) DEVICE — TBG INSUFFLATION LUER LOCK: Brand: MEDLINE INDUSTRIES, INC.

## (undated) DEVICE — MEDI-TRACE CADENCE ADULT, DEFIBRILLATION ELECTRODE -RTS  (10 PR/PK) - PHILIPS: Brand: MEDI-TRACE CADENCE

## (undated) DEVICE — SOL IRR SOD CL 0.9% 1000ML BTL --

## (undated) DEVICE — BANDAGE COMPR M W6INXL10YD WHT BGE VELC E MTRX HK AND LOOP

## (undated) DEVICE — KIT BLWR MISTER 5P 15L W/ TBNG SET IRRIG MIST TO IMPROVE

## (undated) DEVICE — SYR 10ML LUER LOK 1/5ML GRAD --

## (undated) DEVICE — NEEDLE ANGIO 18GA L9CM NRML 1 WALL SMOOTH FINISH CLR HUB FOR

## (undated) DEVICE — INFECTION CONTROL KIT SYS

## (undated) DEVICE — SUT PROL 7-0 24IN BV1 DA BLU --

## (undated) DEVICE — SEALANT TISS 10 CC FIBRIN VISTASEAL

## (undated) DEVICE — TTL1LYR 16FR10ML 100%SIL TMPST TR: Brand: MEDLINE

## (undated) DEVICE — 450 ML BOTTLE OF 0.05% CHLORHEXIDINE GLUCONATE IN 99.95% STERILE WATER FOR IRRIGATION, USP AND APPLICATOR.: Brand: IRRISEPT ANTIMICROBIAL WOUND LAVAGE

## (undated) DEVICE — KIT,ANTI FOG,W/SPONGE & FLUID,SOFT PACK: Brand: MEDLINE

## (undated) DEVICE — PAD,ABDOMINAL,5"X9",ST,LF,25/BX: Brand: MEDLINE INDUSTRIES, INC.